# Patient Record
Sex: FEMALE | Race: BLACK OR AFRICAN AMERICAN | NOT HISPANIC OR LATINO | Employment: FULL TIME | URBAN - METROPOLITAN AREA
[De-identification: names, ages, dates, MRNs, and addresses within clinical notes are randomized per-mention and may not be internally consistent; named-entity substitution may affect disease eponyms.]

---

## 2017-06-02 ENCOUNTER — APPOINTMENT (OUTPATIENT)
Dept: RADIOLOGY | Facility: MEDICAL CENTER | Age: 47
DRG: 871 | End: 2017-06-02
Attending: EMERGENCY MEDICINE
Payer: COMMERCIAL

## 2017-06-02 ENCOUNTER — HOSPITAL ENCOUNTER (INPATIENT)
Facility: MEDICAL CENTER | Age: 47
LOS: 6 days | DRG: 871 | End: 2017-06-08
Attending: EMERGENCY MEDICINE | Admitting: INTERNAL MEDICINE
Payer: COMMERCIAL

## 2017-06-02 ENCOUNTER — RESOLUTE PROFESSIONAL BILLING HOSPITAL PROF FEE (OUTPATIENT)
Dept: HOSPITALIST | Facility: MEDICAL CENTER | Age: 47
End: 2017-06-02
Payer: COMMERCIAL

## 2017-06-02 DIAGNOSIS — J18.9 PNEUMONIA, COMMUNITY ACQUIRED: ICD-10-CM

## 2017-06-02 DIAGNOSIS — I50.31 ACUTE DIASTOLIC CHF (CONGESTIVE HEART FAILURE) (HCC): ICD-10-CM

## 2017-06-02 DIAGNOSIS — J96.01 ACUTE RESPIRATORY FAILURE WITH HYPOXIA (HCC): ICD-10-CM

## 2017-06-02 DIAGNOSIS — J81.0 ACUTE PULMONARY EDEMA (HCC): ICD-10-CM

## 2017-06-02 DIAGNOSIS — D86.9 SARCOIDOSIS: ICD-10-CM

## 2017-06-02 DIAGNOSIS — D50.0 IRON DEFICIENCY ANEMIA DUE TO CHRONIC BLOOD LOSS: ICD-10-CM

## 2017-06-02 DIAGNOSIS — J15.211: ICD-10-CM

## 2017-06-02 DIAGNOSIS — R09.02 HYPOXIA: ICD-10-CM

## 2017-06-02 PROBLEM — J96.00 ACUTE RESPIRATORY FAILURE (HCC): Status: ACTIVE | Noted: 2017-06-02

## 2017-06-02 LAB
ALBUMIN SERPL BCP-MCNC: 3.3 G/DL (ref 3.2–4.9)
ALBUMIN/GLOB SERPL: 0.9 G/DL
ALP SERPL-CCNC: 77 U/L (ref 30–99)
ALT SERPL-CCNC: 23 U/L (ref 2–50)
ANION GAP SERPL CALC-SCNC: 11 MMOL/L (ref 0–11.9)
ANISOCYTOSIS BLD QL SMEAR: ABNORMAL
APPEARANCE UR: CLEAR
AST SERPL-CCNC: 39 U/L (ref 12–45)
BASOPHILS # BLD AUTO: 0.3 % (ref 0–1.8)
BASOPHILS # BLD: 0.05 K/UL (ref 0–0.12)
BILIRUB SERPL-MCNC: 0.6 MG/DL (ref 0.1–1.5)
BILIRUB UR QL STRIP.AUTO: NEGATIVE
BNP SERPL-MCNC: 52 PG/ML (ref 0–100)
BUN SERPL-MCNC: <5 MG/DL (ref 8–22)
CALCIUM SERPL-MCNC: 8 MG/DL (ref 8.4–10.2)
CHLORIDE SERPL-SCNC: 100 MMOL/L (ref 96–112)
CO2 SERPL-SCNC: 23 MMOL/L (ref 20–33)
COLOR UR: YELLOW
COMMENT 1642: NORMAL
CREAT SERPL-MCNC: 0.89 MG/DL (ref 0.5–1.4)
DACRYOCYTES BLD QL SMEAR: NORMAL
EOSINOPHIL # BLD AUTO: 0.04 K/UL (ref 0–0.51)
EOSINOPHIL NFR BLD: 0.3 % (ref 0–6.9)
EPI CELLS #/AREA URNS HPF: NORMAL /HPF
ERYTHROCYTE [DISTWIDTH] IN BLOOD BY AUTOMATED COUNT: 51.8 FL (ref 35.9–50)
EST. AVERAGE GLUCOSE BLD GHB EST-MCNC: 128 MG/DL
FERRITIN SERPL-MCNC: 17 NG/ML (ref 10–291)
FLUAV H1 2009 PAND RNA SPEC QL NAA+PROBE: NOT DETECTED
FLUAV RNA SPEC QL NAA+PROBE: NEGATIVE
FLUAV+FLUBV AG SPEC QL IA: NORMAL
FLUBV RNA SPEC QL NAA+PROBE: NEGATIVE
FOLATE SERPL-MCNC: 5.2 NG/ML
GFR SERPL CREATININE-BSD FRML MDRD: >60 ML/MIN/1.73 M 2
GLOBULIN SER CALC-MCNC: 3.6 G/DL (ref 1.9–3.5)
GLUCOSE BLD-MCNC: 167 MG/DL (ref 65–99)
GLUCOSE BLD-MCNC: 219 MG/DL (ref 65–99)
GLUCOSE SERPL-MCNC: 141 MG/DL (ref 65–99)
GLUCOSE UR STRIP.AUTO-MCNC: NEGATIVE MG/DL
HBA1C MFR BLD: 6.1 % (ref 0–5.6)
HCT VFR BLD AUTO: 28.6 % (ref 37–47)
HEMOCCULT SP1 STL QL: NEGATIVE
HGB BLD-MCNC: 8.4 G/DL (ref 12–16)
HYPOCHROMIA BLD QL SMEAR: ABNORMAL
IMM GRANULOCYTES # BLD AUTO: 0.07 K/UL (ref 0–0.11)
IMM GRANULOCYTES NFR BLD AUTO: 0.4 % (ref 0–0.9)
IRON SATN MFR SERPL: 2 % (ref 15–55)
IRON SERPL-MCNC: 10 UG/DL (ref 40–170)
KETONES UR STRIP.AUTO-MCNC: NEGATIVE MG/DL
LACTATE BLD-SCNC: 1.79 MMOL/L (ref 0.5–2)
LACTATE BLD-SCNC: 2.47 MMOL/L (ref 0.5–2)
LACTATE BLD-SCNC: 3.05 MMOL/L (ref 0.5–2)
LACTATE BLD-SCNC: 3.28 MMOL/L (ref 0.5–2)
LACTATE BLD-SCNC: 3.68 MMOL/L (ref 0.5–2)
LEUKOCYTE ESTERASE UR QL STRIP.AUTO: NEGATIVE
LG PLATELETS BLD QL SMEAR: NORMAL
LYMPHOCYTES # BLD AUTO: 1.98 K/UL (ref 1–4.8)
LYMPHOCYTES NFR BLD: 12.7 % (ref 22–41)
MACROCYTES BLD QL SMEAR: ABNORMAL
MCH RBC QN AUTO: 20.5 PG (ref 27–33)
MCHC RBC AUTO-ENTMCNC: 29.4 G/DL (ref 33.6–35)
MCV RBC AUTO: 69.8 FL (ref 81.4–97.8)
MICRO URNS: ABNORMAL
MONOCYTES # BLD AUTO: 0.86 K/UL (ref 0–0.85)
MONOCYTES NFR BLD AUTO: 5.5 % (ref 0–13.4)
MUCOUS THREADS #/AREA URNS HPF: NORMAL /HPF
NEUTROPHILS # BLD AUTO: 12.58 K/UL (ref 2–7.15)
NEUTROPHILS NFR BLD: 80.8 % (ref 44–72)
NITRITE UR QL STRIP.AUTO: NEGATIVE
NRBC # BLD AUTO: 0.05 K/UL
NRBC BLD AUTO-RTO: 0.3 /100 WBC
PH UR STRIP.AUTO: 6 [PH]
PLATELET # BLD AUTO: 339 K/UL (ref 164–446)
PLATELET BLD QL SMEAR: NORMAL
PMV BLD AUTO: 10 FL (ref 9–12.9)
POIKILOCYTOSIS BLD QL SMEAR: NORMAL
POLYCHROMASIA BLD QL SMEAR: NORMAL
POTASSIUM SERPL-SCNC: 2.9 MMOL/L (ref 3.6–5.5)
PROT SERPL-MCNC: 6.9 G/DL (ref 6–8.2)
PROT UR QL STRIP: 30 MG/DL
RBC # BLD AUTO: 4.1 M/UL (ref 4.2–5.4)
RBC # URNS HPF: NORMAL /HPF
RBC BLD AUTO: PRESENT
RBC UR QL AUTO: NEGATIVE
SIGNIFICANT IND 70042: NORMAL
SITE SITE: NORMAL
SODIUM SERPL-SCNC: 134 MMOL/L (ref 135–145)
SOURCE SOURCE: NORMAL
SP GR UR STRIP.AUTO: 1.01
SPECIMEN DRAWN FROM PATIENT: ABNORMAL
SPECIMEN DRAWN FROM PATIENT: NORMAL
STOMATOCYTES BLD QL SMEAR: NORMAL
TIBC SERPL-MCNC: 508 UG/DL (ref 250–450)
TSH SERPL DL<=0.005 MIU/L-ACNC: 0.56 UIU/ML (ref 0.35–5.5)
UNIDENT CRYS URNS QL MICRO: NORMAL /HPF
VIT B12 SERPL-MCNC: 257 PG/ML (ref 211–911)
WBC # BLD AUTO: 15.6 K/UL (ref 4.8–10.8)
WBC #/AREA URNS HPF: NORMAL /HPF

## 2017-06-02 PROCEDURE — 82746 ASSAY OF FOLIC ACID SERUM: CPT

## 2017-06-02 PROCEDURE — 700102 HCHG RX REV CODE 250 W/ 637 OVERRIDE(OP): Performed by: INTERNAL MEDICINE

## 2017-06-02 PROCEDURE — 87502 INFLUENZA DNA AMP PROBE: CPT

## 2017-06-02 PROCEDURE — 36415 COLL VENOUS BLD VENIPUNCTURE: CPT

## 2017-06-02 PROCEDURE — 83036 HEMOGLOBIN GLYCOSYLATED A1C: CPT

## 2017-06-02 PROCEDURE — 99223 1ST HOSP IP/OBS HIGH 75: CPT | Performed by: INTERNAL MEDICINE

## 2017-06-02 PROCEDURE — A9270 NON-COVERED ITEM OR SERVICE: HCPCS | Performed by: INTERNAL MEDICINE

## 2017-06-02 PROCEDURE — 83605 ASSAY OF LACTIC ACID: CPT | Mod: 91

## 2017-06-02 PROCEDURE — 94760 N-INVAS EAR/PLS OXIMETRY 1: CPT

## 2017-06-02 PROCEDURE — 87040 BLOOD CULTURE FOR BACTERIA: CPT

## 2017-06-02 PROCEDURE — 700101 HCHG RX REV CODE 250: Performed by: INTERNAL MEDICINE

## 2017-06-02 PROCEDURE — 82728 ASSAY OF FERRITIN: CPT

## 2017-06-02 PROCEDURE — 87086 URINE CULTURE/COLONY COUNT: CPT

## 2017-06-02 PROCEDURE — 83550 IRON BINDING TEST: CPT

## 2017-06-02 PROCEDURE — 71010 DX-CHEST-PORTABLE (1 VIEW): CPT

## 2017-06-02 PROCEDURE — 83880 ASSAY OF NATRIURETIC PEPTIDE: CPT

## 2017-06-02 PROCEDURE — 700101 HCHG RX REV CODE 250: Performed by: EMERGENCY MEDICINE

## 2017-06-02 PROCEDURE — A9270 NON-COVERED ITEM OR SERVICE: HCPCS | Performed by: EMERGENCY MEDICINE

## 2017-06-02 PROCEDURE — 99285 EMERGENCY DEPT VISIT HI MDM: CPT

## 2017-06-02 PROCEDURE — 80053 COMPREHEN METABOLIC PANEL: CPT

## 2017-06-02 PROCEDURE — 96361 HYDRATE IV INFUSION ADD-ON: CPT

## 2017-06-02 PROCEDURE — 87070 CULTURE OTHR SPECIMN AEROBIC: CPT

## 2017-06-02 PROCEDURE — 87400 INFLUENZA A/B EACH AG IA: CPT

## 2017-06-02 PROCEDURE — 83540 ASSAY OF IRON: CPT

## 2017-06-02 PROCEDURE — 700111 HCHG RX REV CODE 636 W/ 250 OVERRIDE (IP): Performed by: EMERGENCY MEDICINE

## 2017-06-02 PROCEDURE — 82607 VITAMIN B-12: CPT

## 2017-06-02 PROCEDURE — 84443 ASSAY THYROID STIM HORMONE: CPT

## 2017-06-02 PROCEDURE — 82270 OCCULT BLOOD FECES: CPT

## 2017-06-02 PROCEDURE — 700102 HCHG RX REV CODE 250 W/ 637 OVERRIDE(OP): Performed by: EMERGENCY MEDICINE

## 2017-06-02 PROCEDURE — 96375 TX/PRO/DX INJ NEW DRUG ADDON: CPT

## 2017-06-02 PROCEDURE — 93005 ELECTROCARDIOGRAM TRACING: CPT | Performed by: EMERGENCY MEDICINE

## 2017-06-02 PROCEDURE — 304562 HCHG STAT O2 MASK/CANNULA

## 2017-06-02 PROCEDURE — 770020 HCHG ROOM/CARE - TELE (206)

## 2017-06-02 PROCEDURE — 85025 COMPLETE CBC W/AUTO DIFF WBC: CPT

## 2017-06-02 PROCEDURE — 96365 THER/PROPH/DIAG IV INF INIT: CPT

## 2017-06-02 PROCEDURE — 87503 INFLUENZA DNA AMP PROB ADDL: CPT

## 2017-06-02 PROCEDURE — 94640 AIRWAY INHALATION TREATMENT: CPT

## 2017-06-02 PROCEDURE — 81001 URINALYSIS AUTO W/SCOPE: CPT

## 2017-06-02 PROCEDURE — 700105 HCHG RX REV CODE 258: Performed by: NURSE PRACTITIONER

## 2017-06-02 PROCEDURE — 700105 HCHG RX REV CODE 258: Performed by: EMERGENCY MEDICINE

## 2017-06-02 PROCEDURE — 700111 HCHG RX REV CODE 636 W/ 250 OVERRIDE (IP): Performed by: INTERNAL MEDICINE

## 2017-06-02 PROCEDURE — 82962 GLUCOSE BLOOD TEST: CPT

## 2017-06-02 PROCEDURE — 96367 TX/PROPH/DG ADDL SEQ IV INF: CPT

## 2017-06-02 RX ORDER — L. ACIDOPHILUS/L.BULGARICUS 100MM CELL
1 GRANULES IN PACKET (EA) ORAL
Status: DISCONTINUED | OUTPATIENT
Start: 2017-06-02 | End: 2017-06-08 | Stop reason: HOSPADM

## 2017-06-02 RX ORDER — ONDANSETRON 4 MG/1
4 TABLET, ORALLY DISINTEGRATING ORAL EVERY 8 HOURS PRN
Qty: 10 TAB | Refills: 0 | Status: SHIPPED | OUTPATIENT
Start: 2017-06-02 | End: 2018-01-25

## 2017-06-02 RX ORDER — PROMETHAZINE HYDROCHLORIDE 25 MG/1
12.5-25 TABLET ORAL EVERY 4 HOURS PRN
Status: DISCONTINUED | OUTPATIENT
Start: 2017-06-02 | End: 2017-06-08 | Stop reason: HOSPADM

## 2017-06-02 RX ORDER — SODIUM CHLORIDE 9 MG/ML
30 INJECTION, SOLUTION INTRAVENOUS
Status: COMPLETED | OUTPATIENT
Start: 2017-06-02 | End: 2017-06-02

## 2017-06-02 RX ORDER — DEXTROSE MONOHYDRATE 25 G/50ML
25 INJECTION, SOLUTION INTRAVENOUS
Status: DISCONTINUED | OUTPATIENT
Start: 2017-06-02 | End: 2017-06-08 | Stop reason: HOSPADM

## 2017-06-02 RX ORDER — AZITHROMYCIN 250 MG/1
TABLET, FILM COATED ORAL
Qty: 6 TAB | Refills: 0 | Status: SHIPPED | OUTPATIENT
Start: 2017-06-02 | End: 2017-06-08

## 2017-06-02 RX ORDER — POTASSIUM CHLORIDE 20 MEQ/1
40 TABLET, EXTENDED RELEASE ORAL 2 TIMES DAILY
Status: DISCONTINUED | OUTPATIENT
Start: 2017-06-02 | End: 2017-06-03

## 2017-06-02 RX ORDER — POLYETHYLENE GLYCOL 3350 17 G/17G
1 POWDER, FOR SOLUTION ORAL
Status: DISCONTINUED | OUTPATIENT
Start: 2017-06-02 | End: 2017-06-08 | Stop reason: HOSPADM

## 2017-06-02 RX ORDER — ONDANSETRON 4 MG/1
4 TABLET, ORALLY DISINTEGRATING ORAL EVERY 4 HOURS PRN
Status: DISCONTINUED | OUTPATIENT
Start: 2017-06-02 | End: 2017-06-08 | Stop reason: HOSPADM

## 2017-06-02 RX ORDER — SODIUM CHLORIDE 9 MG/ML
1000 INJECTION, SOLUTION INTRAVENOUS ONCE
Status: COMPLETED | OUTPATIENT
Start: 2017-06-02 | End: 2017-06-02

## 2017-06-02 RX ORDER — BISACODYL 10 MG
10 SUPPOSITORY, RECTAL RECTAL
Status: DISCONTINUED | OUTPATIENT
Start: 2017-06-02 | End: 2017-06-08 | Stop reason: HOSPADM

## 2017-06-02 RX ORDER — POTASSIUM CHLORIDE 750 MG/1
40 TABLET, FILM COATED, EXTENDED RELEASE ORAL ONCE
Status: COMPLETED | OUTPATIENT
Start: 2017-06-02 | End: 2017-06-02

## 2017-06-02 RX ORDER — OXYCODONE HYDROCHLORIDE 5 MG/1
5 TABLET ORAL EVERY 4 HOURS PRN
Status: DISCONTINUED | OUTPATIENT
Start: 2017-06-02 | End: 2017-06-08 | Stop reason: HOSPADM

## 2017-06-02 RX ORDER — ONDANSETRON 2 MG/ML
4 INJECTION INTRAMUSCULAR; INTRAVENOUS EVERY 4 HOURS PRN
Status: DISCONTINUED | OUTPATIENT
Start: 2017-06-02 | End: 2017-06-08 | Stop reason: HOSPADM

## 2017-06-02 RX ORDER — PREDNISONE 50 MG/1
50 TABLET ORAL DAILY
Status: DISCONTINUED | OUTPATIENT
Start: 2017-06-02 | End: 2017-06-03

## 2017-06-02 RX ORDER — SODIUM CHLORIDE AND POTASSIUM CHLORIDE 300; 900 MG/100ML; MG/100ML
INJECTION, SOLUTION INTRAVENOUS CONTINUOUS
Status: DISCONTINUED | OUTPATIENT
Start: 2017-06-02 | End: 2017-06-03

## 2017-06-02 RX ORDER — PROMETHAZINE HYDROCHLORIDE 25 MG/1
12.5-25 SUPPOSITORY RECTAL EVERY 4 HOURS PRN
Status: DISCONTINUED | OUTPATIENT
Start: 2017-06-02 | End: 2017-06-08 | Stop reason: HOSPADM

## 2017-06-02 RX ORDER — ACETAMINOPHEN 325 MG/1
1000 TABLET ORAL ONCE
Status: COMPLETED | OUTPATIENT
Start: 2017-06-02 | End: 2017-06-02

## 2017-06-02 RX ORDER — AMOXICILLIN 250 MG
2 CAPSULE ORAL 2 TIMES DAILY
Status: DISCONTINUED | OUTPATIENT
Start: 2017-06-02 | End: 2017-06-08 | Stop reason: HOSPADM

## 2017-06-02 RX ORDER — ONDANSETRON 2 MG/ML
4 INJECTION INTRAMUSCULAR; INTRAVENOUS ONCE
Status: COMPLETED | OUTPATIENT
Start: 2017-06-02 | End: 2017-06-02

## 2017-06-02 RX ORDER — CEFTRIAXONE 2 G/1
2 INJECTION, POWDER, FOR SOLUTION INTRAMUSCULAR; INTRAVENOUS ONCE
Status: COMPLETED | OUTPATIENT
Start: 2017-06-02 | End: 2017-06-02

## 2017-06-02 RX ORDER — CEFUROXIME AXETIL 500 MG/1
500 TABLET ORAL 2 TIMES DAILY
Qty: 20 TAB | Refills: 0 | Status: SHIPPED | OUTPATIENT
Start: 2017-06-02 | End: 2017-06-08

## 2017-06-02 RX ORDER — HYDROCODONE BITARTRATE AND ACETAMINOPHEN 5; 325 MG/1; MG/1
1-2 TABLET ORAL EVERY 4 HOURS PRN
Qty: 20 TAB | Refills: 0 | Status: SHIPPED | OUTPATIENT
Start: 2017-06-02 | End: 2018-01-25

## 2017-06-02 RX ADMIN — HYDROMORPHONE HYDROCHLORIDE 1 MG: 1 INJECTION, SOLUTION INTRAMUSCULAR; INTRAVENOUS; SUBCUTANEOUS at 08:38

## 2017-06-02 RX ADMIN — ONDANSETRON 4 MG: 2 INJECTION INTRAMUSCULAR; INTRAVENOUS at 08:38

## 2017-06-02 RX ADMIN — Medication 400 MG: at 20:15

## 2017-06-02 RX ADMIN — ALBUTEROL SULFATE 2.5 MG: 2.5 SOLUTION RESPIRATORY (INHALATION) at 14:44

## 2017-06-02 RX ADMIN — INSULIN LISPRO 3 UNITS: 100 INJECTION, SOLUTION INTRAVENOUS; SUBCUTANEOUS at 20:24

## 2017-06-02 RX ADMIN — ALBUTEROL SULFATE 2.5 MG: 2.5 SOLUTION RESPIRATORY (INHALATION) at 08:27

## 2017-06-02 RX ADMIN — POTASSIUM CHLORIDE 40 MEQ: 1500 TABLET, EXTENDED RELEASE ORAL at 13:34

## 2017-06-02 RX ADMIN — SODIUM CHLORIDE 3261 ML: 9 INJECTION, SOLUTION INTRAVENOUS at 08:37

## 2017-06-02 RX ADMIN — AZITHROMYCIN MONOHYDRATE 500 MG: 500 INJECTION, POWDER, LYOPHILIZED, FOR SOLUTION INTRAVENOUS at 11:33

## 2017-06-02 RX ADMIN — INSULIN LISPRO 2 UNITS: 100 INJECTION, SOLUTION INTRAVENOUS; SUBCUTANEOUS at 16:59

## 2017-06-02 RX ADMIN — DOCUSATE SODIUM AND SENNOSIDES 2 TABLET: 8.6; 5 TABLET, FILM COATED ORAL at 13:43

## 2017-06-02 RX ADMIN — PREDNISONE 50 MG: 50 TABLET ORAL at 13:34

## 2017-06-02 RX ADMIN — ALBUTEROL SULFATE 2.5 MG: 2.5 SOLUTION RESPIRATORY (INHALATION) at 18:26

## 2017-06-02 RX ADMIN — LACTOBACILLUS ACIDOPHILUS / LACTOBACILLUS BULGARICUS 1 PACKET: 100 MILLION CFU STRENGTH GRANULES at 16:56

## 2017-06-02 RX ADMIN — OXYCODONE HYDROCHLORIDE 5 MG: 5 TABLET ORAL at 20:15

## 2017-06-02 RX ADMIN — POTASSIUM CHLORIDE 40 MEQ: 1500 TABLET, EXTENDED RELEASE ORAL at 20:17

## 2017-06-02 RX ADMIN — ACETAMINOPHEN 975 MG: 325 TABLET, FILM COATED ORAL at 08:55

## 2017-06-02 RX ADMIN — ONDANSETRON 4 MG: 2 INJECTION INTRAMUSCULAR; INTRAVENOUS at 13:35

## 2017-06-02 RX ADMIN — SODIUM CHLORIDE AND POTASSIUM CHLORIDE: 9; 2.98 INJECTION, SOLUTION INTRAVENOUS at 13:33

## 2017-06-02 RX ADMIN — ENOXAPARIN SODIUM 40 MG: 100 INJECTION SUBCUTANEOUS at 13:30

## 2017-06-02 RX ADMIN — POTASSIUM CHLORIDE 40 MEQ: 750 TABLET, FILM COATED, EXTENDED RELEASE ORAL at 10:31

## 2017-06-02 RX ADMIN — DOCUSATE SODIUM AND SENNOSIDES 2 TABLET: 8.6; 5 TABLET, FILM COATED ORAL at 20:14

## 2017-06-02 RX ADMIN — SODIUM CHLORIDE 1000 ML: 9 INJECTION, SOLUTION INTRAVENOUS at 23:04

## 2017-06-02 RX ADMIN — Medication 400 MG: at 13:34

## 2017-06-02 RX ADMIN — OXYCODONE HYDROCHLORIDE 5 MG: 5 TABLET ORAL at 16:07

## 2017-06-02 RX ADMIN — CEFTRIAXONE 2 G: 2 INJECTION, POWDER, FOR SOLUTION INTRAMUSCULAR; INTRAVENOUS at 10:50

## 2017-06-02 ASSESSMENT — LIFESTYLE VARIABLES
ALCOHOL_USE: NO
EVER_SMOKED: YES
PACK_YEARS: 2
EVER_SMOKED: YES

## 2017-06-02 ASSESSMENT — PAIN SCALES - GENERAL
PAINLEVEL_OUTOF10: 0
PAINLEVEL_OUTOF10: 6
PAINLEVEL_OUTOF10: 8
PAINLEVEL_OUTOF10: 6

## 2017-06-02 NOTE — PROGRESS NOTES
Pt resting, eyes closed, appears comfortable. Respirations even although still somewhat shallow/tachypneic.

## 2017-06-02 NOTE — IP AVS SNAPSHOT
" Home Care Instructions                                                                                                                  Name:Joann Deshpande  Medical Record Number:[id number]  CSN: [id number]    YOB: 1970   Age: 46 y.o.  Sex: female  HT:1.702 m (5' 7\") WT: 108.7 kg (239 lb 10.2 oz)          Admit Date: 6/2/2017     Discharge Date:   Today's Date: 6/8/2017  Attending Doctor:  Kim Zuñiga D.O.                  Allergies:  Review of patient's allergies indicates no known allergies.            Discharge Instructions       Pneumonia, Adult  Drink plenty of fluids. Take ibuprofen for pain and fever, hydrocodone for fever and cough and antibiotics as prescribed. Use Zofran if needed for nausea and vomiting. Return for ill appearance, worsening shortness of breath, uncontrolled vomiting, severe dizziness or failure to improve in 36-48 hours.     Pneumonia is an infection of the lungs.   CAUSES  Pneumonia may be caused by bacteria or a virus. Usually, the infection is caused by breathing in droplets from an infected person's cough or sneeze.   SYMPTOMS   Symptoms of pneumonia include:  · Cough.  · Fever.  · Chest pain.  · Rapid breathing.  · Shortness of breath.  · Shaking chills.  · Mucus production.  DIAGNOSIS   If you have the common symptoms of pneumonia, often your health care provider will confirm the diagnosis with a chest X-ray. The X-ray will show an abnormality in the lung if you have pneumonia. Other tests may be done on your blood, urine, or mucus (sputum) to find the specific cause of your pneumonia. A blood gas test or pulse oximetry test may be needed to check how well your lungs are working.  TREATMENT   Your treatment will depend on whether your pneumonia is caused by bacteria or a virus.   1. Bacterial pneumonia is treated with antibiotic medicine.  2. Pneumonia that is caused by the influenza virus may be treated with an antiviral medicine.  3. Pneumonia that is caused by a " virus other than influenza will not respond to antibiotic medicine. This type of pneumonia will have to run its course.   HOME CARE INSTRUCTIONS   1. Cough suppressants may be used if you are losing too much rest from coughing at night. However, you should try to avoid taking cough suppresants. This is because coughing helps to remove mucus from your lungs.  2. Sleep in a semi-upright position at night. Try sleeping in a reclining chair, or place a few pillows under your head.  3. Try using a cold steam vaporizer or humidifier in your home or bedroom. This may help loosen your mucus.  4. If you were prescribed an antibiotic medicine, finish all of it even if you start to feel better.  5. If you were prescribed an expectorant, take it as directed by your health care provider. This medicine loosens the mucus so you can cough it up.  6. Take medicines only as directed by your health care provider.  7. Do not smoke. If you are a smoker and continue to smoke, your cough may last several weeks after your pneumonia has cleared.  8. Get rest when you feel tired, or as needed.  PREVENTION  A pneumococcal shot (vaccine) is available to prevent a common bacterial cause of pneumonia. This is usually suggested for:  1. People over 65 years old.  2. People on chemotherapy.  3. People with chronic lung problems, such as bronchitis or emphysema.  4. People with immune system problems.  If you are over 65 years old or have a high risk condition, you may receive the pneumococcal vaccine if you have not received it before. In some countries, a routine influenza vaccine is also recommended. This vaccine can help prevent some cases of pneumonia. You may be offered the influenza vaccine as part of your care.  If you are a smoker, it is time to quit in order to prevent pneumonia in the future. You may receive instructions on how to stop smoking. Your health care provider can provide medicines and counseling to help you quit.  SEEK MEDICAL  CARE IF:  1. You have a fever.  2. You cannot control your cough with suppressants at night, and you keep losing sleep.  SEEK IMMEDIATE MEDICAL CARE IF:   · You have worsening shortness of breath.  · You have increased chest pain.  · Your sickness becomes worse, especially if you are an older adult or have a weakened immune system.  · You cough up blood.  · You have pain that is getting worse or is not controlled with medicines.  · Your symptoms are getting worse rather than better.     This information is not intended to replace advice given to you by your health care provider. Make sure you discuss any questions you have with your health care provider.     Document Released: 12/18/2006 Document Revised: 01/08/2016 Document Reviewed: 04/13/2016  [employer] Interactive Patient Education ©2016 [employer]    Discharge Instructions    Discharged to home by car with relative. Discharged via wheelchair, hospital escort: Yes.  Special equipment needed: Not Applicable    Be sure to schedule a follow-up appointment with your primary care doctor or any specialists as instructed.     Discharge Plan:   Influenza Vaccine Indication: Indicated: Not available from distributor/ (past flu season)    I understand that a diet low in cholesterol, fat, and sodium is recommended for good health. Unless I have been given specific instructions below for another diet, I accept this instruction as my diet prescription.   Other diet: n/a    Special Instructions: None    · Is patient discharged on Warfarin / Coumadin?   No     · Is patient Post Blood Transfusion?  No    Depression / Suicide Risk    As you are discharged from this [facility] facility, it is important to learn how to keep safe from harming yourself.    Recognize the warning signs:  · Abrupt changes in personality, positive or negative- including increase in energy   · Giving away possessions  · Change in eating patterns- significant weight changes-  positive or  negative  · Change in sleeping patterns- unable to sleep or sleeping all the time   · Unwillingness or inability to communicate  · Depression  · Unusual sadness, discouragement and loneliness  · Talk of wanting to die  · Neglect of personal appearance   · Rebelliousness- reckless behavior  · Withdrawal from people/activities they love  · Confusion- inability to concentrate     If you or a loved one observes any of these behaviors or has concerns about self-harm, here's what you can do:  · Talk about it- your feelings and reasons for harming yourself  · Remove any means that you might use to hurt yourself (examples: pills, rope, extension cords, firearm)  · Get professional help from the community (Mental Health, Substance Abuse, psychological counseling)  · Do not be alone:Call your Safe Contact- someone whom you trust who will be there for you.  · Call your local CRISIS HOTLINE 874-8812 or 047-527-8175  · Call your local Children's Mobile Crisis Response Team [place] (684) 314-8158 or www.Relayr  · Call the toll free National Suicide Prevention Hotlines   · National Suicide Prevention Lifeline 584-782-XHRB (4451)  · National Hope Line Network 800-SUICIDE (253-2280)        Follow-up Information     1. Follow up with Edis Rivas M.D.. Schedule an appointment as soon as possible for a visit in 3 days.    Specialty:  Cardiology    Why:  As needed    Contact information    [address]  [address]  [facility] [address] [phone]           Discharge Medication Instructions:    Below are the medications your physician expects you to take upon discharge:    Review all your home medications and newly ordered medications with your doctor and/or pharmacist. Follow medication instructions as directed by your doctor and/or pharmacist.    Please keep your medication list with you and share with your physician.               Medication List      START taking these medications        Instructions     Morning Afternoon Evening Bedtime    albuterol 108 (90 BASE) MCG/ACT Aers inhalation aerosol        Inhale 2 Puffs by mouth every 6 hours as needed for Shortness of Breath.   Dose:  2 Puff                        ferrous sulfate 325 (65 FE) MG tablet   Last time this was given:  325 mg on 6/8/2017  4:56 PM        Take 1 Tab by mouth every day.   Dose:  325 mg                        fluticasone-salmeterol 250-50 MCG/DOSE Aepb   Commonly known as:  ADVAIR        Inhale 1 Puff by mouth every 12 hours.   Dose:  1 Puff                        furosemide 40 MG Tabs   Last time this was given:  40 mg on 6/8/2017  8:49 AM   Commonly known as:  LASIX        Take 1 Tab by mouth every day.   Dose:  40 mg                        hydrocodone-acetaminophen 5-325 MG Tabs per tablet   Commonly known as:  NORCO        Take 1-2 Tabs by mouth every four hours as needed (mild pain).   Dose:  1-2 Tab                        levofloxacin 500 MG tablet   Commonly known as:  LEVAQUIN        Take 1 Tab by mouth every day for 7 days.   Dose:  500 mg                        ondansetron 4 MG Tbdp   Commonly known as:  ZOFRAN ODT        Take 1 Tab by mouth every 8 hours as needed for Nausea/Vomiting.   Dose:  4 mg                        SUMAtriptan 25 MG Tabs tablet   Last time this was given:  25 mg on 6/8/2017  4:56 PM   Commonly known as:  IMITREX        Take 1-4 Tabs by mouth Once PRN for Migraine for up to 1 dose.   Dose:   mg                             Where to Get Your Medications      These medications were sent to [id number]  [place] - [address]  [address] [place] [place] [address]     Phone:  [phone]    - albuterol 108 (90 BASE) MCG/ACT Aers inhalation aerosol  - ferrous sulfate 325 (65 FE) MG tablet  - fluticasone-salmeterol 250-50 MCG/DOSE Aepb  - furosemide 40 MG Tabs  - levofloxacin 500 MG tablet  - SUMAtriptan 25 MG Tabs tablet      You can get these medications from any pharmacy     Bring a  paper prescription for each of these medications    - hydrocodone-acetaminophen 5-325 MG Tabs per tablet  - ondansetron 4 MG Tbdp            Orders for after discharge     .    Complete by:  As directed        DME O2 New Set Up    Complete by:  As directed        DME Pulse Oximeter    Complete by:  As directed    [id number]       DME Pulse Oximeter    Complete by:  As directed    [id number]             Instructions           Diet / Nutrition:    Follow any diet instructions given to you by your doctor or the dietician, including how much salt (sodium) you are allowed each day.    If you are overweight, talk to your doctor about a weight reduction plan.    Activity:    Remain physically active following your doctor's instructions about exercise and activity.    Rest often.     Any time you become even a little tired or short of breath, SIT DOWN and rest.    Worsening Symptoms:    Report any of the following signs and symptoms to the doctor's office immediately:    *Pain of jaw, arm, or neck  *Chest pain not relieved by medication                               *Dizziness or loss of consciousness  *Difficulty breathing even when at rest   *More tired than usual                                       *Bleeding drainage or swelling of surgical site  *Swelling of feet, ankles, legs or stomach                 *Fever (>100ºF)  *Pink or blood tinged sputum  *Weight gain (3lbs/day or 5lbs /week)           *Shock from internal defibrillator (if applicable)  *Palpitations or irregular heartbeats                *Cool and/or numb extremities    Stroke Awareness    Common Risk Factors for Stroke include:    Age  Atrial Fibrillation  Carotid Artery Stenosis  Diabetes Mellitus  Excessive alcohol consumption  High blood pressure  Overweight   Physical inactivity  Smoking    Warning signs and symptoms of a stroke include:    *Sudden numbness or weakness of the face, arm or leg (especially on one side of the body).  *Sudden confusion,  trouble speaking or understanding.  *Sudden trouble seeing in one or both eyes.  *Sudden trouble walking, dizziness, loss of balance or coordination.Sudden severe headache with no known cause.    It is very important to get treatment quickly when a stroke occurs. If you experience any of the above warning signs, call 911 immediately.                   Disclaimer         Quit Smoking / Tobacco Use:    I understand the use of any tobacco products increases my chance of suffering from future heart disease or stroke and could cause other illnesses which may shorten my life. Quitting the use of tobacco products is the single most important thing I can do to improve my health. For further information on smoking / tobacco cessation call a Toll Free Quit Line at 1-779.782.6601 (*National Cancer [place]) or 1-222.758.3519 (American Lung Association) or you can access the web based program at www.lungAdonit.org.    [place] Tobacco Users Help Line:  (861) 656-5276       Toll Free: 1-743.212.9236  Quit Tobacco Program [facility] (695)502-2589    Crisis Hotline:    [place] Crisis Hotline:  8-822-EZVYFSK or 1-947.812.5826    [place] Crisis Hotline:    1-724.311.3521 or 918-011-7021    Discharge Survey:   Thank you for choosing [facility]. We hope we did everything we could to make your hospital stay a pleasant one. You may be receiving a phone survey and we would appreciate your time and participation in answering the questions. Your input is very valuable to us in our efforts to improve our service to our patients and their families.        My signature on this form indicates that:    1. I have reviewed and understand the above information.  2. My questions regarding this information have been answered to my satisfaction.  3. I have formulated a plan with my discharge nurse to obtain my prescribed medications for home.                  Disclaimer         __________________________________                      __________       ________                       Patient Signature                                                 Date                    Time

## 2017-06-02 NOTE — CARE PLAN
Problem: Oxygenation:  Goal: Maintain adequate oxygenation dependent on patient condition  Intervention: Manage oxygen therapy by monitoring pulse oximetry and/or ABG values  Will continue to wean. Now on 3lpm oxygenating well.      Problem: Bronchoconstriction:  Goal: Improve in air movement and diminished wheezing  Outcome: PROGRESSING AS EXPECTED

## 2017-06-02 NOTE — PROGRESS NOTES
Received report from ED. Pt arrived to unit via [facility], [facility] care. This pt is AOx4, ambulatory with SBA, c/o nausea, medicated per MAR, voiding, declines pain. Patient and RN discussed plan of care including IV fluids and abx: questions answered. Labs noted, assessment complete, patient tolerating cardiac diet. Call light in place, fall precautions in place, patient educated on importance of calling for assistance. No additional needs at this time. VSS

## 2017-06-02 NOTE — PROGRESS NOTES
Tele Note    Rhythm Sinus rhythm, sinus tach  Rate 100's   TN 0.20  QRS 0.08  QT 0.34  Ectopy rare PVC

## 2017-06-02 NOTE — FLOWSHEET NOTE
06/02/17 0827   Events/Summary/Plan   Events/Summary/Plan Tx given in ER   Interdisciplinary Plan of Care-Goals (Indications)   Obstructive Ventilatory Defect or Pulmonary Disease without Obvious Obstruction Improved Peak Flow Pre / Post Bronchodilator with 15% Improvement   Interdisciplinary Plan of Care-Outcomes    Bronchodilator Outcome Improved Vital Signs and Measures of Gas Exchange   Education   Education Yes - Pt. / Family has been Instructed in use of Respiratory Medications and Adverse Reactions   RT Assessment of Delivered Medications   Evaluation of Medication Delivery Daily Yes-- Pt /Family has been Instructed in use of Respiratory Medications and Adverse Reactions   SVN Group   #SVN Performed Yes   Given By: Mouthpiece   Date SVN Last Changed 06/02/17   Date SVN Next Change Due (Q 7 Days) 06/09/17   Respiratory WDL   Respiratory (WDL) X   Chest Exam   Work Of Breathing / Effort Mild   Respiration 20   Pulse (!) 110   Breath Sounds   Pre/Post Intervention Post Intervention Assessment   RUL Breath Sounds Clear   RML Breath Sounds Clear;Diminished   RLL Breath Sounds Diminished   TENZIN Breath Sounds Clear   LLL Breath Sounds Diminished   Secretions   Cough Non Productive   How Sputum Obtained Spontaneous   Oximetry   Continuous Oximetry Yes   Oxygen   Home O2 Use Prior To Admission? No   Pulse Oximetry 92 %   O2 (LPM) 0   O2 (FiO2) 21   O2 Daily Delivery Respiratory  Room Air with O2 Available

## 2017-06-02 NOTE — IP AVS SNAPSHOT
6/8/2017    Joann Deshpande  [address]  [place] [address]    Dear Joann:    [facility] wants to ensure your discharge home is safe and you or your loved ones have had all of your questions answered regarding your care after you leave the hospital.    Below is a list of resources and contact information should you have any questions regarding your hospital stay, follow-up instructions, or active medical symptoms.    Questions or Concerns Regarding… Contact   Medical Questions Related to Your Discharge  (7 days a week, 8am-5pm) Contact a Nurse Care Coordinator   [phone]   Medical Questions Not Related to Your Discharge  (24 hours a day / 7 days a week)  Contact the Nurse Health Line   [phone]    Medications or Discharge Instructions Refer to your discharge packet   or contact your [facility] Primary Care Provider   [phone]   Follow-up Appointment(s) Schedule your appointment via [employer]   or contact Scheduling [phone]   Billing Review your statement via [employer]  or contact Billing [phone]   Medical Records Review your records via [employer]   or contact Medical Records [phone]     You may receive a telephone call within two days of discharge. This call is to make certain you understand your discharge instructions and have the opportunity to have any questions answered. You can also easily access your medical information, test results and upcoming appointments via the [employer] free online health management tool. You can learn more and sign up at [employer]. For assistance setting up your [employer] account, please call [phone].    Once again, we want to ensure your discharge home is safe and that you have a clear understanding of any next steps in your care. If you have any questions or concerns, please do not hesitate to contact us, we are here for you. Thank you for choosing [facility] for your healthcare needs.    Sincerely,    Your [facility] Healthcare Team

## 2017-06-02 NOTE — ED NOTES
0826:  PIV started in [facility], blood and BC x 1 drawn and sent to lab  0837:  IVF infusing as ordered  0838:  Medicated for pain and nausea as ordered  0842:  Second PIV started in [facility] by ER Tech.  Blood and BC x 2 drawn and sent to lab  Flu culture collected and sent to lab  0855:  Medicated w/ Tylenol for fever as ordered  0938:  Pt resting quietly at this time.  IVFs infusing

## 2017-06-02 NOTE — ED PROVIDER NOTES
"ED Provider Note    CHIEF COMPLAINT  Chief Complaint   Patient presents with   • Fever     x 1 week   • Cough     x 1 week   • Body Aches   • Chest Wall Pain       HPI  Joann Deshpande is a 46 y.o. female who presents for 8 days of fever with body aches, joint pain, headache, rhinorrhea, sore throat, cough both dry and productive green sputum. She's had nausea but no vomiting and 3 episodes of diarrhea today. She does report shortness of breath and pleuritic chest pain. No ill contacts. History of sarcoid on prednisone 5 mg a day. No diabetes. No influenza vaccine.    REVIEW OF SYSTEMS  Pertinent positives include: Fever, body aches, cough, shortness of breath, chest pain.  Pertinent negatives include: Asthma, COPD, vomiting, constipation, abdominal pain, rash.  10+ systems reviewed and negative.      PAST MEDICAL HISTORY  Past Medical History   Diagnosis Date   • Sarcoidosis (CMS-HCC)    • Anemia        SOCIAL HISTORY  Social History   Substance Use Topics   • Smoking status: Former Smoker   • Smokeless tobacco: None   • Alcohol Use: Yes     History   Drug Use No       SURGICAL HISTORY  History reviewed. No pertinent past surgical history.    CURRENT MEDICATIONS  Home Medications     **Home medications have not yet been reviewed for this encounter**          ALLERGIES  No Known Allergies    PHYSICAL EXAM  VITAL SIGNS: /77 mmHg  Pulse 110  Temp(Src) 39.3 °C (102.8 °F)  Resp 20  Ht 1.702 m (5' 7\")  Wt 108.7 kg (239 lb 10.2 oz)  BMI 37.52 kg/m2  SpO2 92%  Reviewed and tachycardic, elevated blood pressure, febrile, tachypneic, no hypoxia on room air  Constitutional: Well developed, Well nourished, well appearing, mildly overweight, speaking full sentences.  HENT: Normocephalic, atraumatic, bilateral external ears normal, oropharynx moist, No exudates or erythema.   Eyes: PERRLA, conjunctiva pink, no scleral icterus.   Cardiovascular: Regular, tachycardic without murmur, rub, gallop.  Respiratory: Mild " rhonchi, no definite wheezes or rales. No cough.  Gastrointestinal: Soft, moderately overweight, nontender, nondistended.  Skin: No erythema, no rash.   Genitourinary:  No costovertebral angle tenderness.   Neurologic: Alert & oriented x 3, cranial nerves 2-12 intact by passive exam.  No focal deficit noted.  Psychiatric: Affect normal, Judgment normal, Mood normal.     DIFFERENTIAL DIAGNOSIS:  Pneumonia, influenza, sepsis, pyelonephritis.    EKG  EKG Interpretation    Interpreted by me    Rhythm: normal sinus   Rate: Tachycardic at 117  Axis: normal  Ectopy: none  Conduction: normal  ST Segments: Nonspecific anterior lateral ST depression  T Waves: no acute change  Q Waves: none  No comparison  Clinical Impression: Sinus tachycardia with nonspecific anterior lateral ST change.    RADIOLOGY/PROCEDURES  DX-CHEST-PORTABLE (1 VIEW)   Final Result      1.  Cardiomegaly.      2.  Area of consolidation seen within the left mid upper lung likely representing pneumonia.          LABORATORY:  Results for orders placed or performed during the hospital encounter of 06/02/17   LACTIC ACID   Result Value Ref Range    Lactic Acid 3.68 (H) 0.50 - 2.00 mmol/L    Specimen Venous    LACTIC ACID   Result Value Ref Range    Lactic Acid 1.79 0.50 - 2.00 mmol/L    Specimen Venous    CBC WITH DIFFERENTIAL   Result Value Ref Range    WBC 15.6 (H) 4.8 - 10.8 K/uL    RBC 4.10 (L) 4.20 - 5.40 M/uL    Hemoglobin 8.4 (L) 12.0 - 16.0 g/dL    Hematocrit 28.6 (L) 37.0 - 47.0 %    MCV 69.8 (L) 81.4 - 97.8 fL    MCH 20.5 (L) 27.0 - 33.0 pg    MCHC 29.4 (L) 33.6 - 35.0 g/dL    RDW 51.8 (H) 35.9 - 50.0 fL    Platelet Count 339 164 - 446 K/uL    MPV 10.0 9.0 - 12.9 fL    Neutrophils-Polys 80.80 (H) 44.00 - 72.00 %    Lymphocytes 12.70 (L) 22.00 - 41.00 %    Monocytes 5.50 0.00 - 13.40 %    Eosinophils 0.30 0.00 - 6.90 %    Basophils 0.30 0.00 - 1.80 %    Immature Granulocytes 0.40 0.00 - 0.90 %    Nucleated RBC 0.30 /100 WBC    Neutrophils (Absolute)  12.58 (H) 2.00 - 7.15 K/uL    Lymphs (Absolute) 1.98 1.00 - 4.80 K/uL    Monos (Absolute) 0.86 (H) 0.00 - 0.85 K/uL    Eos (Absolute) 0.04 0.00 - 0.51 K/uL    Baso (Absolute) 0.05 0.00 - 0.12 K/uL    Immature Granulocytes (abs) 0.07 0.00 - 0.11 K/uL    NRBC (Absolute) 0.05 K/uL    Hypochromia 2+     Anisocytosis 1+     Macrocytosis 1+    COMP METABOLIC PANEL   Result Value Ref Range    Sodium 134 (L) 135 - 145 mmol/L    Potassium 2.9 (L) 3.6 - 5.5 mmol/L    Chloride 100 96 - 112 mmol/L    Co2 23 20 - 33 mmol/L    Anion Gap 11.0 0.0 - 11.9    Glucose 141 (H) 65 - 99 mg/dL    Bun <5 (L) 8 - 22 mg/dL    Creatinine 0.89 0.50 - 1.40 mg/dL    Calcium 8.0 (L) 8.4 - 10.2 mg/dL    AST(SGOT) 39 12 - 45 U/L    ALT(SGPT) 23 2 - 50 U/L    Alkaline Phosphatase 77 30 - 99 U/L    Total Bilirubin 0.6 0.1 - 1.5 mg/dL    Albumin 3.3 3.2 - 4.9 g/dL    Total Protein 6.9 6.0 - 8.2 g/dL    Globulin 3.6 (H) 1.9 - 3.5 g/dL    A-G Ratio 0.9 g/dL   URINALYSIS   Result Value Ref Range    Micro Urine Req Microscopic     Color Yellow     Character Clear     Specific Gravity 1.015 <1.035    Ph 6.0 5.0-8.0    Glucose Negative Negative mg/dL    Ketones Negative Negative mg/dL    Protein 30 (A) Negative mg/dL    Bilirubin Negative Negative    Nitrite Negative Negative    Leukocyte Esterase Negative Negative    Occult Blood Negative Negative   INFLUENZA RAPID   Result Value Ref Range    Significant Indicator NEG     Source RESP     Site RESPIRATORY     Rapid Influenza A-B       Negative for Influenza A and Influenza B antigens.  Infection due to influenza A or B cannot be ruled out  since the antigen present in the specimen may be below the  detection limit of the test. Culture confirmation of  negative samples is recommended.     URINE MICROSCOPIC (W/UA)   Result Value Ref Range    WBC Rare /hpf    RBC 0-2 /hpf    Epithelial Cells Few Few /hpf    Mucous Threads Rare /hpf    Urine Crystals Few Amorphous /hpf       INTERVENTIONS:  Medications    azithromycin (ZITHROMAX) 500 mg in D5W 250 mL IVPB (500 mg Intravenous New Bag 6/2/17 1133)   NS (BOLUS) infusion 3,261 mL (3,261 mL Intravenous Given 6/2/17 0837)   HYDROmorphone (DILAUDID) injection 1 mg (1 mg Intravenous Given 6/2/17 0838)   ondansetron (ZOFRAN) syringe/vial injection 4 mg (4 mg Intravenous Given 6/2/17 0838)   albuterol (PROVENTIL) 2.5mg/0.5ml nebulizer solution 2.5 mg (2.5 mg Nebulization Given 6/2/17 0827)   acetaminophen (TYLENOL) tablet 975 mg (975 mg Oral Given 6/2/17 0855)   cefTRIAXone (ROCEPHIN) injection 2 g (2 g Intravenous Given 6/2/17 1050)   potassium chloride ER (KLOR-CON) tablet 40 mEq (40 mEq Oral Given 6/2/17 1031)     Response: Patient hypoxic to 81% on room air after bronchodilators.    COURSE & MEDICAL DECISION MAKING  Ill-appearing patient presents with left upper lobe pneumonia with hypoxia. There may be some component of COPD. She appears to have early sepsis. There is no severe sepsis or septic shock. There is no UTI. Case discussed with Dr. Herbert hospitalist to admit the patient for oxygen and IV fluids IV antibiotics.    PLAN:  As above.    CONDITION: Fair.    FINAL IMPRESSION  1. Pneumonia of left upper lobe due to methicillin susceptible Staphylococcus aureus (MSSA) (CMS-HCC)    2. Iron deficiency anemia due to chronic blood loss    3. Hypoxia    4.      Sepsis      Electronically signed by: Jamal Mac, 6/2/2017 8:51 AM

## 2017-06-02 NOTE — ED NOTES
Med Rec completed per patient  Allergies reviewed  No ORAL antibiotics in last 30 days    Patient stated she takes no medications

## 2017-06-02 NOTE — FLOWSHEET NOTE
06/02/17 1425   Interdisciplinary Plan of Care-Goals (Indications)   Obstructive Ventilatory Defect or Pulmonary Disease without Obvious Obstruction History / Diagnosis   Hyperinflation Protocol Indications Restrictive Lung Disorder / Consolidation   Interdisciplinary Plan of Care-Outcomes    Bronchodilator Outcome Patient at Stable Baseline   Hyperinflation Protocol Goals/Outcome Increased Vital Capacity or Return to Pre-operative Values   RT Assessment of Delivered Medications   Evaluation of Medication Delivery Daily Yes-- Pt /Family has been Instructed in use of Respiratory Medications and Adverse Reactions   SVN Group   #SVN Performed Yes   Given By: Mouthpiece   Date SVN Last Changed 06/02/17   Date SVN Next Change Due (Q 7 Days) 06/09/17   Incentive Spirometry Group   Incentive Spirometry Instruction Yes   Incentive Spirometer Volume 1000 mL   Incentive Spirometer Date Last Changed 06/02/17   Incentive Spirometer Next Change Date (Q 30 Days) 07/02/17   Respiratory WDL   Respiratory (WDL) X   Chest Exam   Respiration (!) 24   Pulse 88   Breath Sounds   Pre/Post Intervention Pre Intervention Assessment   RUL Breath Sounds Expiratory Wheezes   RML Breath Sounds Diminished;Expiratory Wheezes   RLL Breath Sounds Diminished   TENZIN Breath Sounds Expiratory Wheezes   LLL Breath Sounds Diminished;Expiratory Wheezes   Secretions   Cough Moist;Non Productive   Oximetry   #Pulse Oximetry (Single Determination) Yes   Oxygen   Pulse Oximetry 98 %   O2 (LPM) 4   O2 Daily Delivery Respiratory  Nasal Cannula

## 2017-06-02 NOTE — IP AVS SNAPSHOT
ClubKviar Access Code: [email or web address]  Expires: 7/8/2017  5:04 PM    ClubKviar  A secure, online tool to manage your health information     [employer]s ClubKviar® is a secure, online tool that connects you to your personalized health information from the privacy of your home -- day or night - making it very easy for you to manage your healthcare. Once the activation process is completed, you can even access your medical information using the ClubKviar ruma, which is available for free in the Apple Ruma store or Google Play store.     ClubKviar provides the following levels of access (as shown below):   My Chart Features   [facility] Primary Care Doctor [facility]  Specialists [facility]  Urgent  Care Non-[facility]  Primary Care  Doctor   Email your healthcare team securely and privately 24/7 X X X X   Manage appointments: schedule your next appointment; view details of past/upcoming appointments X      Request prescription refills. X      View recent personal medical records, including lab and immunizations X X X X   View health record, including health history, allergies, medications X X X X   Read reports about your outpatient visits, procedures, consult and ER notes X X X X   See your discharge summary, which is a recap of your hospital and/or ER visit that includes your diagnosis, lab results, and care plan. X X       How to register for ClubKviar:  1. Go to  [email or web address].  2. Click on the Sign Up Now box, which takes you to the New Member Sign Up page. You will need to provide the following information:  a. Enter your ClubKviar Access Code exactly as it appears at the top of this page. (You will not need to use this code after you’ve completed the sign-up process. If you do not sign up before the expiration date, you must request a new code.)   b. Enter your date of birth.   c. Enter your home email address.   d. Click Submit, and follow the next screen’s instructions.  3. Create a ClubKviar ID. This will be your ClubKviar  login ID and cannot be changed, so think of one that is secure and easy to remember.  4. Create a [employer] password. You can change your password at any time.  5. Enter your Password Reset Question and Answer. This can be used at a later time if you forget your password.   6. Enter your e-mail address. This allows you to receive e-mail notifications when new information is available in [employer].  7. Click Sign Up. You can now view your health information.    For assistance activating your [employer] account, call [phone]

## 2017-06-02 NOTE — H&P
DATE OF ADMISSION:  06/02/2017    REASON FOR ADMISSION:  Pneumonia with right hypoxia and acute respiratory   failure.    HISTORY OF PRESENT ILLNESS:  This is a 46-year-old -American obese   female that has been battling 1 week of cough and upper respiratory infection   type symptoms.  Over the last several days, she had marked increase in work of   breathing and severe myalgias and body aches.  Today, she felt as though she   was not going to make it due to the increased work of breathing and came to   the emergency room.  On presentation, oxygen saturation measured 80% on room   air.  She is breathing 30 times a minute.  She also got significant lactic   acidosis of 3.7, likely hypoxia related as well as marked leukocytosis   consistent with glenis sepsis.  She will be admitted, treated for sepsis and   acute respiratory failure and pneumonia.    She is also somewhat wheezy.  She is an ongoing smoker, although she has been   trying to quit.  She has somewhat prolonged expiratory phase and glenis audible   wheeze.  She notes the cough productive of green sputum.  It is painful when   she coughs.  There is no substernal chest pain.    She denies nausea, vomiting, diarrhea, constipation, dysuria, black or bloody   stools, focal neurologic deficit, or skin rash.  She has had fevers and chills   with myalgias.  The remainder review of systems is negative per CMS   guidelines.    PAST MEDICAL HISTORY:  1.  Obesity.  2.  Probable early COPD given heavy and ongoing smoking history.  3.  Query diabetes mellitus.    PAST SURGICAL HISTORY:  None.    SOCIAL HISTORY:  She stopped smoking several days ago.  She does occasionally   drink alcohol.  She is followed by Dr. Edis Rivas.    FAMILY HISTORY:  Reviewed, noncontributory.    CURRENT MEDICATIONS:  None.    ALLERGIES:  None.    REVIEW OF SYSTEMS:  As in the HPI.    PHYSICAL EXAMINATION:  VITAL SIGNS:  Blood pressure 154/77 initially, she is currently 95/60, pulse    110, temperature is 102.8, respiratory rate in the high 20s.  GENERAL:  She is very ill appearing, tired, tachypneic.  HEENT:  Pupils are equal, round and reactive.  Extraocular movements are   intact.  Mucous membranes are moist.  NECK:  Supple without jugular venous distention, lymphadenopathy or bruit.  CARDIOVASCULAR:  She is tachycardic, but regular.  LUNGS:  She has audible expiratory wheeze and prolonged expiratory phase.  ABDOMEN:  Obese, soft, nontender, nondistended.  Bowel sounds are present.    There is no palpable organomegaly.  BACK:  No CVA tenderness.  EXTREMITIES:  Warm.  There is no edema.  NEUROLOGIC:  Nonfocal.  SKIN:  There is no obvious rash.    LABORATORY DATA:  White count 16, hemoglobin 8, hematocrit 29, mean cell   volume is 70, platelets 339, neutrophils 81%, lymphocytes 12%.  Sodium 134,   potassium 2.9, chloride 100, bicarbonate 23, glucose 141, BUN less than 5,   creatinine 0.9, calcium 8.0, AST 39, ALT 23, alkaline phosphatase 77, total   bilirubin 0.8, albumin 3.3, total protein is 6.9.  Urinalysis is negative.    Influenza is negative.  Lactic acid initially 3.68, currently 1.8.    STUDIES:  Chest x-ray notes left upper lobe pneumonia.    IMPRESSION:  1.  Left upper lobe pneumonia.  2.  Acute respiratory failure.  3.  Probable early disease with significant wheezing.  4.  Profound microcytic anemia, query menorrhagia related.  5.  Obesity.  6.  Smoker.  7.  Hypokalemia.  8.  Lactic acidosis.  9.  Sepsis.    PLAN:  1.  For sepsis and pneumonia.  She will be admitted to a monitored bed.  Close   watch for transfer to the ICU.  We will provide aggressive antibiotics   coverage community-acquired pneumonia including Rocephin and azithromycin.  We   will draw sputum cultures.  We will provide aerosolized bronchodilators and   supplemental oxygen as well as aggressive fluid resuscitation, given that she   is currently hypotensive.  Follow lactic acid closely.  2.  Probable chronic  obstructive pulmonary disease.  I will place her on a   short course of prednisone.  This should help from a respiratory standpoint,   given significant tightness.  Consider aerosolized bronchodilators routinely.  3.  Hyperglycemia.  We will check hemoglobin A1c.  We will provide sliding   scale insulin.  4.  Hypokalemia.  This will be replaced orally and with IV fluids.  Provide   magnesium.  5.  Anemia.  We will check iron panel, ferritin, B12, folate, and stool for   occult blood.  I suspect this is menorrhagia related.  6.  Prophylaxis.  Lovenox, laxatives.       ____________________________________     MD NINO CHERY / KALA    DD:  06/02/2017 12:12:38  DT:  06/02/2017 16:07:36    D#:  [id number]  Job#:  [id number]    cc: JUANA AMES MD

## 2017-06-02 NOTE — ED NOTES
"Chief Complaint   Patient presents with   • Fever     x 1 week   • Cough     x 1 week   • Body Aches   • Chest Wall Pain     /77 mmHg  Pulse 104  Temp(Src) 39.3 °C (102.8 °F)  Resp 20  Ht 1.702 m (5' 7\")  Wt 108.7 kg (239 lb 10.2 oz)  BMI 37.52 kg/m2  SpO2 96%    "

## 2017-06-02 NOTE — IP AVS SNAPSHOT
" <p align=\"LEFT\"><IMG SRC=\"[email or web address]\" alt=\"Image\" WIDTH=\"50%\" HEIGHT=\"200\" BORDER=\"\"></p>                   Name:Joann Deshpande  Medical Record Number:[id number]  CSN: [id number]    YOB: 1970   Age: 46 y.o.  Sex: female  HT:1.702 m (5' 7\") WT: 108.7 kg (239 lb 10.2 oz)          Admit Date: 6/2/2017     Discharge Date:   Today's Date: 6/8/2017  Attending Doctor:  Kim Zuñiga D.O.                  Allergies:  Review of patient's allergies indicates no known allergies.          Follow-up Information     1. Follow up with Edis Rivas M.D.. Schedule an appointment as soon as possible for a visit in 3 days.    Specialty:  Cardiology    Why:  As needed    Contact information    [address] [id number] [phone]           Medication List      Take these Medications        Instructions    albuterol 108 (90 BASE) MCG/ACT Aers inhalation aerosol    Inhale 2 Puffs by mouth every 6 hours as needed for Shortness of Breath.   Dose:  2 Puff       ferrous sulfate 325 (65 FE) MG tablet    Take 1 Tab by mouth every day.   Dose:  325 mg       fluticasone-salmeterol 250-50 MCG/DOSE Aepb   Commonly known as:  ADVAIR    Inhale 1 Puff by mouth every 12 hours.   Dose:  1 Puff       furosemide 40 MG Tabs   Commonly known as:  LASIX    Take 1 Tab by mouth every day.   Dose:  40 mg       hydrocodone-acetaminophen 5-325 MG Tabs per tablet   Commonly known as:  NORCO    Take 1-2 Tabs by mouth every four hours as needed (mild pain).   Dose:  1-2 Tab       levofloxacin 500 MG tablet   Commonly known as:  LEVAQUIN    Take 1 Tab by mouth every day for 7 days.   Dose:  500 mg       ondansetron 4 MG Tbdp   Commonly known as:  ZOFRAN ODT    Take 1 Tab by mouth every 8 hours as needed for Nausea/Vomiting.   Dose:  4 mg       SUMAtriptan 25 MG Tabs tablet   Commonly known as:  IMITREX    Take 1-4 Tabs by mouth Once PRN for Migraine for up to 1 dose.   Dose:   mg         "

## 2017-06-03 ENCOUNTER — APPOINTMENT (OUTPATIENT)
Dept: RADIOLOGY | Facility: MEDICAL CENTER | Age: 47
DRG: 871 | End: 2017-06-03
Attending: INTERNAL MEDICINE
Payer: COMMERCIAL

## 2017-06-03 PROBLEM — A41.9 SEPSIS, UNSPECIFIED: Status: ACTIVE | Noted: 2017-06-03

## 2017-06-03 PROBLEM — E87.6 HYPOKALEMIA: Status: ACTIVE | Noted: 2017-06-03

## 2017-06-03 PROBLEM — D72.829 LEUCOCYTOSIS: Status: ACTIVE | Noted: 2017-06-03

## 2017-06-03 PROBLEM — E87.1 HYPONATREMIA: Status: ACTIVE | Noted: 2017-06-03

## 2017-06-03 PROBLEM — R73.9 HYPERGLYCEMIA: Status: ACTIVE | Noted: 2017-06-03

## 2017-06-03 PROBLEM — E66.9 OBESITY: Status: ACTIVE | Noted: 2017-06-03

## 2017-06-03 PROBLEM — F17.200 SMOKING: Status: ACTIVE | Noted: 2017-06-03

## 2017-06-03 PROBLEM — D64.9 ANEMIA: Status: ACTIVE | Noted: 2017-06-03

## 2017-06-03 LAB
ANION GAP SERPL CALC-SCNC: 8 MMOL/L (ref 0–11.9)
BUN SERPL-MCNC: 5 MG/DL (ref 8–22)
CALCIUM SERPL-MCNC: 7.3 MG/DL (ref 8.4–10.2)
CHLORIDE SERPL-SCNC: 110 MMOL/L (ref 96–112)
CO2 SERPL-SCNC: 20 MMOL/L (ref 20–33)
CREAT SERPL-MCNC: 0.79 MG/DL (ref 0.5–1.4)
EKG IMPRESSION: NORMAL
ERYTHROCYTE [DISTWIDTH] IN BLOOD BY AUTOMATED COUNT: 56.3 FL (ref 35.9–50)
GFR SERPL CREATININE-BSD FRML MDRD: >60 ML/MIN/1.73 M 2
GLUCOSE BLD-MCNC: 136 MG/DL (ref 65–99)
GLUCOSE BLD-MCNC: 146 MG/DL (ref 65–99)
GLUCOSE BLD-MCNC: 153 MG/DL (ref 65–99)
GLUCOSE BLD-MCNC: 170 MG/DL (ref 65–99)
GLUCOSE SERPL-MCNC: 178 MG/DL (ref 65–99)
HCT VFR BLD AUTO: 24.5 % (ref 37–47)
HGB BLD-MCNC: 7.1 G/DL (ref 12–16)
LACTATE BLD-SCNC: 1.78 MMOL/L (ref 0.5–2)
LACTATE BLD-SCNC: 2.11 MMOL/L (ref 0.5–2)
LACTATE BLD-SCNC: 3.08 MMOL/L (ref 0.5–2)
MCH RBC QN AUTO: 20.8 PG (ref 27–33)
MCHC RBC AUTO-ENTMCNC: 29 G/DL (ref 33.6–35)
MCV RBC AUTO: 71.6 FL (ref 81.4–97.8)
PLATELET # BLD AUTO: 267 K/UL (ref 164–446)
PMV BLD AUTO: 9.9 FL (ref 9–12.9)
POTASSIUM SERPL-SCNC: 5.1 MMOL/L (ref 3.6–5.5)
RBC # BLD AUTO: 3.42 M/UL (ref 4.2–5.4)
SODIUM SERPL-SCNC: 138 MMOL/L (ref 135–145)
SPECIMEN DRAWN FROM PATIENT: ABNORMAL
SPECIMEN DRAWN FROM PATIENT: ABNORMAL
SPECIMEN DRAWN FROM PATIENT: NORMAL
WBC # BLD AUTO: 19.7 K/UL (ref 4.8–10.8)

## 2017-06-03 PROCEDURE — 83605 ASSAY OF LACTIC ACID: CPT

## 2017-06-03 PROCEDURE — 770020 HCHG ROOM/CARE - TELE (206)

## 2017-06-03 PROCEDURE — 99407 BEHAV CHNG SMOKING > 10 MIN: CPT | Performed by: INTERNAL MEDICINE

## 2017-06-03 PROCEDURE — 36415 COLL VENOUS BLD VENIPUNCTURE: CPT

## 2017-06-03 PROCEDURE — 94640 AIRWAY INHALATION TREATMENT: CPT

## 2017-06-03 PROCEDURE — 82962 GLUCOSE BLOOD TEST: CPT

## 2017-06-03 PROCEDURE — 99233 SBSQ HOSP IP/OBS HIGH 50: CPT | Performed by: INTERNAL MEDICINE

## 2017-06-03 PROCEDURE — 700105 HCHG RX REV CODE 258: Performed by: INTERNAL MEDICINE

## 2017-06-03 PROCEDURE — 700102 HCHG RX REV CODE 250 W/ 637 OVERRIDE(OP): Performed by: INTERNAL MEDICINE

## 2017-06-03 PROCEDURE — A9270 NON-COVERED ITEM OR SERVICE: HCPCS | Performed by: INTERNAL MEDICINE

## 2017-06-03 PROCEDURE — 94760 N-INVAS EAR/PLS OXIMETRY 1: CPT

## 2017-06-03 PROCEDURE — 700111 HCHG RX REV CODE 636 W/ 250 OVERRIDE (IP): Performed by: INTERNAL MEDICINE

## 2017-06-03 PROCEDURE — 700101 HCHG RX REV CODE 250: Performed by: INTERNAL MEDICINE

## 2017-06-03 PROCEDURE — 85027 COMPLETE CBC AUTOMATED: CPT

## 2017-06-03 PROCEDURE — 80048 BASIC METABOLIC PNL TOTAL CA: CPT

## 2017-06-03 PROCEDURE — 71010 DX-CHEST-PORTABLE (1 VIEW): CPT

## 2017-06-03 RX ORDER — SODIUM CHLORIDE 9 MG/ML
INJECTION, SOLUTION INTRAVENOUS CONTINUOUS
Status: DISCONTINUED | OUTPATIENT
Start: 2017-06-03 | End: 2017-06-04

## 2017-06-03 RX ORDER — AZITHROMYCIN 250 MG/1
250 TABLET, FILM COATED ORAL DAILY
Status: COMPLETED | OUTPATIENT
Start: 2017-06-03 | End: 2017-06-06

## 2017-06-03 RX ORDER — FERROUS SULFATE 325(65) MG
325 TABLET ORAL
Status: DISCONTINUED | OUTPATIENT
Start: 2017-06-03 | End: 2017-06-08 | Stop reason: HOSPADM

## 2017-06-03 RX ADMIN — PREDNISONE 50 MG: 50 TABLET ORAL at 09:36

## 2017-06-03 RX ADMIN — LACTOBACILLUS ACIDOPHILUS / LACTOBACILLUS BULGARICUS 1 PACKET: 100 MILLION CFU STRENGTH GRANULES at 11:41

## 2017-06-03 RX ADMIN — OXYCODONE HYDROCHLORIDE 5 MG: 5 TABLET ORAL at 06:39

## 2017-06-03 RX ADMIN — LACTOBACILLUS ACIDOPHILUS / LACTOBACILLUS BULGARICUS 1 PACKET: 100 MILLION CFU STRENGTH GRANULES at 16:35

## 2017-06-03 RX ADMIN — INSULIN LISPRO 2 UNITS: 100 INJECTION, SOLUTION INTRAVENOUS; SUBCUTANEOUS at 06:38

## 2017-06-03 RX ADMIN — INSULIN LISPRO 2 UNITS: 100 INJECTION, SOLUTION INTRAVENOUS; SUBCUTANEOUS at 17:41

## 2017-06-03 RX ADMIN — FERROUS SULFATE TAB 325 MG (65 MG ELEMENTAL FE) 325 MG: 325 (65 FE) TAB at 16:35

## 2017-06-03 RX ADMIN — FERROUS SULFATE TAB 325 MG (65 MG ELEMENTAL FE) 325 MG: 325 (65 FE) TAB at 11:40

## 2017-06-03 RX ADMIN — ALBUTEROL SULFATE 2.5 MG: 2.5 SOLUTION RESPIRATORY (INHALATION) at 11:50

## 2017-06-03 RX ADMIN — SODIUM CHLORIDE: 9 INJECTION, SOLUTION INTRAVENOUS at 20:53

## 2017-06-03 RX ADMIN — OXYCODONE HYDROCHLORIDE 5 MG: 5 TABLET ORAL at 11:43

## 2017-06-03 RX ADMIN — Medication 400 MG: at 09:36

## 2017-06-03 RX ADMIN — OXYCODONE HYDROCHLORIDE 5 MG: 5 TABLET ORAL at 16:35

## 2017-06-03 RX ADMIN — Medication 400 MG: at 20:47

## 2017-06-03 RX ADMIN — OXYCODONE HYDROCHLORIDE 5 MG: 5 TABLET ORAL at 20:51

## 2017-06-03 RX ADMIN — ENOXAPARIN SODIUM 40 MG: 100 INJECTION SUBCUTANEOUS at 09:36

## 2017-06-03 RX ADMIN — LACTOBACILLUS ACIDOPHILUS / LACTOBACILLUS BULGARICUS 1 PACKET: 100 MILLION CFU STRENGTH GRANULES at 09:36

## 2017-06-03 RX ADMIN — CEFTRIAXONE 2 G: 2 INJECTION, POWDER, FOR SOLUTION INTRAMUSCULAR; INTRAVENOUS at 09:36

## 2017-06-03 RX ADMIN — SODIUM CHLORIDE: 9 INJECTION, SOLUTION INTRAVENOUS at 09:35

## 2017-06-03 RX ADMIN — AZITHROMYCIN 250 MG: 250 TABLET, FILM COATED ORAL at 09:36

## 2017-06-03 ASSESSMENT — ENCOUNTER SYMPTOMS
PND: 0
DIARRHEA: 0
BLURRED VISION: 0
HEARTBURN: 0
WEAKNESS: 0
FALLS: 0
CONSTIPATION: 0
DEPRESSION: 0
SPEECH CHANGE: 0
TREMORS: 0
SPUTUM PRODUCTION: 0
NAUSEA: 0
VOMITING: 0
COUGH: 1
SEIZURES: 0
EYE PAIN: 0
PALPITATIONS: 0
DIZZINESS: 0
BACK PAIN: 0
ABDOMINAL PAIN: 0
FEVER: 1
WEIGHT LOSS: 0
CHILLS: 0
NECK PAIN: 0
HEADACHES: 0
SHORTNESS OF BREATH: 1
WHEEZING: 0

## 2017-06-03 ASSESSMENT — PAIN SCALES - GENERAL
PAINLEVEL_OUTOF10: 6
PAINLEVEL_OUTOF10: 5
PAINLEVEL_OUTOF10: 2

## 2017-06-03 ASSESSMENT — LIFESTYLE VARIABLES: SUBSTANCE_ABUSE: 0

## 2017-06-03 NOTE — CARE PLAN
Problem: Oxygenation:  Goal: Maintain adequate oxygenation dependent on patient condition  Outcome: PROGRESSING SLOWER THAN EXPECTED  Titrate and maintain saturation by pulse oximetry >90%. Current fiO2 3 lpm nasal cannula.          Problem: Bronchoconstriction:  Goal: Improve in air movement and diminished wheezing  Outcome: PROGRESSING AS EXPECTED     Improvement in airflow    Improved vital signs and measures of gas exchange    Improved patient appearance with subjective improvement of symptom alleviation after treatment.        Intervention: Implement inhaled treatments  Albuterol QID   Intervention: Evaluate and manage medication effects  Slight  subjective or objective improvements       Problem: Hyperinflation:  Goal: Prevent or improve atelectasis  Absence of or improvement in signs of atelectasis    Improved Vital capacity(Greater than 60% of  I.S values)      Improvement in CXR    Improved inspiratory muscle performance.      Pt encouraged to perform I.S. Independently. Pt encouraged to perform I.S. 10 times per hr while awake  Intervention: Instruct incentive spirometry usage  1400 I.S. value

## 2017-06-03 NOTE — CARE PLAN
Problem: Safety  Goal: Will remain free from injury  Outcome: PROGRESSING AS EXPECTED  Pt is injury-free at this moment   Fall precautions in place. Bed locked. Bed at lowest position. Treaded socks on  Call light and personal belongings within reach.   Hourly rounding in progress     Problem: Pain Management  Goal: Pain level will decrease to patient’s comfort goal  Outcome: PROGRESSING AS EXPECTED  Pt currently receiving prn Oxycodone 5mg for pain to bilat rib cage   Continue to medicate pain meds around the clock to keep pain under control & offer pt ice pack.   Monitor VS before medicating pain meds.     Problem: Respiratory:  Goal: Respiratory status will improve  Outcome: PROGRESSING AS EXPECTED  Pt currently receiving respiratory therapy & on 4L of oxygen via nasal cannula   Continue to monitor and keep O2 Sat above 90%

## 2017-06-03 NOTE — FLOWSHEET NOTE
06/02/17 1828   Events/Summary/Plan   Non-Invasive Resp Device Site Inspection Completed Intact   Interdisciplinary Plan of Care-Goals (Indications)   Obstructive Ventilatory Defect or Pulmonary Disease without Obvious Obstruction History / Diagnosis;PFT / Reduced Airflow   Hyperinflation Protocol Indications Restrictive Lung Disorder / Consolidation   Interdisciplinary Plan of Care-Outcomes    Bronchodilator Outcome Patient at Stable Baseline   Hyperinflation Protocol Goals/Outcome Stable Vital Capacity x24 hrs and Patient Understands / uses I.S.   Education   Education Yes - Pt. / Family has been Instructed in use of Respiratory Equipment;Yes - Pt. / Family has been Instructed in use of Respiratory Medications and Adverse Reactions   RT Assessment of Delivered Medications   Evaluation of Medication Delivery Daily Yes-- Pt /Family has been Instructed in use of Respiratory Medications and Adverse Reactions   SVN Group   #SVN Performed Yes   Given By: Mouthpiece   Incentive Spirometry Group   Incentive Spirometry Instruction Yes   Breathing Exercises Yes   Incentive Spirometer Volume 1000 mL   Respiratory WDL   Respiratory (WDL) X   Chest Exam   Work Of Breathing / Effort Mild   Respiration (!) 22   Pulse 92   Breath Sounds   Pre/Post Intervention Pre Intervention Assessment   RUL Breath Sounds Fine Crackles   RML Breath Sounds Fine Crackles   RLL Breath Sounds Fine Crackles   TENZIN Breath Sounds Fine Crackles   LLL Breath Sounds Fine Crackles   Secretions   Cough Non Productive   Oximetry   #Pulse Oximetry (Single Determination) Yes   Oxygen   Home O2 Use Prior To Admission? No   Pulse Oximetry 97 %   O2 (LPM) 3   O2 Daily Delivery Respiratory  Silicone Nasal Cannula

## 2017-06-03 NOTE — PROGRESS NOTES
1L NS bolus completed, VSS. Paged on-call Rosy MART for lactic acid redraw order. Lactic acid will be redrawing at 0200.

## 2017-06-03 NOTE — CARE PLAN
Problem: Pain Management  Goal: Pain level will decrease to patient’s comfort goal  Outcome: PROGRESSING AS EXPECTED  Numeric scale to assess pain. Medicating with prn medication per MAR. Encouraging rest/reposition for comfort.     Problem: Respiratory:  Goal: Respiratory status will improve  Outcome: PROGRESSING AS EXPECTED  O2 in place as needed. Abx/steroids per MAR. Encouraging cough/turn/deep breathe/IS.

## 2017-06-03 NOTE — PROGRESS NOTES
Hospital Medicine Interval Note  Date of Service:  6/3/2017    Chief Complaint  46 y.o.-year-old female admitted 6/2/2017 with PNA and sepsis    Interval Problem Update  6/3 feeling still SOB and some R chest pain. Patient's pain is local, 4-6/10, intermittent and does not radiate to other location, sharp and with some tingling. Can be controlled by pain meds. BP boarderline.    Consultants/Specialty  none    Disposition  Home when stable     Review of Systems   Constitutional: Positive for fever. Negative for chills and weight loss.   HENT: Negative for congestion, ear discharge, ear pain, hearing loss and nosebleeds.    Eyes: Negative for blurred vision and pain.   Respiratory: Positive for cough and shortness of breath. Negative for sputum production and wheezing.    Cardiovascular: Negative for chest pain, palpitations, leg swelling and PND.   Gastrointestinal: Negative for heartburn, nausea, vomiting, abdominal pain, diarrhea and constipation.   Genitourinary: Negative for dysuria, frequency and hematuria.   Musculoskeletal: Negative for back pain, joint pain, falls and neck pain.   Skin: Negative for rash.   Neurological: Negative for dizziness, tremors, speech change, seizures, weakness and headaches.   Psychiatric/Behavioral: Negative for depression, suicidal ideas and substance abuse.      Physical Exam Laboratory/Imaging   Filed Vitals:    06/03/17 0359 06/03/17 0800 06/03/17 0955 06/03/17 1152   BP: 107/41 133/78     Pulse: 81 95 72 81   Temp: 37.1 °C (98.7 °F) 36.8 °C (98.3 °F)     Resp: 20 20 18 20   Height:       Weight:       SpO2: 94% 92% 95% 95%     Physical Exam   Constitutional: She is oriented to person, place, and time. She appears well-developed and well-nourished.   HENT:   Head: Normocephalic.   Nose: Nose normal.   Mouth/Throat: No oropharyngeal exudate.   Eyes: EOM are normal. Pupils are equal, round, and reactive to light.   Neck: Normal range of motion. Neck supple. No JVD present. No  thyromegaly present.   Cardiovascular: Normal rate, regular rhythm and normal heart sounds.  Exam reveals no gallop and no friction rub.    No murmur heard.  Pulmonary/Chest: Effort normal and breath sounds normal. No respiratory distress. She has no wheezes. She has no rales.   Abdominal: Soft. Bowel sounds are normal. She exhibits no distension and no mass. There is no tenderness. There is no rebound and no guarding.   Musculoskeletal: Normal range of motion. She exhibits no edema or tenderness.   Lymphadenopathy:     She has no cervical adenopathy.   Neurological: She is alert and oriented to person, place, and time. No cranial nerve deficit.   Skin: Skin is warm. No rash noted.   Psychiatric: Her behavior is normal.    Lab Results   Component Value Date/Time    WBC 19.7* 06/03/2017 01:55 AM    HEMOGLOBIN 7.1* 06/03/2017 01:55 AM    HEMATOCRIT 24.5* 06/03/2017 01:55 AM    PLATELET COUNT 267 06/03/2017 01:55 AM     Lab Results   Component Value Date/Time    SODIUM 138 06/03/2017 01:55 AM    POTASSIUM 5.1 06/03/2017 01:55 AM    CHLORIDE 110 06/03/2017 01:55 AM    CO2 20 06/03/2017 01:55 AM    GLUCOSE 178* 06/03/2017 01:55 AM    BUN 5* 06/03/2017 01:55 AM    CREATININE 0.79 06/03/2017 01:55 AM      Assessment/Plan    * Pneumonia, community acquired  Assessment & Plan  Sob and sputum  Leucocytosis  CX L lung PNA  Cont iv rocephin and azithromycin  RT O2 BD  DC steroid since no wheezing    Acute respiratory failure (CMS-[facility])  Assessment & Plan  o2 rt and BD  Treat PNA  See above    Obesity  Assessment & Plan  eduction    Leucocytosis  Assessment & Plan  From infection and steroid      Anemia  Assessment & Plan  Hgb 8->7  Possible dilution  Cont to monitor  FOBT (-)  Iron low  Added po iron  Possible from heavy mens     Hypokalemia  Assessment & Plan  K 2.9-5.1 resolved  Cont to monitor    Hyperglycemia  Assessment & Plan  2/2 stress and steriod  Cont to monitor    Hyponatremia  Assessment &  Plan  ivf    Smoking  Assessment & Plan  Education provided      Sepsis (CMS-HCC)  Assessment & Plan  2/2 PNA  Leucocytosis, elevated lactic acid  Cont ivf and iv abx  See above  Culture pending     Labs reviewed, Radiology images reviewed and Medications reviewed  Anderson catheter: No Anderson        DVT prophylaxis - mechanical: SCDs  Ulcer prophylaxis: Not indicated  Antibiotics: Treating active infection/contamination beyond 24 hours perioperative coverage  Assessed for rehab: Patient returned to prior level of function, rehabilitation not indicated at this time      For complexity-based billing, please refer to the history, exam, and decison making above. In addition, I spent 35 minutes caring for the patient today. More than 50% of the time was spent counseling and coordinating care.    I have discussed with RN and CM and SW and other consultants about patient's plan.     Spent 11 minutes on tobacco cessation counseling including nicotine patches, gum, and dangers of smoking.    The pt indicated that will quit.     [id number] (smoking and tobacco cessation counseling visit; intensive, greater than 10 minutes).

## 2017-06-03 NOTE — PROGRESS NOTES
Current lactic acid 3.28, VSS. Paged on-call hopsitalist for update. Order received; NS 1L bolus.

## 2017-06-03 NOTE — PROGRESS NOTES
Tele strip at 2002 shows Sinus rhythm with a HR of 97.      Measurements: .16/.08/.36    Tele Shift Summary:    Rhythm : Sinus rhythm/tachycardia  Rate : 70s to 110s    Ectopy : Per CCT Hugh pt had frequent PVCs/couplets    Telemetry monitoring strips scanned to GSU, primary RN to monitor

## 2017-06-03 NOTE — CARE PLAN
Problem: Venous Thromboembolism (VTW)/Deep Vein Thrombosis (DVT) Prevention:  Goal: Patient will participate in Venous Thrombosis (VTE)/Deep Vein Thrombosis (DVT)Prevention Measures    06/02/17 2037   OTHER   Risk Assessment Score 1   VTE RISK Moderate   Pharmacologic Prophylaxis Used LMWH: Enoxaparin(Lovenox)         Problem: Knowledge Deficit  Goal: Knowledge of disease process/condition, treatment plan, diagnostic tests, and medications will improve  The plan of care for today discussed with patient (Rest, pain management, breathing treatments as needed, antibiotic administration)

## 2017-06-03 NOTE — FLOWSHEET NOTE
06/03/17 1152   Events/Summary/Plan   Events/Summary/Plan Pt requested svn   Interdisciplinary Plan of Care-Goals (Indications)   Obstructive Ventilatory Defect or Pulmonary Disease without Obvious Obstruction History / Diagnosis   SVN Group   #SVN Performed Yes   Given By: Mouthpiece   Incentive Spirometry Group   Breathing Exercises Yes   Incentive Spirometer Volume 1250 mL   Chest Exam   Work Of Breathing / Effort Mild   Respiration 20  (post 20)   Pulse 81  (post 80)   Heart Rate (Monitored) 81   Breath Sounds   Pre/Post Intervention Pre Intervention Assessment  (increased aeration after svn)   RUL Breath Sounds Diminished   RML Breath Sounds Diminished   RLL Breath Sounds Diminished   TENZIN Breath Sounds Diminished   LLL Breath Sounds Diminished   Oximetry   #Pulse Oximetry (Single Determination) Yes   Oxygen   Pulse Oximetry 95 %   O2 (LPM) 4   O2 Daily Delivery Respiratory  Nasal Cannula

## 2017-06-03 NOTE — ASSESSMENT & PLAN NOTE
Sob and sputum  Leucocytosis  CX L lung PNA  Cont iv rocephin and azithromycin  RT O2 BD  DC steroid since no wheezing

## 2017-06-03 NOTE — ASSESSMENT & PLAN NOTE
Hgb 8->7->7.1->7.4  Possible dilution  Cont to monitor  FOBT (-)  Iron low  Added po iron  Possible from heavy mens

## 2017-06-03 NOTE — PROGRESS NOTES
Received report from day shift nurse. Assumed pt care at 1915. Pt is A&Ox4, resting comfortably in bed. Pt on 3L of oxygen via nasal cannula. No signs of SOB/respiratory distress; however pt experiencing shallow & tachypnea. Labs noted, VSS. Pt c/o headache and pain to bilat lower rib cage; given Oxycodone 5mg per MAR & ice pack to the back of the neck. Fall precautions in place. Bed locked. Bed at lowest position. Call light and personal belongings within reach. Continue to monitor

## 2017-06-03 NOTE — PROGRESS NOTES
Telemetry Summary    Rhythm Interpretation: Sinus Rhythm with PVC's at times   Heart Rate: 78  OH Interval: 0.16  QRS Interval: 0.08  QT Interval: 0.38

## 2017-06-03 NOTE — PROGRESS NOTES
Pt resting in bed, respirations even, somewhat dyspneic, no changes from previous assessment. Medicated for pain. No other needs at this time.

## 2017-06-03 NOTE — PROGRESS NOTES
Discussed lactic with Dr. Herbert. Vitals stable at this time, no changes in respiratory rate. Will keep continuous fluids, redraw lactic in four hours.

## 2017-06-04 ENCOUNTER — APPOINTMENT (OUTPATIENT)
Dept: RADIOLOGY | Facility: MEDICAL CENTER | Age: 47
DRG: 871 | End: 2017-06-04
Attending: NURSE PRACTITIONER
Payer: COMMERCIAL

## 2017-06-04 PROBLEM — J81.0 ACUTE PULMONARY EDEMA (HCC): Status: ACTIVE | Noted: 2017-06-04

## 2017-06-04 LAB
ALBUMIN SERPL BCP-MCNC: 3.3 G/DL (ref 3.2–4.9)
ALBUMIN/GLOB SERPL: 0.8 G/DL
ALP SERPL-CCNC: 78 U/L (ref 30–99)
ALT SERPL-CCNC: 40 U/L (ref 2–50)
ANION GAP SERPL CALC-SCNC: 12 MMOL/L (ref 0–11.9)
ANION GAP SERPL CALC-SCNC: 8 MMOL/L (ref 0–11.9)
AST SERPL-CCNC: 56 U/L (ref 12–45)
BACTERIA SPEC RESP CULT: NORMAL
BACTERIA UR CULT: NORMAL
BASOPHILS # BLD AUTO: 0.1 % (ref 0–1.8)
BASOPHILS # BLD: 0.02 K/UL (ref 0–0.12)
BILIRUB SERPL-MCNC: 0.2 MG/DL (ref 0.1–1.5)
BUN SERPL-MCNC: 6 MG/DL (ref 8–22)
BUN SERPL-MCNC: 6 MG/DL (ref 8–22)
CALCIUM SERPL-MCNC: 7.6 MG/DL (ref 8.4–10.2)
CALCIUM SERPL-MCNC: 7.9 MG/DL (ref 8.4–10.2)
CHLORIDE SERPL-SCNC: 101 MMOL/L (ref 96–112)
CHLORIDE SERPL-SCNC: 108 MMOL/L (ref 96–112)
CO2 SERPL-SCNC: 23 MMOL/L (ref 20–33)
CO2 SERPL-SCNC: 24 MMOL/L (ref 20–33)
CREAT SERPL-MCNC: 0.64 MG/DL (ref 0.5–1.4)
CREAT SERPL-MCNC: 0.76 MG/DL (ref 0.5–1.4)
EOSINOPHIL # BLD AUTO: 0.03 K/UL (ref 0–0.51)
EOSINOPHIL NFR BLD: 0.2 % (ref 0–6.9)
ERYTHROCYTE [DISTWIDTH] IN BLOOD BY AUTOMATED COUNT: 55.3 FL (ref 35.9–50)
GFR SERPL CREATININE-BSD FRML MDRD: >60 ML/MIN/1.73 M 2
GFR SERPL CREATININE-BSD FRML MDRD: >60 ML/MIN/1.73 M 2
GLOBULIN SER CALC-MCNC: 3.9 G/DL (ref 1.9–3.5)
GLUCOSE BLD-MCNC: 115 MG/DL (ref 65–99)
GLUCOSE BLD-MCNC: 119 MG/DL (ref 65–99)
GLUCOSE BLD-MCNC: 137 MG/DL (ref 65–99)
GLUCOSE BLD-MCNC: 138 MG/DL (ref 65–99)
GLUCOSE SERPL-MCNC: 112 MG/DL (ref 65–99)
GLUCOSE SERPL-MCNC: 121 MG/DL (ref 65–99)
HCT VFR BLD AUTO: 24.5 % (ref 37–47)
HGB BLD-MCNC: 7.1 G/DL (ref 12–16)
IMM GRANULOCYTES # BLD AUTO: 0.13 K/UL (ref 0–0.11)
IMM GRANULOCYTES NFR BLD AUTO: 0.8 % (ref 0–0.9)
LV EJECT FRACT  99904: 65
LV EJECT FRACT MOD 2C 99903: 59.3
LV EJECT FRACT MOD 4C 99902: 67.82
LV EJECT FRACT MOD BP 99901: 62.95
LYMPHOCYTES # BLD AUTO: 3.29 K/UL (ref 1–4.8)
LYMPHOCYTES NFR BLD: 19 % (ref 22–41)
MAGNESIUM SERPL-MCNC: 1.6 MG/DL (ref 1.5–2.5)
MCH RBC QN AUTO: 20.8 PG (ref 27–33)
MCHC RBC AUTO-ENTMCNC: 29 G/DL (ref 33.6–35)
MCV RBC AUTO: 71.6 FL (ref 81.4–97.8)
MONOCYTES # BLD AUTO: 0.9 K/UL (ref 0–0.85)
MONOCYTES NFR BLD AUTO: 5.2 % (ref 0–13.4)
NEUTROPHILS # BLD AUTO: 12.96 K/UL (ref 2–7.15)
NEUTROPHILS NFR BLD: 74.7 % (ref 44–72)
NRBC # BLD AUTO: 0.11 K/UL
NRBC BLD AUTO-RTO: 0.6 /100 WBC
PLATELET # BLD AUTO: 293 K/UL (ref 164–446)
PMV BLD AUTO: 10.8 FL (ref 9–12.9)
POTASSIUM SERPL-SCNC: 3.7 MMOL/L (ref 3.6–5.5)
POTASSIUM SERPL-SCNC: 3.9 MMOL/L (ref 3.6–5.5)
PROT SERPL-MCNC: 7.2 G/DL (ref 6–8.2)
RBC # BLD AUTO: 3.42 M/UL (ref 4.2–5.4)
SIGNIFICANT IND 70042: NORMAL
SIGNIFICANT IND 70042: NORMAL
SITE SITE: NORMAL
SITE SITE: NORMAL
SODIUM SERPL-SCNC: 137 MMOL/L (ref 135–145)
SODIUM SERPL-SCNC: 139 MMOL/L (ref 135–145)
SOURCE SOURCE: NORMAL
SOURCE SOURCE: NORMAL
WBC # BLD AUTO: 17.3 K/UL (ref 4.8–10.8)

## 2017-06-04 PROCEDURE — 94760 N-INVAS EAR/PLS OXIMETRY 1: CPT

## 2017-06-04 PROCEDURE — 770020 HCHG ROOM/CARE - TELE (206)

## 2017-06-04 PROCEDURE — 93306 TTE W/DOPPLER COMPLETE: CPT

## 2017-06-04 PROCEDURE — A9270 NON-COVERED ITEM OR SERVICE: HCPCS | Performed by: INTERNAL MEDICINE

## 2017-06-04 PROCEDURE — 700111 HCHG RX REV CODE 636 W/ 250 OVERRIDE (IP): Performed by: INTERNAL MEDICINE

## 2017-06-04 PROCEDURE — 85025 COMPLETE CBC W/AUTO DIFF WBC: CPT

## 2017-06-04 PROCEDURE — 36415 COLL VENOUS BLD VENIPUNCTURE: CPT

## 2017-06-04 PROCEDURE — 94640 AIRWAY INHALATION TREATMENT: CPT

## 2017-06-04 PROCEDURE — 700101 HCHG RX REV CODE 250: Performed by: INTERNAL MEDICINE

## 2017-06-04 PROCEDURE — 700102 HCHG RX REV CODE 250 W/ 637 OVERRIDE(OP): Performed by: INTERNAL MEDICINE

## 2017-06-04 PROCEDURE — 83735 ASSAY OF MAGNESIUM: CPT

## 2017-06-04 PROCEDURE — 99233 SBSQ HOSP IP/OBS HIGH 50: CPT | Performed by: INTERNAL MEDICINE

## 2017-06-04 PROCEDURE — 80053 COMPREHEN METABOLIC PANEL: CPT

## 2017-06-04 PROCEDURE — 71010 DX-CHEST-LIMITED (1 VIEW): CPT

## 2017-06-04 PROCEDURE — 82962 GLUCOSE BLOOD TEST: CPT

## 2017-06-04 PROCEDURE — 700105 HCHG RX REV CODE 258: Performed by: INTERNAL MEDICINE

## 2017-06-04 PROCEDURE — 93306 TTE W/DOPPLER COMPLETE: CPT | Mod: 26 | Performed by: INTERNAL MEDICINE

## 2017-06-04 PROCEDURE — 80048 BASIC METABOLIC PNL TOTAL CA: CPT

## 2017-06-04 RX ORDER — FUROSEMIDE 10 MG/ML
40 INJECTION INTRAMUSCULAR; INTRAVENOUS ONCE
Status: COMPLETED | OUTPATIENT
Start: 2017-06-04 | End: 2017-06-04

## 2017-06-04 RX ORDER — MAGNESIUM SULFATE HEPTAHYDRATE 40 MG/ML
2 INJECTION, SOLUTION INTRAVENOUS ONCE
Status: COMPLETED | OUTPATIENT
Start: 2017-06-04 | End: 2017-06-04

## 2017-06-04 RX ORDER — POTASSIUM CHLORIDE 20 MEQ/1
20 TABLET, EXTENDED RELEASE ORAL ONCE
Status: COMPLETED | OUTPATIENT
Start: 2017-06-04 | End: 2017-06-04

## 2017-06-04 RX ADMIN — Medication 400 MG: at 08:05

## 2017-06-04 RX ADMIN — OXYCODONE HYDROCHLORIDE 5 MG: 5 TABLET ORAL at 20:23

## 2017-06-04 RX ADMIN — ALBUTEROL SULFATE 2.5 MG: 2.5 SOLUTION RESPIRATORY (INHALATION) at 03:22

## 2017-06-04 RX ADMIN — Medication 400 MG: at 20:22

## 2017-06-04 RX ADMIN — FERROUS SULFATE TAB 325 MG (65 MG ELEMENTAL FE) 325 MG: 325 (65 FE) TAB at 08:05

## 2017-06-04 RX ADMIN — ONDANSETRON 4 MG: 2 INJECTION INTRAMUSCULAR; INTRAVENOUS at 04:32

## 2017-06-04 RX ADMIN — ENOXAPARIN SODIUM 40 MG: 100 INJECTION SUBCUTANEOUS at 08:06

## 2017-06-04 RX ADMIN — CEFTRIAXONE 2 G: 2 INJECTION, POWDER, FOR SOLUTION INTRAMUSCULAR; INTRAVENOUS at 08:14

## 2017-06-04 RX ADMIN — OXYCODONE HYDROCHLORIDE 5 MG: 5 TABLET ORAL at 08:05

## 2017-06-04 RX ADMIN — DOCUSATE SODIUM AND SENNOSIDES 2 TABLET: 8.6; 5 TABLET, FILM COATED ORAL at 20:26

## 2017-06-04 RX ADMIN — AZITHROMYCIN 250 MG: 250 TABLET, FILM COATED ORAL at 08:05

## 2017-06-04 RX ADMIN — FERROUS SULFATE TAB 325 MG (65 MG ELEMENTAL FE) 325 MG: 325 (65 FE) TAB at 11:37

## 2017-06-04 RX ADMIN — OXYCODONE HYDROCHLORIDE 5 MG: 5 TABLET ORAL at 12:09

## 2017-06-04 RX ADMIN — FUROSEMIDE 40 MG: 10 INJECTION, SOLUTION INTRAMUSCULAR; INTRAVENOUS at 07:47

## 2017-06-04 RX ADMIN — OXYCODONE HYDROCHLORIDE 5 MG: 5 TABLET ORAL at 03:47

## 2017-06-04 RX ADMIN — FERROUS SULFATE TAB 325 MG (65 MG ELEMENTAL FE) 325 MG: 325 (65 FE) TAB at 17:24

## 2017-06-04 RX ADMIN — LACTOBACILLUS ACIDOPHILUS / LACTOBACILLUS BULGARICUS 1 PACKET: 100 MILLION CFU STRENGTH GRANULES at 08:06

## 2017-06-04 RX ADMIN — POTASSIUM CHLORIDE 20 MEQ: 1500 TABLET, EXTENDED RELEASE ORAL at 13:52

## 2017-06-04 RX ADMIN — FUROSEMIDE 40 MG: 10 INJECTION, SOLUTION INTRAMUSCULAR; INTRAVENOUS at 11:37

## 2017-06-04 RX ADMIN — MAGNESIUM SULFATE IN WATER 2 G: 40 INJECTION, SOLUTION INTRAVENOUS at 13:52

## 2017-06-04 ASSESSMENT — LIFESTYLE VARIABLES: SUBSTANCE_ABUSE: 0

## 2017-06-04 ASSESSMENT — ENCOUNTER SYMPTOMS
DIZZINESS: 0
VOMITING: 0
FEVER: 0
WEAKNESS: 0
BACK PAIN: 0
PND: 0
SEIZURES: 0
ORTHOPNEA: 0
CHILLS: 0
SPEECH CHANGE: 0
EYE PAIN: 0
BLURRED VISION: 0
HEMOPTYSIS: 0
CONSTIPATION: 0
HEARTBURN: 0
WEIGHT LOSS: 0
SPUTUM PRODUCTION: 0
WHEEZING: 0
NAUSEA: 0
FOCAL WEAKNESS: 0
NECK PAIN: 0
TREMORS: 0
DEPRESSION: 0
COUGH: 1
ABDOMINAL PAIN: 0
FALLS: 0
PALPITATIONS: 0
HEADACHES: 0
SHORTNESS OF BREATH: 1
DIARRHEA: 0

## 2017-06-04 ASSESSMENT — PAIN SCALES - GENERAL
PAINLEVEL_OUTOF10: 5
PAINLEVEL_OUTOF10: 3
PAINLEVEL_OUTOF10: 4
PAINLEVEL_OUTOF10: 6
PAINLEVEL_OUTOF10: 7
PAINLEVEL_OUTOF10: 5
PAINLEVEL_OUTOF10: 4
PAINLEVEL_OUTOF10: 0

## 2017-06-04 NOTE — PROGRESS NOTES
Pt continues to get up to commode by self with no difficulty. Oxy mask remains on at 6 lpm. Pt requesting door to be closed at this time with minimal interruptions.

## 2017-06-04 NOTE — PROGRESS NOTES
Pt given x1 more dose lasix per MD order. Given pain medication per request and heat pack. No further needs at this time. Will call RN or CNA if needs arise.

## 2017-06-04 NOTE — FLOWSHEET NOTE
06/04/17 0320   Events/Summary/Plan   Events/Summary/Plan Post treatment pt subjectively feels better   Non-Invasive Resp Device Site Inspection Completed Intact   Interdisciplinary Plan of Care-Goals (Indications)   Obstructive Ventilatory Defect or Pulmonary Disease without Obvious Obstruction Strong Subjective / Objective Improvement;PFT / Reduced Airflow   Interdisciplinary Plan of Care-Outcomes    Bronchodilator Outcome Improvement in Airflow (peak flow, PFT)   Education   Education Yes - Pt. / Family has been Instructed in use of Respiratory Equipment;Yes - Pt. / Family has been Instructed in use of Respiratory Medications and Adverse Reactions   RT Assessment of Delivered Medications   Evaluation of Medication Delivery Daily Yes-- Pt /Family has been Instructed in use of Respiratory Medications and Adverse Reactions   SVN Group   #SVN Performed Yes   Given By: Mouthpiece   Respiratory WDL   Respiratory (WDL) X   Chest Exam   Work Of Breathing / Effort Mild   Respiration 16   Pulse 84   Breath Sounds   Pre/Post Intervention Pre Intervention Assessment   RUL Breath Sounds Diminished   RML Breath Sounds Diminished;Fine Crackles   RLL Breath Sounds Diminished;Fine Crackles   TENZIN Breath Sounds Diminished   LLL Breath Sounds Diminished;Fine Crackles   Secretions   Cough Non Productive   Oximetry   #Pulse Oximetry (Single Determination) Yes   Oxygen   Pulse Oximetry 97 %   O2 (LPM) 4   O2 Daily Delivery Respiratory  Silicone Nasal Cannula

## 2017-06-04 NOTE — CARE PLAN
Problem: Communication  Goal: The ability to communicate needs accurately and effectively will improve  Outcome: PROGRESSING AS EXPECTED  Communication will remain effective with patient. Pt updated on POC as changes made/initiated. Encouraged to inform RN if any needs arise.     Problem: Respiratory:  Goal: Respiratory status will improve  Outcome: PROGRESSING SLOWER THAN EXPECTED  Pt's oxygen needs titrated per O2 levels. Given lasix this AM for fluid overload and another dose will be given later this AM. Continuous pulse ox in place.

## 2017-06-04 NOTE — PROGRESS NOTES
6/3/17, 1920- New IV placed to left hand as other two previously noted IV's nonfunctioning at this time.

## 2017-06-04 NOTE — PROGRESS NOTES
Tele Strip Analysis:     Pt is in normal sinus rhythm with HR 82, 0.18/0.08/0.36. Rare PVC ectopy.

## 2017-06-04 NOTE — CARE PLAN
Problem: Infection  Goal: Will remain free from infection  Outcome: PROGRESSING AS EXPECTED  Intervention: Implement standard precautions and perform hand washing before and after patient contact  Hand washing every encounter. IV ports scrubbed with alcohol when hanging medicine. Patient watch for s/s of infection. Patient taucht to report s/s of infection, verbalizes understanding.      Problem: Pain Management  Goal: Pain level will decrease to patient’s comfort goal  Outcome: PROGRESSING AS EXPECTED  Intervention: Follow pain managment plan developed in collaboration with patient and Interdisciplinary Team  Pain assessment q4h or q2h after medication intervention.  Encourage patient to report pain, verbalize understanding. Medicate PRN

## 2017-06-04 NOTE — ASSESSMENT & PLAN NOTE
Significantly improved after iv lasix  Less SOB today  Fluid overload sign  CXR showed possible pulm edema  Repeat iv lasix 40mg x 1 today, can start some po lasix tmr  Monitor closely in tele with O2

## 2017-06-04 NOTE — RESPIRATORY CARE
Respiratory Note: Interval Hx      03:22 called to pt room for pt complaining of SOB and chest tightness. On assessment pt was found to be in 4 lpm with O2 sats 94%with decresed breath sound with fine crackles. SVN given per pt request, pt subjectively feels better post tx pt placed back on o2.    0350 called to pt room RN notified this RT that pt is again feeling SOB with desaturation into the mid 80's, on arrival pt appears to have increased WOB. Pt was sat upright to a 90 degree angle, oxymask placed on pt O2 increased to 6 lpm. Pt sats improved. Pt then tried to lay back down and she felt SOB again and her sats fell into the mid 80's. Pt also reports decreased urine output. No visible swelling on extremities was noted. Pt is noted to have iv fluids still being given at 125 ml/hr and fine crackles in all lung fields fabrizio bases and middle. Rt recommendation for cxr and RN paged MD and orders received.    05:10 Pt now placed on 10 lpm oxymask due to worsening hypoxia after slight activity will continue to monitor

## 2017-06-04 NOTE — PROGRESS NOTES
"0715-Report from NOC RN. POC reviewed. Pt in bed on 8L oxymask and noted to have difficulty with breathing. Upon assessment pt lung sounds crackles. Ernestine, hospitalist RN notified and to notify Dr. Khan.     7739-Dr. Khan down to see patient. Stat order for lasix received and given. Bedside commode placed at bedside. Pt educated on effect of lasix. Instructed to call when getting up. Pt verbalizes understanding.     0549-Pt reports \"feeling better\" and less shortness of breathing. Voiding adequately. Remains 6-8 liters on the oxymask. Monitor tech notified RN of increased ectopy (runs of vtach) via tele monitor. Dr. Khan notified.   "

## 2017-06-04 NOTE — PROGRESS NOTES
Hospital Medicine Interval Note  Date of Service:  6/4/2017    Chief Complaint  46 y.o.-year-old female admitted 6/2/2017 with PNA and sepsis    Interval Problem Update  6/3 feeling still SOB and some R chest pain. Patient's pain is local, 4-6/10, intermittent and does not radiate to other location, sharp and with some tingling. Can be controlled by pain meds. BP boarderline.    6/4 worsening of SOB this morning and signs of acute pulm edema. No CP. No fever, some wheezing and cough. Was treated with iv lasix x 1 stat with some improvement.    Consultants/Specialty  none    Disposition  Home when stable     Review of Systems   Constitutional: Negative for fever, chills and weight loss.   HENT: Negative for congestion, ear discharge, ear pain, hearing loss and nosebleeds.    Eyes: Negative for blurred vision and pain.   Respiratory: Positive for cough and shortness of breath. Negative for hemoptysis, sputum production and wheezing.    Cardiovascular: Negative for chest pain, palpitations, orthopnea, leg swelling and PND.   Gastrointestinal: Negative for heartburn, nausea, vomiting, abdominal pain, diarrhea and constipation.   Genitourinary: Negative for dysuria, urgency, frequency and hematuria.   Musculoskeletal: Negative for back pain, joint pain, falls and neck pain.   Skin: Negative for rash.   Neurological: Negative for dizziness, tremors, speech change, focal weakness, seizures, weakness and headaches.   Psychiatric/Behavioral: Negative for depression, suicidal ideas and substance abuse.      Physical Exam Laboratory/Imaging   Filed Vitals:    06/03/17 2354 06/04/17 0320 06/04/17 0402 06/04/17 0710   BP: 142/73  125/98    Pulse: 81 84 90 90   Temp: 36.9 °C (98.4 °F)  37.1 °C (98.7 °F)    Resp: 18 16 16 22   Height:       Weight:       SpO2: 96% 97% 96% 98%     Physical Exam   Constitutional: She is oriented to person, place, and time. She appears well-developed and well-nourished.   HENT:   Head: Normocephalic  and atraumatic.   Nose: Nose normal.   Mouth/Throat: No oropharyngeal exudate.   Eyes: Conjunctivae and EOM are normal. Pupils are equal, round, and reactive to light.   Neck: Normal range of motion. Neck supple. No JVD present. No thyromegaly present.   Cardiovascular: Normal rate, regular rhythm and normal heart sounds.  Exam reveals no gallop and no friction rub.    No murmur heard.  Pulmonary/Chest: She is in respiratory distress. She has wheezes. She has rales. She exhibits no tenderness.   Abdominal: Soft. Bowel sounds are normal. She exhibits no distension and no mass. There is no tenderness. There is no rebound and no guarding.   Musculoskeletal: Normal range of motion. She exhibits no edema or tenderness.   Lymphadenopathy:     She has no cervical adenopathy.   Neurological: She is alert and oriented to person, place, and time. No cranial nerve deficit.   Skin: Skin is warm. No rash noted.   Psychiatric: Her behavior is normal.    Lab Results   Component Value Date/Time    WBC 17.3* 06/04/2017 05:31 AM    HEMOGLOBIN 7.1* 06/04/2017 05:31 AM    HEMATOCRIT 24.5* 06/04/2017 05:31 AM    PLATELET COUNT 293 06/04/2017 05:31 AM     Lab Results   Component Value Date/Time    SODIUM 139 06/04/2017 05:31 AM    POTASSIUM 3.9 06/04/2017 05:31 AM    CHLORIDE 108 06/04/2017 05:31 AM    CO2 23 06/04/2017 05:31 AM    GLUCOSE 121* 06/04/2017 05:31 AM    BUN 6* 06/04/2017 05:31 AM    CREATININE 0.64 06/04/2017 05:31 AM      Assessment/Plan    * Acute pulmonary edema (CMS-[facility])  Assessment & Plan  Mor SOB today  Fluid overload sign  CXR showed possible pulm edema  Iv lasix 40mg x 1 in early morning and then repeat x 1  Monitor closely in tele with O2      Acute respiratory failure (CMS-HCC)  Assessment & Plan  o2 rt and BD  Treat PNA and pulm edema  See above    Pneumonia, community acquired  Assessment & Plan  Sob and sputum  Leucocytosis  CX L lung PNA  Cont iv rocephin and azithromycin  RT O2 BD  DC steroid since no  wheezing    Obesity  Assessment & Plan  eduction    Leucocytosis  Assessment & Plan  From infection and steroid      Anemia  Assessment & Plan  Hgb 8->7->7.1  Possible dilution  Cont to monitor  FOBT (-)  Iron low  Added po iron  Possible from heavy mens     Hypokalemia  Assessment & Plan  K 2.9-5.1 resolved  Cont to monitor    Hyperglycemia  Assessment & Plan  2/2 stress and steriod  Cont to monitor    Hyponatremia  Assessment & Plan  resolved    Smoking  Assessment & Plan  Education provided      Sepsis (CMS-[facility])  Assessment & Plan  2/2 PNA  Leucocytosis, elevated lactic acid  Cont ivf and iv abx  See above  Culture pending       Labs reviewed, Radiology images reviewed and Medications reviewed  Anderson catheter: No Anderson        DVT prophylaxis - mechanical: SCDs  Ulcer prophylaxis: Not indicated  Antibiotics: Treating active infection/contamination beyond 24 hours perioperative coverage  Assessed for rehab: Patient returned to prior level of function, rehabilitation not indicated at this time      For complexity-based billing, please refer to the history, exam, and decison making above. In addition, I spent 35 minutes caring for the patient today. More than 50% of the time was spent counseling and coordinating care.    I have discussed with RN and CRISTEL and SW and other consultants about patient's plan.

## 2017-06-04 NOTE — PROGRESS NOTES
Notified Rebeka Bolden, Hospitalist, that patient diagnosed with CAP and acute respiratory failure. Patient received breathing treatment and still  had shortness of breath, O2 level at 88% with 3.5 L NC.  O2 was bumped up to 4L NC and patient still at 88% O2 level.  Geena, RT, came and placed patient on a mask and increased O2 to 6L and had patient sitting up. Patient's O2 level increased to 95%. Patient's O2 sat would decrease if she laid back down.  Notified Rebeka Bolden, also that patient is still receiving NS at 125ml/hr and patient has not been voiding that much.  Patient has crackles in her lung sound.  New orders received from Rebeka Bolden to have patient sitting up and with the mask on.  Rebeka Bolden, APRN, said she would put in an order for patient to have chest x-ray.

## 2017-06-05 PROBLEM — E87.20 LACTIC ACIDOSIS: Status: ACTIVE | Noted: 2017-06-05

## 2017-06-05 PROBLEM — I50.31 ACUTE DIASTOLIC CHF (CONGESTIVE HEART FAILURE) (HCC): Status: ACTIVE | Noted: 2017-06-05

## 2017-06-05 LAB
ANION GAP SERPL CALC-SCNC: 9 MMOL/L (ref 0–11.9)
BASOPHILS # BLD AUTO: 0.3 % (ref 0–1.8)
BASOPHILS # BLD: 0.04 K/UL (ref 0–0.12)
BUN SERPL-MCNC: <5 MG/DL (ref 8–22)
CALCIUM SERPL-MCNC: 7.8 MG/DL (ref 8.4–10.2)
CHLORIDE SERPL-SCNC: 98 MMOL/L (ref 96–112)
CO2 SERPL-SCNC: 28 MMOL/L (ref 20–33)
CREAT SERPL-MCNC: 0.73 MG/DL (ref 0.5–1.4)
EOSINOPHIL # BLD AUTO: 0.41 K/UL (ref 0–0.51)
EOSINOPHIL NFR BLD: 3.5 % (ref 0–6.9)
ERYTHROCYTE [DISTWIDTH] IN BLOOD BY AUTOMATED COUNT: 54.3 FL (ref 35.9–50)
GFR SERPL CREATININE-BSD FRML MDRD: >60 ML/MIN/1.73 M 2
GLUCOSE BLD-MCNC: 113 MG/DL (ref 65–99)
GLUCOSE BLD-MCNC: 116 MG/DL (ref 65–99)
GLUCOSE BLD-MCNC: 127 MG/DL (ref 65–99)
GLUCOSE BLD-MCNC: 139 MG/DL (ref 65–99)
GLUCOSE SERPL-MCNC: 115 MG/DL (ref 65–99)
HCT VFR BLD AUTO: 25.2 % (ref 37–47)
HGB BLD-MCNC: 7.4 G/DL (ref 12–16)
IMM GRANULOCYTES # BLD AUTO: 0.08 K/UL (ref 0–0.11)
IMM GRANULOCYTES NFR BLD AUTO: 0.7 % (ref 0–0.9)
LACTATE BLD-SCNC: 1.38 MMOL/L (ref 0.5–2)
LYMPHOCYTES # BLD AUTO: 3.15 K/UL (ref 1–4.8)
LYMPHOCYTES NFR BLD: 27.2 % (ref 22–41)
MCH RBC QN AUTO: 21 PG (ref 27–33)
MCHC RBC AUTO-ENTMCNC: 29.4 G/DL (ref 33.6–35)
MCV RBC AUTO: 71.4 FL (ref 81.4–97.8)
MONOCYTES # BLD AUTO: 0.88 K/UL (ref 0–0.85)
MONOCYTES NFR BLD AUTO: 7.6 % (ref 0–13.4)
NEUTROPHILS # BLD AUTO: 7.01 K/UL (ref 2–7.15)
NEUTROPHILS NFR BLD: 60.7 % (ref 44–72)
NRBC # BLD AUTO: 0.08 K/UL
NRBC BLD AUTO-RTO: 0.7 /100 WBC
PLATELET # BLD AUTO: 287 K/UL (ref 164–446)
PMV BLD AUTO: 10.2 FL (ref 9–12.9)
POTASSIUM SERPL-SCNC: 3.8 MMOL/L (ref 3.6–5.5)
RBC # BLD AUTO: 3.53 M/UL (ref 4.2–5.4)
SODIUM SERPL-SCNC: 135 MMOL/L (ref 135–145)
SPECIMEN DRAWN FROM PATIENT: NORMAL
WBC # BLD AUTO: 11.6 K/UL (ref 4.8–10.8)

## 2017-06-05 PROCEDURE — 700102 HCHG RX REV CODE 250 W/ 637 OVERRIDE(OP): Performed by: INTERNAL MEDICINE

## 2017-06-05 PROCEDURE — A9270 NON-COVERED ITEM OR SERVICE: HCPCS | Performed by: NURSE PRACTITIONER

## 2017-06-05 PROCEDURE — 36415 COLL VENOUS BLD VENIPUNCTURE: CPT

## 2017-06-05 PROCEDURE — 82962 GLUCOSE BLOOD TEST: CPT | Mod: 91

## 2017-06-05 PROCEDURE — 770020 HCHG ROOM/CARE - TELE (206)

## 2017-06-05 PROCEDURE — 700105 HCHG RX REV CODE 258: Performed by: INTERNAL MEDICINE

## 2017-06-05 PROCEDURE — 85025 COMPLETE CBC W/AUTO DIFF WBC: CPT

## 2017-06-05 PROCEDURE — 700111 HCHG RX REV CODE 636 W/ 250 OVERRIDE (IP): Performed by: INTERNAL MEDICINE

## 2017-06-05 PROCEDURE — 83605 ASSAY OF LACTIC ACID: CPT

## 2017-06-05 PROCEDURE — A9270 NON-COVERED ITEM OR SERVICE: HCPCS | Performed by: INTERNAL MEDICINE

## 2017-06-05 PROCEDURE — 700102 HCHG RX REV CODE 250 W/ 637 OVERRIDE(OP): Performed by: NURSE PRACTITIONER

## 2017-06-05 PROCEDURE — 94760 N-INVAS EAR/PLS OXIMETRY 1: CPT

## 2017-06-05 PROCEDURE — 99232 SBSQ HOSP IP/OBS MODERATE 35: CPT | Performed by: INTERNAL MEDICINE

## 2017-06-05 PROCEDURE — 80048 BASIC METABOLIC PNL TOTAL CA: CPT

## 2017-06-05 RX ORDER — ACETAMINOPHEN 325 MG/1
650 TABLET ORAL EVERY 4 HOURS PRN
Status: DISCONTINUED | OUTPATIENT
Start: 2017-06-05 | End: 2017-06-08 | Stop reason: HOSPADM

## 2017-06-05 RX ORDER — METOPROLOL SUCCINATE 25 MG/1
25 TABLET, EXTENDED RELEASE ORAL
Status: DISCONTINUED | OUTPATIENT
Start: 2017-06-05 | End: 2017-06-08 | Stop reason: HOSPADM

## 2017-06-05 RX ORDER — FUROSEMIDE 10 MG/ML
40 INJECTION INTRAMUSCULAR; INTRAVENOUS ONCE
Status: COMPLETED | OUTPATIENT
Start: 2017-06-05 | End: 2017-06-05

## 2017-06-05 RX ADMIN — METOPROLOL SUCCINATE 25 MG: 25 TABLET, EXTENDED RELEASE ORAL at 11:00

## 2017-06-05 RX ADMIN — LACTOBACILLUS ACIDOPHILUS / LACTOBACILLUS BULGARICUS 1 PACKET: 100 MILLION CFU STRENGTH GRANULES at 08:20

## 2017-06-05 RX ADMIN — ENOXAPARIN SODIUM 40 MG: 100 INJECTION SUBCUTANEOUS at 08:20

## 2017-06-05 RX ADMIN — FERROUS SULFATE TAB 325 MG (65 MG ELEMENTAL FE) 325 MG: 325 (65 FE) TAB at 12:12

## 2017-06-05 RX ADMIN — DOCUSATE SODIUM AND SENNOSIDES 2 TABLET: 8.6; 5 TABLET, FILM COATED ORAL at 20:55

## 2017-06-05 RX ADMIN — FERROUS SULFATE TAB 325 MG (65 MG ELEMENTAL FE) 325 MG: 325 (65 FE) TAB at 08:20

## 2017-06-05 RX ADMIN — OXYCODONE HYDROCHLORIDE 5 MG: 5 TABLET ORAL at 19:01

## 2017-06-05 RX ADMIN — FERROUS SULFATE TAB 325 MG (65 MG ELEMENTAL FE) 325 MG: 325 (65 FE) TAB at 17:20

## 2017-06-05 RX ADMIN — Medication 400 MG: at 20:55

## 2017-06-05 RX ADMIN — ACETAMINOPHEN 650 MG: 325 TABLET, FILM COATED ORAL at 17:19

## 2017-06-05 RX ADMIN — LACTOBACILLUS ACIDOPHILUS / LACTOBACILLUS BULGARICUS 1 PACKET: 100 MILLION CFU STRENGTH GRANULES at 12:12

## 2017-06-05 RX ADMIN — ACETAMINOPHEN 650 MG: 325 TABLET, FILM COATED ORAL at 11:00

## 2017-06-05 RX ADMIN — CEFTRIAXONE 2 G: 2 INJECTION, POWDER, FOR SOLUTION INTRAMUSCULAR; INTRAVENOUS at 08:25

## 2017-06-05 RX ADMIN — FUROSEMIDE 40 MG: 10 INJECTION, SOLUTION INTRAMUSCULAR; INTRAVENOUS at 10:46

## 2017-06-05 RX ADMIN — ACETAMINOPHEN 650 MG: 325 TABLET, FILM COATED ORAL at 05:24

## 2017-06-05 RX ADMIN — LACTOBACILLUS ACIDOPHILUS / LACTOBACILLUS BULGARICUS 1 PACKET: 100 MILLION CFU STRENGTH GRANULES at 17:19

## 2017-06-05 RX ADMIN — DOCUSATE SODIUM AND SENNOSIDES 2 TABLET: 8.6; 5 TABLET, FILM COATED ORAL at 08:20

## 2017-06-05 RX ADMIN — Medication 400 MG: at 08:20

## 2017-06-05 RX ADMIN — OXYCODONE HYDROCHLORIDE 5 MG: 5 TABLET ORAL at 02:34

## 2017-06-05 RX ADMIN — AZITHROMYCIN 250 MG: 250 TABLET, FILM COATED ORAL at 08:20

## 2017-06-05 ASSESSMENT — ENCOUNTER SYMPTOMS
CONSTIPATION: 0
FOCAL WEAKNESS: 0
PND: 0
BLURRED VISION: 0
NECK PAIN: 0
COUGH: 1
DEPRESSION: 0
ABDOMINAL PAIN: 0
SPEECH CHANGE: 0
WEAKNESS: 0
HEADACHES: 0
HEARTBURN: 0
FALLS: 0
HEMOPTYSIS: 0
EYE PAIN: 0
DIZZINESS: 0
FEVER: 0
NAUSEA: 0
ORTHOPNEA: 0
SHORTNESS OF BREATH: 1
WHEEZING: 0
SPUTUM PRODUCTION: 0
TREMORS: 0
SEIZURES: 0
DIARRHEA: 0
WEIGHT LOSS: 0

## 2017-06-05 ASSESSMENT — PAIN SCALES - GENERAL
PAINLEVEL_OUTOF10: 2
PAINLEVEL_OUTOF10: 5

## 2017-06-05 ASSESSMENT — LIFESTYLE VARIABLES: SUBSTANCE_ABUSE: 0

## 2017-06-05 NOTE — DISCHARGE PLANNING
Patient discussed in morning rounds.  Patient reportedly lives at home with her spouse and plans to return home when medically able. No current SS needs noted.

## 2017-06-05 NOTE — CARE PLAN
Problem: Infection  Goal: Will remain free from infection  Outcome: PROGRESSING AS EXPECTED  Still on po and IV abx. Sputum remains thick and tan.    Problem: Respiratory:  Goal: Respiratory status will improve  Outcome: PROGRESSING SLOWER THAN EXPECTED  Still requires 6 liters of oxygen per nasal cannula to maintain oxygen level above 90 percent.

## 2017-06-05 NOTE — PROGRESS NOTES
Telemetry Summary    Rhythm Interpretation: Sinus Rhythm rare PVC's   Heart Rate: 90  ID Interval: 0.18  QRS Interval: 0.08  QT Interval: 0.34

## 2017-06-05 NOTE — FLOWSHEET NOTE
06/04/17 1909   Events/Summary/Plan   Events/Summary/Plan Pt asleep at this time with no apperant respiratory distress at this itme. pt noted to be tachypnic at this time . RN notified to call RT if pt in distress    Non-Invasive Resp Device Site Inspection Completed Intact   Therapy Not Performed   Type of Therapy Not Performed SVN   Reason Therapy Not Performed PRN, No Indication   Chest Exam   Respiration 20   Pulse 90   Oximetry   #Pulse Oximetry (Single Determination) Yes   Oxygen   Pulse Oximetry 98 %   O2 (LPM) 6   O2 Daily Delivery Respiratory  OxyMask

## 2017-06-05 NOTE — PROGRESS NOTES
Tele Note  Rhythm: sinus/tachycardia rhythm  HR: 90  Ectopy: frequent PVCs, including runs of vtach, couplets, and triplets  NC: 0.16  QRS: 0.08  QT: 0.32

## 2017-06-05 NOTE — PROGRESS NOTES
Hospital Medicine Interval Note  Date of Service:  6/5/2017    Chief Complaint  46 y.o.-year-old female admitted 6/2/2017 with PNA and sepsis    Interval Problem Update  6/3 feeling still SOB and some R chest pain. Patient's pain is local, 4-6/10, intermittent and does not radiate to other location, sharp and with some tingling. Can be controlled by pain meds. BP boarderline.    6/4 worsening of SOB this morning and signs of acute pulm edema. No CP. No fever, some wheezing and cough. Was treated with iv lasix x 1 stat with some improvement.    6/5 less shortness of breath this morning, still feels very tired, bilateral lower extremity mild edema, also has vaginal treatment, clinically still looks like overloaded.    Consultants/Specialty  none    Disposition  Home when stable     Review of Systems   Constitutional: Negative for fever and weight loss.   HENT: Negative for congestion, ear discharge, ear pain and hearing loss.    Eyes: Negative for blurred vision and pain.   Respiratory: Positive for cough and shortness of breath. Negative for hemoptysis, sputum production and wheezing.    Cardiovascular: Negative for chest pain, orthopnea, leg swelling and PND.   Gastrointestinal: Negative for heartburn, nausea, abdominal pain, diarrhea and constipation.   Genitourinary: Negative for dysuria, urgency, frequency and hematuria.   Musculoskeletal: Negative for joint pain, falls and neck pain.   Skin: Negative for rash.   Neurological: Negative for dizziness, tremors, speech change, focal weakness, seizures, weakness and headaches.   Psychiatric/Behavioral: Negative for depression, suicidal ideas and substance abuse.      Physical Exam Laboratory/Imaging   Filed Vitals:    06/05/17 0200 06/05/17 0500 06/05/17 0646 06/05/17 0700   BP:  127/66  134/71   Pulse:  88  86   Temp: 37.6 °C (99.7 °F) 37.8 °C (100.1 °F) 37.6 °C (99.7 °F) 37.2 °C (99 °F)   Resp:  18  18   Height:       Weight:       SpO2:  100%  95%     Physical  Exam   Constitutional: She is oriented to person, place, and time. No distress.   HENT:   Head: Normocephalic.   Nose: Nose normal.   Mouth/Throat: No oropharyngeal exudate.   Eyes: EOM are normal. Pupils are equal, round, and reactive to light.   Neck: Normal range of motion. Neck supple. No JVD present. No thyromegaly present.   Cardiovascular: Normal rate, regular rhythm and normal heart sounds.  Exam reveals no gallop and no friction rub.    No murmur heard.  Pulmonary/Chest: Effort normal and breath sounds normal. No respiratory distress. She has no rales. She exhibits no tenderness.   Abdominal: Soft. Bowel sounds are normal. She exhibits no distension and no mass. There is no rebound and no guarding.   Musculoskeletal: Normal range of motion. She exhibits edema (trace). She exhibits no tenderness.   Lymphadenopathy:     She has no cervical adenopathy.   Neurological: She is alert and oriented to person, place, and time. No cranial nerve deficit.   Skin: Skin is warm. No rash noted. She is not diaphoretic.   Psychiatric: Her behavior is normal.    Lab Results   Component Value Date/Time    WBC 11.6* 06/05/2017 05:53 AM    HEMOGLOBIN 7.4* 06/05/2017 05:53 AM    HEMATOCRIT 25.2* 06/05/2017 05:53 AM    PLATELET COUNT 287 06/05/2017 05:53 AM     Lab Results   Component Value Date/Time    SODIUM 135 06/05/2017 05:53 AM    POTASSIUM 3.8 06/05/2017 05:53 AM    CHLORIDE 98 06/05/2017 05:53 AM    CO2 28 06/05/2017 05:53 AM    GLUCOSE 115* 06/05/2017 05:53 AM    BUN <5* 06/05/2017 05:53 AM    CREATININE 0.73 06/05/2017 05:53 AM      Assessment/Plan    * Acute pulmonary edema (CMS-[facility])  Assessment & Plan  Significantly improved after iv lasix  Less SOB today  Fluid overload sign  CXR showed possible pulm edema  Repeat iv lasix 40mg x 1 today, can start some po lasix tmr  Monitor closely in tele with O2      Acute respiratory failure (CMS-HCC)  Assessment & Plan  o2 rt and BD  Treat PNA and pulm edema  See  above    Pneumonia, community acquired  Assessment & Plan  Sob and sputum  Leucocytosis  CX L lung PNA  Cont iv rocephin and azithromycin  RT O2 BD  DC steroid since no wheezing    Obesity  Assessment & Plan  eduction    Leucocytosis  Assessment & Plan  From infection and steroid      Anemia  Assessment & Plan  Hgb 8->7->7.1->7.4  Possible dilution  Cont to monitor  FOBT (-)  Iron low  Added po iron  Possible from heavy mens     Hypokalemia  Assessment & Plan  K 2.9-5.1->3.8 resolved  Cont to monitor    Hyperglycemia  Assessment & Plan  2/2 stress and steriod  Cont to monitor    Hyponatremia  Assessment & Plan  resolved    Smoking  Assessment & Plan  Education provided      Sepsis (CMS-HCC)  Assessment & Plan  2/2 PNA  Leucocytosis, elevated lactic acid improved  Cont iv abx  See above  Culture pending    Acute diastolic CHF (congestive heart failure) (CMS-HCC)  Assessment & Plan  Patient's echo showing ejection fraction within normal limits  Stage II diastolic dysfunction from echo patient does have history of coronary doses, questioning about cardiac involvement  Start patient on beta blocker, diuretics and continue to monitor  If patient condition not significantly improved on the above treatment, patient will need inpatient cardiology consult otherwise patient can see PCP and cardiologist as outpatient.      Lactic acidosis  Assessment & Plan  currently resolved  Repeat lactic acid 1.38       Labs reviewed, Radiology images reviewed and Medications reviewed  Anderson catheter: No Anderson        DVT prophylaxis - mechanical: SCDs  Ulcer prophylaxis: Not indicated  Antibiotics: Treating active infection/contamination beyond 24 hours perioperative coverage  Assessed for rehab: Patient returned to prior level of function, rehabilitation not indicated at this time      I have discussed with RN and CRISTEL and SW and other consultants about patient's plan.

## 2017-06-05 NOTE — PROGRESS NOTES
Tele Note  SR/ST   HR 80s-90s, up to 130s  (.20/.08/.36)  frequent PVCs    Primary RN responsible for further monitoring and communication with the MT

## 2017-06-05 NOTE — PROGRESS NOTES
Notified KANE Trent, that patient has been running low grade fever, with temp of 99.7 to 100.3 throughout the night.  KANE Trent says she'll put in an order for patient to have tylenol.

## 2017-06-05 NOTE — PROGRESS NOTES
Patient care assumed from night shift. Sleeping on first rounds with no distress noted.  Oxygen on per mask.

## 2017-06-05 NOTE — ASSESSMENT & PLAN NOTE
Patient's echo showing ejection fraction within normal limits  Stage II diastolic dysfunction from echo patient does have history of coronary doses, questioning about cardiac involvement  Start patient on beta blocker, diuretics and continue to monitor  If patient condition not significantly improved on the above treatment, patient will need inpatient cardiology consult otherwise patient can see PCP and cardiologist as outpatient.

## 2017-06-06 LAB
ANION GAP SERPL CALC-SCNC: 9 MMOL/L (ref 0–11.9)
BASOPHILS # BLD AUTO: 0.6 % (ref 0–1.8)
BASOPHILS # BLD: 0.06 K/UL (ref 0–0.12)
BNP SERPL-MCNC: 74 PG/ML (ref 0–100)
BUN SERPL-MCNC: <5 MG/DL (ref 8–22)
CALCIUM SERPL-MCNC: 8.5 MG/DL (ref 8.4–10.2)
CHLORIDE SERPL-SCNC: 100 MMOL/L (ref 96–112)
CO2 SERPL-SCNC: 27 MMOL/L (ref 20–33)
CREAT SERPL-MCNC: 0.68 MG/DL (ref 0.5–1.4)
EOSINOPHIL # BLD AUTO: 0.52 K/UL (ref 0–0.51)
EOSINOPHIL NFR BLD: 5.1 % (ref 0–6.9)
ERYTHROCYTE [DISTWIDTH] IN BLOOD BY AUTOMATED COUNT: 53.1 FL (ref 35.9–50)
GFR SERPL CREATININE-BSD FRML MDRD: >60 ML/MIN/1.73 M 2
GLUCOSE BLD-MCNC: 112 MG/DL (ref 65–99)
GLUCOSE BLD-MCNC: 116 MG/DL (ref 65–99)
GLUCOSE BLD-MCNC: 123 MG/DL (ref 65–99)
GLUCOSE BLD-MCNC: 128 MG/DL (ref 65–99)
GLUCOSE SERPL-MCNC: 108 MG/DL (ref 65–99)
HCT VFR BLD AUTO: 26 % (ref 37–47)
HGB BLD-MCNC: 7.7 G/DL (ref 12–16)
IMM GRANULOCYTES # BLD AUTO: 0.1 K/UL (ref 0–0.11)
IMM GRANULOCYTES NFR BLD AUTO: 1 % (ref 0–0.9)
LYMPHOCYTES # BLD AUTO: 2.61 K/UL (ref 1–4.8)
LYMPHOCYTES NFR BLD: 25.8 % (ref 22–41)
MCH RBC QN AUTO: 20.6 PG (ref 27–33)
MCHC RBC AUTO-ENTMCNC: 29.6 G/DL (ref 33.6–35)
MCV RBC AUTO: 69.7 FL (ref 81.4–97.8)
MONOCYTES # BLD AUTO: 1.02 K/UL (ref 0–0.85)
MONOCYTES NFR BLD AUTO: 10.1 % (ref 0–13.4)
NEUTROPHILS # BLD AUTO: 5.82 K/UL (ref 2–7.15)
NEUTROPHILS NFR BLD: 57.4 % (ref 44–72)
NRBC # BLD AUTO: 0.1 K/UL
NRBC BLD AUTO-RTO: 1 /100 WBC
PLATELET # BLD AUTO: 308 K/UL (ref 164–446)
PMV BLD AUTO: 10.8 FL (ref 9–12.9)
POTASSIUM SERPL-SCNC: 3.4 MMOL/L (ref 3.6–5.5)
RBC # BLD AUTO: 3.73 M/UL (ref 4.2–5.4)
SODIUM SERPL-SCNC: 136 MMOL/L (ref 135–145)
WBC # BLD AUTO: 10.1 K/UL (ref 4.8–10.8)

## 2017-06-06 PROCEDURE — 80048 BASIC METABOLIC PNL TOTAL CA: CPT

## 2017-06-06 PROCEDURE — 700105 HCHG RX REV CODE 258: Performed by: INTERNAL MEDICINE

## 2017-06-06 PROCEDURE — 36415 COLL VENOUS BLD VENIPUNCTURE: CPT

## 2017-06-06 PROCEDURE — 700111 HCHG RX REV CODE 636 W/ 250 OVERRIDE (IP): Performed by: INTERNAL MEDICINE

## 2017-06-06 PROCEDURE — 770020 HCHG ROOM/CARE - TELE (206)

## 2017-06-06 PROCEDURE — 82962 GLUCOSE BLOOD TEST: CPT

## 2017-06-06 PROCEDURE — 82164 ANGIOTENSIN I ENZYME TEST: CPT

## 2017-06-06 PROCEDURE — 700102 HCHG RX REV CODE 250 W/ 637 OVERRIDE(OP): Performed by: INTERNAL MEDICINE

## 2017-06-06 PROCEDURE — A9270 NON-COVERED ITEM OR SERVICE: HCPCS | Performed by: INTERNAL MEDICINE

## 2017-06-06 PROCEDURE — A9270 NON-COVERED ITEM OR SERVICE: HCPCS | Performed by: NURSE PRACTITIONER

## 2017-06-06 PROCEDURE — 700102 HCHG RX REV CODE 250 W/ 637 OVERRIDE(OP): Performed by: NURSE PRACTITIONER

## 2017-06-06 PROCEDURE — 99232 SBSQ HOSP IP/OBS MODERATE 35: CPT | Performed by: INTERNAL MEDICINE

## 2017-06-06 PROCEDURE — 85025 COMPLETE CBC W/AUTO DIFF WBC: CPT

## 2017-06-06 PROCEDURE — 83880 ASSAY OF NATRIURETIC PEPTIDE: CPT

## 2017-06-06 RX ORDER — FUROSEMIDE 10 MG/ML
20 INJECTION INTRAMUSCULAR; INTRAVENOUS
Status: DISCONTINUED | OUTPATIENT
Start: 2017-06-06 | End: 2017-06-07

## 2017-06-06 RX ADMIN — CEFTRIAXONE 2 G: 2 INJECTION, POWDER, FOR SOLUTION INTRAMUSCULAR; INTRAVENOUS at 08:56

## 2017-06-06 RX ADMIN — LACTOBACILLUS ACIDOPHILUS / LACTOBACILLUS BULGARICUS 1 PACKET: 100 MILLION CFU STRENGTH GRANULES at 11:41

## 2017-06-06 RX ADMIN — FUROSEMIDE 20 MG: 10 INJECTION, SOLUTION INTRAVENOUS at 18:13

## 2017-06-06 RX ADMIN — LACTOBACILLUS ACIDOPHILUS / LACTOBACILLUS BULGARICUS 1 PACKET: 100 MILLION CFU STRENGTH GRANULES at 16:47

## 2017-06-06 RX ADMIN — METOPROLOL SUCCINATE 25 MG: 25 TABLET, EXTENDED RELEASE ORAL at 08:59

## 2017-06-06 RX ADMIN — Medication 400 MG: at 08:59

## 2017-06-06 RX ADMIN — Medication 400 MG: at 21:42

## 2017-06-06 RX ADMIN — ENOXAPARIN SODIUM 40 MG: 100 INJECTION SUBCUTANEOUS at 08:56

## 2017-06-06 RX ADMIN — AZITHROMYCIN 250 MG: 250 TABLET, FILM COATED ORAL at 08:56

## 2017-06-06 RX ADMIN — FERROUS SULFATE TAB 325 MG (65 MG ELEMENTAL FE) 325 MG: 325 (65 FE) TAB at 11:41

## 2017-06-06 RX ADMIN — DOCUSATE SODIUM AND SENNOSIDES 2 TABLET: 8.6; 5 TABLET, FILM COATED ORAL at 21:42

## 2017-06-06 RX ADMIN — LACTOBACILLUS ACIDOPHILUS / LACTOBACILLUS BULGARICUS 1 PACKET: 100 MILLION CFU STRENGTH GRANULES at 08:59

## 2017-06-06 RX ADMIN — ACETAMINOPHEN 650 MG: 325 TABLET, FILM COATED ORAL at 08:55

## 2017-06-06 RX ADMIN — DOCUSATE SODIUM AND SENNOSIDES 2 TABLET: 8.6; 5 TABLET, FILM COATED ORAL at 08:59

## 2017-06-06 RX ADMIN — FERROUS SULFATE TAB 325 MG (65 MG ELEMENTAL FE) 325 MG: 325 (65 FE) TAB at 16:47

## 2017-06-06 RX ADMIN — FERROUS SULFATE TAB 325 MG (65 MG ELEMENTAL FE) 325 MG: 325 (65 FE) TAB at 08:58

## 2017-06-06 RX ADMIN — ACETAMINOPHEN 650 MG: 325 TABLET, FILM COATED ORAL at 16:49

## 2017-06-06 ASSESSMENT — PAIN SCALES - GENERAL
PAINLEVEL_OUTOF10: 2
PAINLEVEL_OUTOF10: 0

## 2017-06-06 ASSESSMENT — ENCOUNTER SYMPTOMS
DIZZINESS: 0
COUGH: 0
ABDOMINAL PAIN: 0
PALPITATIONS: 0
NAUSEA: 0
VOMITING: 0
WEIGHT LOSS: 0
BLURRED VISION: 0
FEVER: 0
MYALGIAS: 1
HEADACHES: 0
SPUTUM PRODUCTION: 0
SHORTNESS OF BREATH: 1
CHILLS: 0

## 2017-06-06 NOTE — PROGRESS NOTES
Pt sleeping, wakes easily for accucheck.  No c/o pain or distress at this time.  Sats 92% on 6L NC. Resp reg, unlabored and pt appears comfortable.

## 2017-06-06 NOTE — PROGRESS NOTES
Telemetry Summary    Rhythm SR/ST  HR 80-100s  Ectopy r PVC, [place]  MO 0.16  QRS 0.08  QT 0.36    This strip has been recorded only. Primary RN responsible for pt care and notifying doctor if needed.

## 2017-06-06 NOTE — PROGRESS NOTES
Pt c/o soreness in rib area.  Assessed, lungs dim overall and cont sob and on 6LNC, sats 94% currently but desats with exertion.  BLE +1 edema.  Adm oxycodone as charted.  No other needs at this time per pt.

## 2017-06-06 NOTE — CARE PLAN
Problem: Respiratory:  Goal: Respiratory status will improve  Intervention: Educate and encourage incentive spirometry usage  Using IS q 1 hr if prompted slept 6 hours of this shift c/o fatigreyson and H/A. Attempted to ambulate in au on room air good endurance but desats to 83% returned to recyliner with O2 at 6l/nc

## 2017-06-06 NOTE — PROGRESS NOTES
Telemetry summary    Rhythm: SR   HR: 80's  Ectopy: rare PVC  AR: 0.16  QRS: 0.08  QT: 0.40      This strip has been recorded only. Primary RN responsible for patient care and notifying doctor if needed.

## 2017-06-06 NOTE — DISCHARGE PLANNING
Care Transition Team Assessment    Information Source  Orientation : Oriented x 4  Information Given By: Patient  Informant's Name: Daniel Deshpande  Who is responsible for making decisions for patient? : Patient    Readmission Evaluation  Is this a readmission?: No    Elopement Risk  Legal Hold: No  Ambulatory or Self Mobile in Wheelchair: Yes  Disoriented: No  Psychiatric Symptoms: None  History of Wandering: No  Elopement this Admit: No  Vocalizing Wanting to Leave: No  Displays Behaviors, Body Language Wanting to Leave: No-Not at Risk for Elopement  Elopement Risk: Not at Risk for Elopement    Interdisciplinary Discharge Planning  Does Admitting Nurse Feel This Could be a Complex Discharge?: No  Primary Care Physician: Dr. Cho  Lives with - Patient's Self Care Capacity: Spouse, Child Less than 18 Years of Age, Adult Children  Patient or legal guardian wants to designate a caregiver (see row info): No  Support Systems: Children, Family Member(s), Spouse / Significant Other  Housing / Facility: 1 Story Apartment / Condo  Do You Take your Prescribed Medications Regularly:  (no home meds)  Able to Return to Previous ADL's: Yes  Mobility Issues: No  Prior Services: None  Patient Expects to be Discharged to:: home  Durable Medical Equipment: Not Applicable    Discharge Preparedness  What is your plan after discharge?: Home with help  What are your discharge supports?: Spouse  Prior Functional Level: Independent with Activities of Daily Living    Functional Assesment  Prior Functional Level: Independent with Activities of Daily Living    Finances  Financial Barriers to Discharge: No  Prescription Coverage: Yes    Vision / Hearing Impairment  Vision Impairment : No  Hearing Impairment : No    Values / Beliefs / Concerns  Values / Beliefs Concerns : No  Special Hospitalization Concerns: none    Advance Directive  Advance Directive?: None    Domestic Abuse  Have you ever been the victim of abuse or violence?:  No  Physical Abuse or Sexual Abuse: No  Verbal Abuse or Emotional Abuse: No  Possible Abuse Reported to:: Not Applicable    Psychological Assessment  History of Substance Abuse: None  History of Psychiatric Problems: No    Discharge Risks or Barriers  Discharge risks or barriers?: No    Anticipated Discharge Information  Anticipated discharge disposition: Home

## 2017-06-06 NOTE — PROGRESS NOTES
No change from morning assessment except has remained on oxygen by cannula at 6 liters today and maintained a saturation above 90 percent.  Sleeps unless aroused for care of meals and becomes easily fatigued when doing any activity.

## 2017-06-07 ENCOUNTER — APPOINTMENT (OUTPATIENT)
Dept: RADIOLOGY | Facility: MEDICAL CENTER | Age: 47
DRG: 871 | End: 2017-06-07
Attending: INTERNAL MEDICINE
Payer: COMMERCIAL

## 2017-06-07 LAB
ALBUMIN SERPL BCP-MCNC: 2.8 G/DL (ref 3.2–4.9)
BACTERIA BLD CULT: NORMAL
BACTERIA BLD CULT: NORMAL
BNP SERPL-MCNC: 24 PG/ML (ref 0–100)
BUN SERPL-MCNC: <5 MG/DL (ref 8–22)
CALCIUM SERPL-MCNC: 8.6 MG/DL (ref 8.4–10.2)
CHLORIDE SERPL-SCNC: 101 MMOL/L (ref 96–112)
CHOLEST SERPL-MCNC: 132 MG/DL (ref 100–199)
CO2 SERPL-SCNC: 27 MMOL/L (ref 20–33)
CREAT SERPL-MCNC: 0.63 MG/DL (ref 0.5–1.4)
ERYTHROCYTE [DISTWIDTH] IN BLOOD BY AUTOMATED COUNT: 54.9 FL (ref 35.9–50)
GFR SERPL CREATININE-BSD FRML MDRD: >60 ML/MIN/1.73 M 2
GLUCOSE BLD-MCNC: 112 MG/DL (ref 65–99)
GLUCOSE SERPL-MCNC: 110 MG/DL (ref 65–99)
HCT VFR BLD AUTO: 27 % (ref 37–47)
HDLC SERPL-MCNC: 19 MG/DL
HGB BLD-MCNC: 7.8 G/DL (ref 12–16)
LDLC SERPL CALC-MCNC: 74 MG/DL
MCH RBC QN AUTO: 20.6 PG (ref 27–33)
MCHC RBC AUTO-ENTMCNC: 28.9 G/DL (ref 33.6–35)
MCV RBC AUTO: 71.4 FL (ref 81.4–97.8)
PHOSPHATE SERPL-MCNC: 3 MG/DL (ref 2.5–4.5)
PLATELET # BLD AUTO: 328 K/UL (ref 164–446)
PMV BLD AUTO: 10.6 FL (ref 9–12.9)
POTASSIUM SERPL-SCNC: 3.5 MMOL/L (ref 3.6–5.5)
RBC # BLD AUTO: 3.78 M/UL (ref 4.2–5.4)
SIGNIFICANT IND 70042: NORMAL
SIGNIFICANT IND 70042: NORMAL
SITE SITE: NORMAL
SITE SITE: NORMAL
SODIUM SERPL-SCNC: 137 MMOL/L (ref 135–145)
SOURCE SOURCE: NORMAL
SOURCE SOURCE: NORMAL
T4 FREE SERPL-MCNC: 0.88 NG/DL (ref 0.58–1.64)
TRIGL SERPL-MCNC: 193 MG/DL (ref 0–149)
WBC # BLD AUTO: 10.1 K/UL (ref 4.8–10.8)

## 2017-06-07 PROCEDURE — 82962 GLUCOSE BLOOD TEST: CPT

## 2017-06-07 PROCEDURE — 99232 SBSQ HOSP IP/OBS MODERATE 35: CPT | Performed by: INTERNAL MEDICINE

## 2017-06-07 PROCEDURE — 770020 HCHG ROOM/CARE - TELE (206)

## 2017-06-07 PROCEDURE — 71020 DX-CHEST-2 VIEWS: CPT

## 2017-06-07 PROCEDURE — 83880 ASSAY OF NATRIURETIC PEPTIDE: CPT

## 2017-06-07 PROCEDURE — 85027 COMPLETE CBC AUTOMATED: CPT

## 2017-06-07 PROCEDURE — 700102 HCHG RX REV CODE 250 W/ 637 OVERRIDE(OP): Performed by: INTERNAL MEDICINE

## 2017-06-07 PROCEDURE — A9270 NON-COVERED ITEM OR SERVICE: HCPCS | Performed by: INTERNAL MEDICINE

## 2017-06-07 PROCEDURE — 700111 HCHG RX REV CODE 636 W/ 250 OVERRIDE (IP): Performed by: INTERNAL MEDICINE

## 2017-06-07 PROCEDURE — 84439 ASSAY OF FREE THYROXINE: CPT

## 2017-06-07 PROCEDURE — 700102 HCHG RX REV CODE 250 W/ 637 OVERRIDE(OP): Performed by: NURSE PRACTITIONER

## 2017-06-07 PROCEDURE — 36415 COLL VENOUS BLD VENIPUNCTURE: CPT

## 2017-06-07 PROCEDURE — 700105 HCHG RX REV CODE 258: Performed by: INTERNAL MEDICINE

## 2017-06-07 PROCEDURE — A9270 NON-COVERED ITEM OR SERVICE: HCPCS | Performed by: NURSE PRACTITIONER

## 2017-06-07 PROCEDURE — 80061 LIPID PANEL: CPT

## 2017-06-07 PROCEDURE — 80069 RENAL FUNCTION PANEL: CPT

## 2017-06-07 RX ORDER — SIMVASTATIN 20 MG
20 TABLET ORAL EVERY EVENING
Status: DISCONTINUED | OUTPATIENT
Start: 2017-06-07 | End: 2017-06-08 | Stop reason: HOSPADM

## 2017-06-07 RX ORDER — FUROSEMIDE 40 MG/1
40 TABLET ORAL
Status: DISCONTINUED | OUTPATIENT
Start: 2017-06-07 | End: 2017-06-08 | Stop reason: HOSPADM

## 2017-06-07 RX ADMIN — FUROSEMIDE 20 MG: 10 INJECTION, SOLUTION INTRAVENOUS at 08:35

## 2017-06-07 RX ADMIN — ACETAMINOPHEN 650 MG: 325 TABLET, FILM COATED ORAL at 08:33

## 2017-06-07 RX ADMIN — METOPROLOL SUCCINATE 25 MG: 25 TABLET, EXTENDED RELEASE ORAL at 09:00

## 2017-06-07 RX ADMIN — Medication 400 MG: at 09:00

## 2017-06-07 RX ADMIN — FERROUS SULFATE TAB 325 MG (65 MG ELEMENTAL FE) 325 MG: 325 (65 FE) TAB at 17:34

## 2017-06-07 RX ADMIN — Medication 400 MG: at 22:15

## 2017-06-07 RX ADMIN — SIMVASTATIN 20 MG: 20 TABLET, FILM COATED ORAL at 22:19

## 2017-06-07 RX ADMIN — ACETAMINOPHEN 650 MG: 325 TABLET, FILM COATED ORAL at 17:34

## 2017-06-07 RX ADMIN — ENOXAPARIN SODIUM 40 MG: 100 INJECTION SUBCUTANEOUS at 08:38

## 2017-06-07 RX ADMIN — CEFTRIAXONE 2 G: 2 INJECTION, POWDER, FOR SOLUTION INTRAMUSCULAR; INTRAVENOUS at 08:35

## 2017-06-07 RX ADMIN — FERROUS SULFATE TAB 325 MG (65 MG ELEMENTAL FE) 325 MG: 325 (65 FE) TAB at 07:30

## 2017-06-07 ASSESSMENT — ENCOUNTER SYMPTOMS
CHILLS: 0
PALPITATIONS: 0
DIZZINESS: 0
HEADACHES: 0
BLURRED VISION: 0
ABDOMINAL PAIN: 0
HEMOPTYSIS: 0
NAUSEA: 0
SPUTUM PRODUCTION: 0
VOMITING: 0
COUGH: 0
FEVER: 0

## 2017-06-07 ASSESSMENT — PAIN SCALES - GENERAL: PAINLEVEL_OUTOF10: 5

## 2017-06-07 NOTE — PROGRESS NOTES
Telemetry Summary    Rhythm SR  HR 70-80  Ectopy o PVC  TX 0.18  QRS 0.08  QT 0.40    This strip has been recorded only. Primary RN responsible for pt care and notifying doctor if needed.

## 2017-06-07 NOTE — PROGRESS NOTES
Received report from day shift nurse. Assumed pt care at 1915. Pt is A&Ox4, resting comfortably in bed, family at bedside. Pt on 3.5L of oxygen via nasal cannula. No signs of SOB/respiratory distress. Labs noted, VSS. Pt c/o no pain at this moment. Will returns for night time meds and assessment. Encouraged pt the importance to call for assistance.  Fall precautions in place. Bed locked. Bed at lowest position. Call light and personal belongings within reach. Continue to monitor

## 2017-06-07 NOTE — PROGRESS NOTES
Hospital Medicine Interval Note  Date of Service:  6/7/2017    Chief Complaint  46 y.o.-year-old female admitted 6/2/2017 with PNA and sepsis.     Interval Problem Update:  6/3 feeling still SOB and some R chest pain. Patient's pain is local, 4-6/10, intermittent and does not radiate to other location, sharp and with some tingling. Can be controlled by pain meds. BP boarderline.    6/4 worsening of SOB this morning and signs of acute pulm edema. No CP. No fever, some wheezing and cough. Was treated with iv lasix x 1 stat with some improvement.    6/5 less shortness of breath this morning, still feels very tired, bilateral lower extremity mild edema, also has vaginal treatment, clinically still looks like overloaded.    6/6 States feeling somewhat better. Still has the malaise. Oxygen drops as soon as she gets out of bed.     6/7 Feeling much better with IV lasix, swelling has subsided. Oxygen requirement down to 4L.        Consultants/Specialty:  Pulmonary-Dr. Rene       Review of Systems   Constitutional: Negative for fever and chills.   Eyes: Negative for blurred vision.   Respiratory: Negative for cough, hemoptysis and sputum production.    Cardiovascular: Negative for chest pain and palpitations.   Gastrointestinal: Negative for nausea, vomiting and abdominal pain.   Skin: Negative for rash.   Neurological: Negative for dizziness and headaches.      Physical Exam Laboratory/Imaging   Filed Vitals:    06/06/17 1600 06/06/17 2329 06/07/17 0243 06/07/17 0800   BP: 120/68 117/50  101/53   Pulse: 90 80  81   Temp:  37.8 °C (100 °F)  37.2 °C (99 °F)   Resp: 18 18  18   Height:       Weight:       SpO2: 90% 99% 93% 98%     Physical Exam   Constitutional: She is oriented to person, place, and time. She appears well-developed and well-nourished.   HENT:   Head: Normocephalic and atraumatic.   Eyes: EOM are normal.   Neck: Neck supple.   Cardiovascular: Normal rate, regular rhythm and normal heart sounds.  Exam reveals  no gallop and no friction rub.    No murmur heard.  Pulmonary/Chest: Effort normal. No respiratory distress. She exhibits no tenderness.   Rales auscultated in the TENZIN   Abdominal: Soft. Bowel sounds are normal. She exhibits no distension. There is no tenderness. There is no rebound.   Musculoskeletal: She exhibits no edema or tenderness.   Neurological: She is alert and oriented to person, place, and time.    Lab Results   Component Value Date/Time    WBC 10.1 06/07/2017 04:28 AM    HEMOGLOBIN 7.8* 06/07/2017 04:28 AM    HEMATOCRIT 27.0* 06/07/2017 04:28 AM    PLATELET COUNT 328 06/07/2017 04:28 AM     Lab Results   Component Value Date/Time    SODIUM 137 06/07/2017 04:28 AM    POTASSIUM 3.5* 06/07/2017 04:28 AM    CHLORIDE 101 06/07/2017 04:28 AM    CO2 27 06/07/2017 04:28 AM    GLUCOSE 110* 06/07/2017 04:28 AM    BUN <5* 06/07/2017 04:28 AM    CREATININE 0.63 06/07/2017 04:28 AM      Assessment/Plan    Acute hypoxic respiratory failure , improving with lower O2 requirement  Pulmonary edema, resolving with lasix  Community acquired pneumonia, on antibiotics  Grade II diastolic dysfunction  Sepsis secondary to pneumonia  Hypokalemia  Microcytic anemia  Obesity  Pulmonary sarcoidosis  Tobacco abuse  Hypertriglyceremia    Plan:  -Continue with oxygen and pulmonary support  -Continue with antibiotic treatment of rocephin  -scheduled PO lasix 40mg daily  -Pulmonology consulted  -Pending Ace level  -Continue with insulin and accu checks  -Continue with metoprolol  -Patient likely needs an outpatient stress test once pulmonary status stable  -Counseled against smoking  -I will start her on low dose statin    Disposition: Continue with diuresis, IV antibiotics. Evaluation for home O2.  Labs reviewed, Medications reviewed and Radiology images reviewed  [place] catheter: No Anderson and Critically Ill - Requiring Accurate Measurement of Urinary Output      DVT Prophylaxis: Enoxaparin (Lovenox)          Diet: Cardiac    Code  status:  Perform All life Sustaining Interventions      Acute respiratory failure is NOT due to the sepsis

## 2017-06-07 NOTE — PROGRESS NOTES
Hospital Medicine Interval Note  Date of Service:  6/6/2017    Chief Complaint:  46 y.o.-year-old female admitted 6/2/2017 with PNA and sepsis.     Interval Problem Update:  6/3 feeling still SOB and some R chest pain. Patient's pain is local, 4-6/10, intermittent and does not radiate to other location, sharp and with some tingling. Can be controlled by pain meds. BP boarderline.    6/4 worsening of SOB this morning and signs of acute pulm edema. No CP. No fever, some wheezing and cough. Was treated with iv lasix x 1 stat with some improvement.    6/5 less shortness of breath this morning, still feels very tired, bilateral lower extremity mild edema, also has vaginal treatment, clinically still looks like overloaded.    6/6 States feeling somewhat better. Still has the malaise. Oxygen drops as soon as she gets out of bed.       Consultants/Specialty:  None.         Review of Systems   Constitutional: Negative for fever, chills and weight loss.   Eyes: Negative for blurred vision.   Respiratory: Positive for shortness of breath. Negative for cough and sputum production.    Cardiovascular: Positive for leg swelling. Negative for chest pain and palpitations.   Gastrointestinal: Negative for nausea, vomiting and abdominal pain.   Musculoskeletal: Positive for myalgias.   Skin: Negative for rash.   Neurological: Negative for dizziness and headaches.      Physical Exam Laboratory/Imaging   Filed Vitals:    06/06/17 0800 06/06/17 1400 06/06/17 1500 06/06/17 1600   BP: 121/59 116/63  120/68   Pulse: 104 86  90   Temp: 37.1 °C (98.7 °F) 37 °C (98.6 °F)     Resp: 18 18  18   Height:       Weight:       SpO2: 100% 91% 83% 90%     Physical Exam   Constitutional: She is oriented to person, place, and time. She appears well-developed and well-nourished.   HENT:   Head: Normocephalic and atraumatic.   Eyes: Conjunctivae and EOM are normal.   Neck: Neck supple.   Cardiovascular: Normal rate, regular rhythm, normal heart sounds and  intact distal pulses.  Exam reveals no gallop and no friction rub.    No murmur heard.  Pulmonary/Chest: Effort normal. No respiratory distress.   Diffuse bibasilar crackles.    Abdominal: Soft. Bowel sounds are normal. She exhibits no distension. There is no tenderness. There is no rebound.   Musculoskeletal: She exhibits edema. She exhibits no tenderness.   Neurological: She is alert and oriented to person, place, and time. No cranial nerve deficit.   Skin: Skin is warm and dry.    Lab Results   Component Value Date/Time    WBC 10.1 06/06/2017 04:41 AM    HEMOGLOBIN 7.7* 06/06/2017 04:41 AM    HEMATOCRIT 26.0* 06/06/2017 04:41 AM    PLATELET COUNT 308 06/06/2017 04:41 AM     Lab Results   Component Value Date/Time    SODIUM 136 06/06/2017 04:41 AM    POTASSIUM 3.4* 06/06/2017 04:41 AM    CHLORIDE 100 06/06/2017 04:41 AM    CO2 27 06/06/2017 04:41 AM    GLUCOSE 108* 06/06/2017 04:41 AM    BUN <5* 06/06/2017 04:41 AM    CREATININE 0.68 06/06/2017 04:41 AM          Acute hypoxic respiratory failure   Pulmonary edema  Community acquired pneumonia  Grade II diastolic dysfunction  Sepsis secondary to pneumonia  Hypokalemia  Microcytic anemia  Obesity  Pulmonary sarcoidosis  Tobacco abuse    Plan:  -Continue with oxygen and pulmonary support  -Continue with antibiotic treatment of rocephin  -started patient on 1 dose of IV lasix  -Monitor BNP, CXR closely  -I will check her Ace level  -Continue with insulin and accu checks  -Continue with metoprolol  -Patient likely needs an outpatient stress test once pulmonary status stable  -I will check lipid panel on her  -Counseled against smoking    Disposition: Continue with diuresis, IV antibiotics. F/u on CXR, Ace-level, lipid panel and BNP.                     Medications reviewed, Labs reviewed and Radiology images reviewed  Anderson catheter: No Anderson      DVT Prophylaxis: Enoxaparin (Lovenox)          Diet: Cardiac    Code status:  Perform All life Sustaining Interventions

## 2017-06-07 NOTE — CARE PLAN
Problem: Venous Thromboembolism (VTW)/Deep Vein Thrombosis (DVT) Prevention:  Goal: Patient will participate in Venous Thrombosis (VTE)/Deep Vein Thrombosis (DVT)Prevention Measures    06/07/17 0800   OTHER   Risk Assessment Score 2   VTE RISK Moderate   Pharmacologic Prophylaxis Used LMWH: Enoxaparin(Lovenox)         Problem: Respiratory:  Goal: Respiratory status will improve  Outcome: PROGRESSING AS EXPECTED  Assessing need for supplemental O2, and weaning as appropriate.

## 2017-06-08 VITALS
HEART RATE: 77 BPM | OXYGEN SATURATION: 90 % | WEIGHT: 239.64 LBS | HEIGHT: 67 IN | DIASTOLIC BLOOD PRESSURE: 68 MMHG | TEMPERATURE: 99 F | RESPIRATION RATE: 19 BRPM | SYSTOLIC BLOOD PRESSURE: 108 MMHG | BODY MASS INDEX: 37.61 KG/M2

## 2017-06-08 PROBLEM — A41.9 SEPSIS, UNSPECIFIED: Status: RESOLVED | Noted: 2017-06-03 | Resolved: 2017-06-08

## 2017-06-08 PROBLEM — J96.00 ACUTE RESPIRATORY FAILURE (HCC): Status: RESOLVED | Noted: 2017-06-02 | Resolved: 2017-06-08

## 2017-06-08 PROBLEM — I50.31 ACUTE DIASTOLIC CHF (CONGESTIVE HEART FAILURE) (HCC): Status: RESOLVED | Noted: 2017-06-05 | Resolved: 2017-06-08

## 2017-06-08 PROBLEM — D86.9 SARCOIDOSIS: Status: ACTIVE | Noted: 2017-06-08

## 2017-06-08 PROBLEM — R73.9 HYPERGLYCEMIA: Status: RESOLVED | Noted: 2017-06-03 | Resolved: 2017-06-08

## 2017-06-08 PROBLEM — D72.829 LEUCOCYTOSIS: Status: RESOLVED | Noted: 2017-06-03 | Resolved: 2017-06-08

## 2017-06-08 PROBLEM — E87.20 LACTIC ACIDOSIS: Status: RESOLVED | Noted: 2017-06-05 | Resolved: 2017-06-08

## 2017-06-08 PROBLEM — J81.0 ACUTE PULMONARY EDEMA (HCC): Status: RESOLVED | Noted: 2017-06-04 | Resolved: 2017-06-08

## 2017-06-08 LAB
ACE SERPL-CCNC: 28 U/L (ref 9–67)
ALBUMIN SERPL BCP-MCNC: 3.2 G/DL (ref 3.2–4.9)
BUN SERPL-MCNC: 8 MG/DL (ref 8–22)
CALCIUM SERPL-MCNC: 8.6 MG/DL (ref 8.4–10.2)
CHLORIDE SERPL-SCNC: 102 MMOL/L (ref 96–112)
CO2 SERPL-SCNC: 25 MMOL/L (ref 20–33)
CREAT SERPL-MCNC: 0.67 MG/DL (ref 0.5–1.4)
ERYTHROCYTE [DISTWIDTH] IN BLOOD BY AUTOMATED COUNT: 57.5 FL (ref 35.9–50)
GFR SERPL CREATININE-BSD FRML MDRD: >60 ML/MIN/1.73 M 2
GLUCOSE SERPL-MCNC: 112 MG/DL (ref 65–99)
HCT VFR BLD AUTO: 27.7 % (ref 37–47)
HGB BLD-MCNC: 7.9 G/DL (ref 12–16)
MCH RBC QN AUTO: 20.8 PG (ref 27–33)
MCHC RBC AUTO-ENTMCNC: 28.5 G/DL (ref 33.6–35)
MCV RBC AUTO: 72.9 FL (ref 81.4–97.8)
PHOSPHATE SERPL-MCNC: 2.7 MG/DL (ref 2.5–4.5)
PLATELET # BLD AUTO: 398 K/UL (ref 164–446)
PMV BLD AUTO: 10.8 FL (ref 9–12.9)
POTASSIUM SERPL-SCNC: 3.4 MMOL/L (ref 3.6–5.5)
RBC # BLD AUTO: 3.8 M/UL (ref 4.2–5.4)
SODIUM SERPL-SCNC: 138 MMOL/L (ref 135–145)
WBC # BLD AUTO: 9.6 K/UL (ref 4.8–10.8)

## 2017-06-08 PROCEDURE — A9270 NON-COVERED ITEM OR SERVICE: HCPCS | Performed by: INTERNAL MEDICINE

## 2017-06-08 PROCEDURE — 36415 COLL VENOUS BLD VENIPUNCTURE: CPT

## 2017-06-08 PROCEDURE — 700105 HCHG RX REV CODE 258: Performed by: INTERNAL MEDICINE

## 2017-06-08 PROCEDURE — 700111 HCHG RX REV CODE 636 W/ 250 OVERRIDE (IP): Performed by: INTERNAL MEDICINE

## 2017-06-08 PROCEDURE — 85027 COMPLETE CBC AUTOMATED: CPT

## 2017-06-08 PROCEDURE — 700102 HCHG RX REV CODE 250 W/ 637 OVERRIDE(OP): Performed by: INTERNAL MEDICINE

## 2017-06-08 PROCEDURE — 99238 HOSP IP/OBS DSCHRG MGMT 30/<: CPT | Performed by: INTERNAL MEDICINE

## 2017-06-08 PROCEDURE — 99406 BEHAV CHNG SMOKING 3-10 MIN: CPT

## 2017-06-08 PROCEDURE — 80069 RENAL FUNCTION PANEL: CPT

## 2017-06-08 RX ORDER — SUMATRIPTAN 25 MG/1
25 TABLET, FILM COATED ORAL ONCE
Status: COMPLETED | OUTPATIENT
Start: 2017-06-08 | End: 2017-06-08

## 2017-06-08 RX ORDER — SUMATRIPTAN 25 MG/1
25-100 TABLET, FILM COATED ORAL
Qty: 10 TAB | Refills: 0 | Status: SHIPPED | OUTPATIENT
Start: 2017-06-08 | End: 2018-01-25

## 2017-06-08 RX ORDER — FERROUS SULFATE 325(65) MG
325 TABLET ORAL DAILY
Qty: 30 TAB | Refills: 1 | Status: SHIPPED | OUTPATIENT
Start: 2017-06-08 | End: 2018-01-25

## 2017-06-08 RX ORDER — FUROSEMIDE 40 MG/1
40 TABLET ORAL DAILY
Qty: 30 TAB | Refills: 0 | Status: SHIPPED | OUTPATIENT
Start: 2017-06-08 | End: 2018-01-25

## 2017-06-08 RX ORDER — LEVOFLOXACIN 500 MG/1
500 TABLET, FILM COATED ORAL DAILY
Qty: 7 TAB | Refills: 0 | Status: SHIPPED | OUTPATIENT
Start: 2017-06-08 | End: 2017-06-15

## 2017-06-08 RX ORDER — ALBUTEROL SULFATE 90 UG/1
2 AEROSOL, METERED RESPIRATORY (INHALATION) EVERY 6 HOURS PRN
Qty: 8.5 G | Refills: 1 | Status: SHIPPED | OUTPATIENT
Start: 2017-06-08 | End: 2018-01-25

## 2017-06-08 RX ORDER — POTASSIUM CHLORIDE 20 MEQ/1
20 TABLET, EXTENDED RELEASE ORAL ONCE
Status: COMPLETED | OUTPATIENT
Start: 2017-06-08 | End: 2017-06-08

## 2017-06-08 RX ADMIN — OXYCODONE HYDROCHLORIDE 5 MG: 5 TABLET ORAL at 05:06

## 2017-06-08 RX ADMIN — CEFTRIAXONE 2 G: 2 INJECTION, POWDER, FOR SOLUTION INTRAMUSCULAR; INTRAVENOUS at 08:53

## 2017-06-08 RX ADMIN — ENOXAPARIN SODIUM 40 MG: 100 INJECTION SUBCUTANEOUS at 08:49

## 2017-06-08 RX ADMIN — METOPROLOL SUCCINATE 25 MG: 25 TABLET, EXTENDED RELEASE ORAL at 08:49

## 2017-06-08 RX ADMIN — Medication 400 MG: at 08:49

## 2017-06-08 RX ADMIN — POTASSIUM CHLORIDE 20 MEQ: 1500 TABLET, EXTENDED RELEASE ORAL at 16:56

## 2017-06-08 RX ADMIN — SUMATRIPTAN SUCCINATE 25 MG: 25 TABLET, FILM COATED ORAL at 16:56

## 2017-06-08 RX ADMIN — FERROUS SULFATE TAB 325 MG (65 MG ELEMENTAL FE) 325 MG: 325 (65 FE) TAB at 08:49

## 2017-06-08 RX ADMIN — FUROSEMIDE 40 MG: 40 TABLET ORAL at 08:49

## 2017-06-08 RX ADMIN — SUMATRIPTAN SUCCINATE 25 MG: 25 TABLET, FILM COATED ORAL at 08:49

## 2017-06-08 RX ADMIN — FERROUS SULFATE TAB 325 MG (65 MG ELEMENTAL FE) 325 MG: 325 (65 FE) TAB at 16:56

## 2017-06-08 ASSESSMENT — PAIN SCALES - GENERAL
PAINLEVEL_OUTOF10: 2
PAINLEVEL_OUTOF10: 6
PAINLEVEL_OUTOF10: 7

## 2017-06-08 NOTE — PROGRESS NOTES
Pulmonary progress note        Chief Complaint: Shortness of breath, cough and sputum production    History of Present Illness: 46 y.o. female admitted for pneumonia with evidence of sepsis, volume repletion. Respiratory insufficiency with bilateral infiltrates. History of sarcoidosis, treated in [place] with prednisone. No active sarcoid recently. Relocated to [place]. Presently has cough with purulent sputum. Not toxic or febrile.    ROS:  Respiratory: positive cough, positive shortness of breath and positive sputum production, Cardiac: negative, GI: negative.  All other systems negative.    Interval Events:  24 hour interval history reviewed  Discharge planning, oral antibiotics, Ace level negative, clinical status improved. Likely discharged on oxygen, will ask my office to arrange outpatient follow-up with x-ray on arrival  PFSH:  No change.    Respiratory:     Pulse Oximetry: 90 %  Chest Tube Drains:          Exam: rhonchi bibasilar  ImagingAvailable data reviewed         Invalid input(s): [id number]    HemoDynamics:  Pulse: 77  Blood Pressure: 108/68 mmHg       Exam: regular rate and rhythm  Imaging: None - Reviewed  Recent Labs      06/06/17   0441  06/07/17   0428   BNPBTYPENAT  74  24       Neuro:  GCS         Exam: no focal deficits noted mental status intact  Imaging: None - Reviewed    Fluids:  Intake/Output       06/06/17 0700 - 06/07/17 0659 06/07/17 0700 - 06/08/17 0659 06/08/17 0700 - 06/09/17 0659      [id number] 6386-9030 Total 5218-5424 8539-3014 Total 3595-3472 2854-6781 Total       Intake    P.O.  1050  -- 1050  790  -- 790  --  -- --    P.O. 1050 -- 1050 790 -- 790 -- -- --    I.V.  100  -- 100  100  -- 100  --  -- --    antibiotics 100 -- 100 100 -- 100 -- -- --    Total Intake 1150 -- 1150 890 -- 890 -- -- --       Output    Urine  1400  -- 1400  1600  -- 1600  --  -- --    Void (ml) 1400 -- 1400 1600 -- 1600 -- -- --    Total Output 1400 -- 1400 1600 -- 1600 -- -- --       Net I/O     -250  -- -250 -710 -- -710 -- -- --           Recent Labs      17   0524   SODIUM  136  137  138   POTASSIUM  3.4*  3.5*  3.4*   CHLORIDE  100  101  102   CO2  27  27  25   BUN  <5*  <5*  8   CREATININE  0.68  0.63  0.67   PHOSPHORUS   --   3.0  2.7   CALCIUM  8.5  8.6  8.6       GI/Nutrition:  Exam: abdomen is soft and non-tender  Imaging: None - Reviewed  taking PO  Liver Function  Recent Labs      17   0524   GLUCOSE  108*  110*  112*       Heme:  Recent Labs      17   0524   RBC  3.73*  3.78*  3.80*   HEMOGLOBIN  7.7*  7.8*  7.9*   HEMATOCRIT  26.0*  27.0*  27.7*   PLATELETCT  308  328  398       Infectious Disease:  Temp  Av.2 °C (99 °F)  Min: 37.2 °C (99 °F)  Max: 37.2 °C (99 °F)  Micro: antibiotics reviewed  Recent Labs      17   0524   WBC  10.1  10.1  9.6   NEUTSPOLYS  57.40   --    --    LYMPHOCYTES  25.80   --    --    MONOCYTES  10.10   --    --    EOSINOPHILS  5.10   --    --    BASOPHILS  0.60   --    --      Current Facility-Administered Medications   Medication Dose Frequency Provider Last Rate Last Dose   • furosemide (LASIX) tablet 40 mg  40 mg Q DAY Kim Zuñiga, D.O.   40 mg at 17 0849   • simvastatin (ZOCOR) tablet 20 mg  20 mg Q EVENING Kim Zuñiga, D.O.   20 mg at 17 2219   • acetaminophen (TYLENOL) tablet 650 mg  650 mg Q4HRS PRN Rebeka Bolden A.P.R.NEdward   650 mg at 17 1734   • metoprolol SR (TOPROL XL) tablet 25 mg  25 mg Q DAY Zahra Khan M.D.   25 mg at 17 0849   • ferrous sulfate tablet 325 mg  325 mg TID WITH MEALS Zahra Khan M.D.   325 mg at 17 0849   • cefTRIAXone (ROCEPHIN) 2 g in  mL IVPB  2 g Q24HRS Laurent Herbert M.D.   Stopped at 17 0923   • lactobacillus granules (LACTINEX/FLORANEX) packet 1 Packet  1 Packet TID WITH MEALS Laurent Herbert M.D.   1 Packet at 17 1647   •  senna-docusate (PERICOLACE or SENOKOT S) 8.6-50 MG per tablet 2 Tab  2 Tab BID Laurent Herbert M.D.   2 Tab at 06/06/17 2142    And   • polyethylene glycol/lytes (MIRALAX) PACKET 1 Packet  1 Packet QDAY PRN Laurent Herbert M.D.        And   • magnesium hydroxide (MILK OF MAGNESIA) suspension 30 mL  30 mL QDAY PRN Laurent Herbert M.D.        And   • bisacodyl (DULCOLAX) suppository 10 mg  10 mg QDAY PRN Laurent Herbert M.D.       • Respiratory Care per Protocol   Continuous RT Laurent Herbert M.D.       • enoxaparin (LOVENOX) inj 40 mg  40 mg DAILY Laurent Herbert M.D.   40 mg at 06/08/17 0849   • ondansetron (ZOFRAN) syringe/vial injection 4 mg  4 mg Q4HRS PRN Laurent Herbert M.D.   4 mg at 06/04/17 0432   • ondansetron (ZOFRAN ODT) dispertab 4 mg  4 mg Q4HRS PRN Laurent Herbert M.D.       • promethazine (PHENERGAN) tablet 12.5-25 mg  12.5-25 mg Q4HRS PRN Laurent Herbert M.D.       • promethazine (PHENERGAN) suppository 12.5-25 mg  12.5-25 mg Q4HRS PRN Laurent Herbert M.D.       • prochlorperazine (COMPAZINE) injection 5-10 mg  5-10 mg Q4HRS PRN Laurent Herbert M.D.       • magnesium oxide (MAG-OX) tablet 400 mg  400 mg BID Laurent Herbert M.D.   400 mg at 06/08/17 0849   • insulin lispro (HUMALOG) injection 2-9 Units  2-9 Units 4X/DAY ACHS Laurent Herbert M.D.   Stopped at 06/03/17 2100   • glucose 4 g chewable tablet 16 g  16 g Q15 MIN PRN Laurent Herbert M.D.        And   • dextrose 50% (D50W) injection 25 mL  25 mL Q15 MIN PRN Laurent Herbert M.D.       • albuterol (PROVENTIL) 2.5mg/0.5ml nebulizer solution 2.5 mg  2.5 mg Q4H PRN (RT) Laurent Herbert M.D.   2.5 mg at 06/04/17 0322   • oxycodone immediate-release (ROXICODONE) tablet 5 mg  5 mg Q4HRS PRN Laurent Herbert M.D.   5 mg at 06/08/17 0506     Last reviewed on 6/6/2017  5:58 PM by Kim Zuñiga D.O.    Quality  Measures:  Labs reviewed, Medications reviewed and Radiology images reviewed  Anderson catheter: No Anderson      DVT Prophylaxis: Enoxaparin  (Lovenox)    Ulcer prophylaxis: Not indicated  Antibiotics: Treating active infection/contamination beyond 24 hours perioperative coverage        Problems:  Acute hypoxic respiratory failure improved   Pulmonary edema, resolving  Community acquired pneumonia  Grade II diastolic dysfunction  Sepsis secondary to pneumonia  Hypokalemia  Microcytic anemia  Obesity  Pulmonary sarcoidosis, doubt contributing to acute syndrome, Ace level was low, doubt acute sarcoidosis  Tobacco abuse    Plan:  IV antibiotics , convert to oral., no fever or toxicity presently. Oxygen needs are lower. Do not suspect acute sarcoidosis based on clinical presentation.    Discussed patient condition and risk of morbidity and/or mortality with Patient and hospitalist.  Also reviewed with bedside RN, regarding possible discharge on oxygen, oral antibiotics, explained to patient also she needs pulmonary follow-up in 4-6 weeks; will alert my outpatient office staff    Patient asked specifically if the pneumonia should have resolved after 6 days, I explained that the process may take longer. She is certainly better from the standpoint of early sepsis syndrome, volume replacement completed, secondary pulmonary edema resolving clinically. I explained to her that pneumonia could linger on chest x-ray for 6 weeks. Do not feel prednisone indicated at this time, seems to be more acute community acquired pneumonia. She will need Pneumovax, Prevnar and flu vaccine at appropriate intervals. I explained that we could follow up in my office in 4-6 weeks to recheck the x-ray, insure resolution, at that time obtain old records from [place] regarding her prior sarcoidosis and treatment. If the Ace level is high, then we might consider utilizing prednisone in a titrated tapered fashion but I doubt this is acute sarcoid.    I sent my office clinic manager a note asking that she arrange for follow-up for this patient in 4-6 weeks with physician or nurse  practitioner, to recheck the x-ray and verify resolution of her pneumonia. Also be aware of prior sarcoidosis, and if infiltrates persist may need chest CT scan. If resolved then routine follow-up.

## 2017-06-08 NOTE — PROGRESS NOTES
Received report from day shift nurse. Assumed pt care at 1915. Pt is A&Ox4, resting comfortably in bed. Pt on 3L of oxygen via nasal cannula. No signs of SOB/respiratory distress. Assessment completed, Labs noted, VSS. Pt c/o no pain at this moment. Continuous pulse oxygen in use. Fall precautions in place. Treaded socks on. Bed locked. Bed at lowest position. Encouraged pt to call for assistance. Call light and personal belongings within reach. Continue to monitor

## 2017-06-08 NOTE — DISCHARGE PLANNING
Patient discussed in morning rounds.  Patient reportedly lives at home with her spouse and her discharge plan is to return home when medically able. During rounds possible home O2 was discussed.  SW will await home O2 order if needed.  SS will continue to monitor and assist as needed.

## 2017-06-08 NOTE — RESPIRATORY CARE
"COPD EDUCATION by COPD CLINICAL EDUCATOR  6/8/2017 at 11:00 AM by Kasandra Pierre     Patient interviewed by COPD education team.     Smoking Cessation Intervention 3 -10 minutes completed.     Provided smoking cessation packet with \"Tips to Quit\" and flyer for \"Free Smoking Cessation Classes\". Provided \"Quit Card\" with the phone number to [facility] for free nicotine replacement therapy.   Provided coupon for [place].  "

## 2017-06-08 NOTE — DISCHARGE PLANNING
CAMERON met with this patient at bedside to discuss DME- Home O2 choice.  CAMERON met with this patient bedside and [facility] was chosen.  CAMERON will complete choice form and fax to [facility]AUGH[facility]ter for referral follow up.

## 2017-06-08 NOTE — DISCHARGE PLANNING
CAMERON Vazquez called Muriel at [facility] [place] [facility] [phone] to see what the status is of this pt's referral. Muriel found that Lisa has the referral and is working with insurance company right now.  Muriel will ask Lisa to call CAMERON back with [facility]

## 2017-06-08 NOTE — DISCHARGE PLANNING
Spoke with Lisa at [facility]. Lisa stated she is working on the order right now and will call me once she is done. [facility], notified via phone.

## 2017-06-08 NOTE — DISCHARGE PLANNING
CAMERON Vazquez received a call from Lisa Deshpande stating insurance is approving O2 and they should have it delivered by 1700.  Insurance would not cover the pulse oximeter.  CAMERON informed HUDSON Rodriguez.

## 2017-06-08 NOTE — FACE TO FACE
Face to Face Note  -  Durable Medical Equipment    Kim Zuñiga D.O. - NPI: [id number]  I certify that this patient is under my care and that they had a durable medical equipment(DME)face to face encounter by myself that meets the physician DME face-to-face encounter requirements with this patient on:    Date of encounter:   Patient:                    MRN:                       YOB: 2016  Joann Deshpande  [id number]  1970     The encounter with the patient was in whole, or in part, for the following medical condition, which is the primary reason for durable medical equipment:  Other - pneumonia, diastolic heart failure, sarcoidosis    I certify that, based on my findings, the following durable medical equipment is medically necessary:  Oxygen.    HOME O2 Saturation Measurements:(Values must be present for Home Oxygen orders)  Room air sat at rest: 82  Room air sat with amb: 76  With liters of O2: 3, O2 sat at rest with O2: 95  With Liters of O2: 3, O2 sat with amb with O2 : 92  Is the patient mobile?: Yes    My Clinical findings support the need for the above equipment due to:  Hypoxia    Supporting Symptoms:palpitation, tachypnea

## 2017-06-09 NOTE — DISCHARGE INSTRUCTIONS
Pneumonia, Adult  Drink plenty of fluids. Take ibuprofen for pain and fever, hydrocodone for fever and cough and antibiotics as prescribed. Use Zofran if needed for nausea and vomiting. Return for ill appearance, worsening shortness of breath, uncontrolled vomiting, severe dizziness or failure to improve in 36-48 hours.     Pneumonia is an infection of the lungs.   CAUSES  Pneumonia may be caused by bacteria or a virus. Usually, the infection is caused by breathing in droplets from an infected person's cough or sneeze.   SYMPTOMS   Symptoms of pneumonia include:  · Cough.  · Fever.  · Chest pain.  · Rapid breathing.  · Shortness of breath.  · Shaking chills.  · Mucus production.  DIAGNOSIS   If you have the common symptoms of pneumonia, often your health care provider will confirm the diagnosis with a chest X-ray. The X-ray will show an abnormality in the lung if you have pneumonia. Other tests may be done on your blood, urine, or mucus (sputum) to find the specific cause of your pneumonia. A blood gas test or pulse oximetry test may be needed to check how well your lungs are working.  TREATMENT   Your treatment will depend on whether your pneumonia is caused by bacteria or a virus.   1. Bacterial pneumonia is treated with antibiotic medicine.  2. Pneumonia that is caused by the influenza virus may be treated with an antiviral medicine.  3. Pneumonia that is caused by a virus other than influenza will not respond to antibiotic medicine. This type of pneumonia will have to run its course.   HOME CARE INSTRUCTIONS   1. Cough suppressants may be used if you are losing too much rest from coughing at night. However, you should try to avoid taking cough suppresants. This is because coughing helps to remove mucus from your lungs.  2. Sleep in a semi-upright position at night. Try sleeping in a reclining chair, or place a few pillows under your head.  3. Try using a cold steam vaporizer or humidifier in your home or  bedroom. This may help loosen your mucus.  4. If you were prescribed an antibiotic medicine, finish all of it even if you start to feel better.  5. If you were prescribed an expectorant, take it as directed by your health care provider. This medicine loosens the mucus so you can cough it up.  6. Take medicines only as directed by your health care provider.  7. Do not smoke. If you are a smoker and continue to smoke, your cough may last several weeks after your pneumonia has cleared.  8. Get rest when you feel tired, or as needed.  PREVENTION  A pneumococcal shot (vaccine) is available to prevent a common bacterial cause of pneumonia. This is usually suggested for:  1. People over 65 years old.  2. People on chemotherapy.  3. People with chronic lung problems, such as bronchitis or emphysema.  4. People with immune system problems.  If you are over 65 years old or have a high risk condition, you may receive the pneumococcal vaccine if you have not received it before. In some countries, a routine influenza vaccine is also recommended. This vaccine can help prevent some cases of pneumonia. You may be offered the influenza vaccine as part of your care.  If you are a smoker, it is time to quit in order to prevent pneumonia in the future. You may receive instructions on how to stop smoking. Your health care provider can provide medicines and counseling to help you quit.  SEEK MEDICAL CARE IF:  1. You have a fever.  2. You cannot control your cough with suppressants at night, and you keep losing sleep.  SEEK IMMEDIATE MEDICAL CARE IF:   · You have worsening shortness of breath.  · You have increased chest pain.  · Your sickness becomes worse, especially if you are an older adult or have a weakened immune system.  · You cough up blood.  · You have pain that is getting worse or is not controlled with medicines.  · Your symptoms are getting worse rather than better.     This information is not intended to replace advice given  to you by your health care provider. Make sure you discuss any questions you have with your health care provider.     Document Released: 12/18/2006 Document Revised: 01/08/2016 Document Reviewed: 04/13/2016  [employer] Patient Education ©2016 [employer]    Discharge Instructions    Discharged to home by car with relative. Discharged via wheelchair, hospital escort: Yes.  Special equipment needed: Not Applicable    Be sure to schedule a follow-up appointment with your primary care doctor or any specialists as instructed.     Discharge Plan:   Influenza Vaccine Indication: Indicated: Not available from distributor/ (past flu season)    I understand that a diet low in cholesterol, fat, and sodium is recommended for good health. Unless I have been given specific instructions below for another diet, I accept this instruction as my diet prescription.   Other diet: n/a    Special Instructions: None    · Is patient discharged on Warfarin / Coumadin?   No     · Is patient Post Blood Transfusion?  No    Depression / Suicide Risk    As you are discharged from this [facility] facility, it is important to learn how to keep safe from harming yourself.    Recognize the warning signs:  · Abrupt changes in personality, positive or negative- including increase in energy   · Giving away possessions  · Change in eating patterns- significant weight changes-  positive or negative  · Change in sleeping patterns- unable to sleep or sleeping all the time   · Unwillingness or inability to communicate  · Depression  · Unusual sadness, discouragement and loneliness  · Talk of wanting to die  · Neglect of personal appearance   · Rebelliousness- reckless behavior  · Withdrawal from people/activities they love  · Confusion- inability to concentrate     If you or a loved one observes any of these behaviors or has concerns about self-harm, here's what you can do:  · Talk about it- your feelings and reasons for harming  yourself  · Remove any means that you might use to hurt yourself (examples: pills, rope, extension cords, firearm)  · Get professional help from the community (Mental Health, Substance Abuse, psychological counseling)  · Do not be alone:Call your Safe Contact- someone whom you trust who will be there for you.  · Call your local CRISIS HOTLINE 276-0583 or 999-711-4352  · Call your local Children's Mobile Crisis Response Team [place] (332) 388-0012 or www.[employer]  · Call the toll free National Suicide Prevention Hotlines   · National Suicide Prevention Lifeline 840-910-YUDY (5513)  · National Hope Line Network 800-SUICIDE (286-7674)

## 2017-06-10 NOTE — DISCHARGE SUMMARY
Addendum to previous DC summary dictation:    Diet: low salt diet, increased fiber and low fat diet was encouraged.    Code status: full code    Activity: as tolerated    Also She was given additional 2 weeks off from work. A note was given to her.

## 2017-06-10 NOTE — DISCHARGE SUMMARY
DATE OF ADMISSION:  06/02/2017    DATE OF DISCHARGE:  06/08/2017    DISCHARGE DIAGNOSES:  1.  Acute hypoxic respiratory failure, now on oxygen.  2.  Pulmonary edema, resolving.  3.  Community-acquired pneumonia, on antibiotics.  4.  Grade II diastolic dysfunction.  5.  Sepsis secondary to pneumonia, resolved.  6.  Hypokalemia, resolved.  7.  Microcytic anemia.  8.  Obesity.  9.  Pulmonary sarcoidosis.  10.  Tobacco use.  11.  Hypertriglyceridemia.    SUMMARY OF HOSPITALIZATION:  The patient is a pleasant 46-year-old   -American female who was admitted to the hospital with sepsis secondary   to pneumonia.  Patient was started on antibiotics as well as IV fluids.    However, patient was experiencing worsening of shortness of breath on   06/04/2017 with evidence of pulmonary edema.  Patient was subsequently started   on Lasix with some improvement.  She was also experiencing bilateral lower   extremity pitting edema.  Patient, although on antibiotics as well as Lasix   experienced hypoxia requiring oxygen; however, oxygen demand decreased from 5   liters to 3 liters.  I had an extensive discussion with this patient regarding   her health status.  It appears that patient was previously living in [place]   and was seeing a pulmonologist at that time and was diagnosed with pulmonary   sarcoidosis; however, since she has been in [place], she has not followed up with   anyone.  Therefore, pulmonology was consulted on the patient, Dr. Alejandra Rene saw the patient in the hospital and gave his information, so that she   can follow up with him as an outpatient.  Dr. Alejandra Rene does not believe   that this is secondary to her sarcoidosis as ACE levels were low.  Eventually,   patient's oxygen need lowered and she was able to ambulate about her room and   was stating that she was feeling better.  On the day of discharge, the   patient was stable with stable vitals and was able to ambulate about the room   with her nasal  cannula without any difficulty.  She was not having any   complaint of chest pain or shortness of breath.    CONSULTATIONS:  Dr. Alejandra Rene with pulmonary team.    PROCEDURES OPERATIONS PERFORMED:  None.    IMAGING:  Patient had a portable chest x-ray on 06/02/2017 which showed   cardiomegaly with area of consolidation within the left mid upper lung, likely   representing pneumonia.  Another chest x-ray that was repeated on 06/03/2017   showed no significant internal changes.  The chest x-ray on 06/04/2017 showed   interval increase in interstitial and airspace disease bilaterally, which   could be due to edema and/or pneumonia.    An echocardiogram on 06/04/2017 showed no prior study is available for   comparison.  Mild concentric left ventricle hypertrophy.  Normal left   ventricle systolic function.  Left ventricular ejection fraction is visually   estimated to be 65%.  Grade II diastolic dysfunction.  Normal inferior vena   cava size and inspiratory collapse.  Estimated right ventricular systolic   pressure is 35 mmHg.    Chest x-ray on 06/07/2017 showed again seen bilateral infiltrates.    DISCHARGE INSTRUCTIONS:    FOLLOWUP:  1.  The patient was advised to follow up with her primary care physician, Dr. Cho within 7 days after discharge from the hospital.  2.  Patient was advised to follow up with Dr. Alejandra Rene, pulmonology as   soon as she is able to make an appointment.  Patient was advised to return   immediately to the emergency department if her symptoms of chest pain,   shortness of breath, any fevers or chills or any worsening symptoms.  3.  Patient was also assessed for oxygen and will be sent home on oxygen 3   liters nasal cannula.  The oxygen will be delivered to her prior to discharge.    MEDICATIONS:  1.  The patient was discharged home on Lasix 40 mg 1 tablet by mouth daily.  2.  Albuterol 108 mcg inhalation aerosol.  3.  Advair 250/50 mcg 1 puff by mouth b.i.d.  4.  Levofloxacin 500 mg  tablet, 1 tablet by mouth every day for 7 days.  5.  Ferrous sulfate, take 1 tablet by mouth every day.  6.  Imitrex 1-4 tablets by mouth for migraine.  7.  Lasix was newly added to patient's medication given that she still has   some pulmonary edema.  This will help with her symptoms.  She could eventually   come off of Lasix.  Also, patient was given Advair and albuterol.  She is not   on any home inhalers and I believe she would benefit from them.  Patient will   be discharged home on antibiotics to help with complete resolution of   pneumonia and she requires a followup chest x-ray at her primary care doctor.    Patient had a workup for anemia as her hemoglobin were between 7-8 and it   appears that she is iron deficient.  Therefore, she will be started on ferrous   sulfate.  Patient was educated on this medication and she is aware that this   could cause tarry stool as well as constipation.    8.  Patient was also experiencing migraine headaches since this admission and   Imitrex worked well for her.  9.  Patient was given extensive education on smoking cessation and she   received ____ regarding this.  The patient was also advised to wear seatbelt   when driving and also had an extensive discussion regarding patient's   diastolic dysfunction.  At this time this appears to be stable and as this is   a finding on her new echocardiogram, she was advised to notify her physician   of this and again a copy of this report will be sent to the patient's   physician and possible workup for this grade II diastolic dysfunction.  10.  At the time of discharge, the patient is alert and oriented x4.  She has   a reasonable understanding of her diagnosis, and plan of care.  The patient   understands that she requires to have a followup with her primary care   physician as well as the pulmonologist.  She understands that she requires to   return to the hospital if any of her symptoms worsens.  She understands that   she  requires to stop smoking.  She understands regarding her newly diagnosed   diastolic dysfunction, which is stable at this time.  She understands that she   requires to finish all her medication including antibiotics that was   prescribed to her.  All her questions were answered and patient demonstrated   understanding by repeating the above instructions back to me.       ____________________________________     DO LES Monahan / KALA    DD:  06/10/2017 07:40:11  DT:  06/10/2017 08:10:56    D#:  [id number]  Job#:  [id number]    cc: _____ Marquis

## 2017-06-13 NOTE — ADDENDUM NOTE
Encounter addended by: Rocio Travis on: 6/13/2017  7:20 AM<BR>     Documentation filed: Clinical Notes

## 2017-06-13 NOTE — DOCUMENTATION QUERY
DOCUMENTATION QUERY     PROVIDERS: Please select “Cosign w/ note” to reply to query.     To better represent the severity of illness of your patient, please review the following information and exercise your independent professional judgment in responding to this query.         Per coding guidelines, the clinical indicator for severe sepsis is an associated organ failure/organ dysfunction/shock documented as being DUE to the sepsis.  Sepsis and acute respiratory failure without a documented link are documented in the Discharge Summary.  Based upon the clinical findings, risk factors, and treatment, can the relationship between these conditions be further specified? Please specify which applies:     ·         Acute respiratory failure is due to the sepsis (severe sepsis)  ·         Acute respiratory failure is NOT due to the sepsis  ·         Other explanation of clinical findings (please document)  ·         Unable to determine       The medical record reflects the following:    Clinical Findings  H/P:  Blood pressure 154/77 initially, she is currently 95/60, pulse    110, temperature is 102.8, respiratory rate in the high 20s.  LABORATORY DATA:  White count 16, hemoglobin 8, hematocrit 29, mean cell volume is 70, platelets 339, neutrophils 81%, lymphocytes 12%.  Sodium 134, potassium 2.9, chloride 100, bicarbonate 23, glucose 141, BUN less than 5, creatinine 0.9, calcium 8.0, AST 39, ALT 23, alkaline phosphatase 77, total bilirubin 0.8, albumin 3.3, total protein is 6.9.  Urinalysis is negative. Influenza is negative.  Lactic acid initially 3.68, currently 1.8.   Treatment   Oxygen, IV antibiotic, fluids   Risk Factors  Sepsis, Pneumonia, Acute respiratory failure with hyoxia, pulmonary sarcoidosis    Location within medical record   Discharge Summary, H&P       Thank you,   Rocio Travis, CCS, CCS-P  [email or web address]

## 2017-06-14 NOTE — ADDENDUM NOTE
Encounter addended by: Rocio Travis on: 6/14/2017  6:40 AM<BR>     Documentation filed: Clinical Notes

## 2017-06-28 NOTE — ADDENDUM NOTE
Encounter addended by: Kim Zuñiga D.O. on: 6/28/2017  7:37 AM<BR>     Documentation filed: Clinical Notes, Fast Note

## 2017-10-17 NOTE — ADDENDUM NOTE
Encounter addended by: Alexia Reyes on: 10/17/2017 11:06 AM<BR>    Actions taken: Flowsheet accepted

## 2018-01-25 ENCOUNTER — RESOLUTE PROFESSIONAL BILLING HOSPITAL PROF FEE (OUTPATIENT)
Dept: HOSPITALIST | Facility: MEDICAL CENTER | Age: 48
End: 2018-01-25
Payer: COMMERCIAL

## 2018-01-25 ENCOUNTER — HOSPITAL ENCOUNTER (INPATIENT)
Facility: MEDICAL CENTER | Age: 48
LOS: 3 days | DRG: 871 | End: 2018-01-28
Attending: EMERGENCY MEDICINE | Admitting: FAMILY MEDICINE
Payer: COMMERCIAL

## 2018-01-25 ENCOUNTER — APPOINTMENT (OUTPATIENT)
Dept: RADIOLOGY | Facility: MEDICAL CENTER | Age: 48
DRG: 871 | End: 2018-01-25
Attending: EMERGENCY MEDICINE
Payer: COMMERCIAL

## 2018-01-25 DIAGNOSIS — R79.89 ABNORMAL LFTS: ICD-10-CM

## 2018-01-25 DIAGNOSIS — R05.9 COUGH: ICD-10-CM

## 2018-01-25 DIAGNOSIS — R10.11 RIGHT UPPER QUADRANT ABDOMINAL PAIN: ICD-10-CM

## 2018-01-25 DIAGNOSIS — D86.9 SARCOIDOSIS: ICD-10-CM

## 2018-01-25 DIAGNOSIS — J06.9 UPPER RESPIRATORY TRACT INFECTION, UNSPECIFIED TYPE: ICD-10-CM

## 2018-01-25 DIAGNOSIS — E87.20 LACTIC ACIDOSIS: ICD-10-CM

## 2018-01-25 DIAGNOSIS — E87.29 INCREASED ANION GAP METABOLIC ACIDOSIS: ICD-10-CM

## 2018-01-25 DIAGNOSIS — E86.0 DEHYDRATION: ICD-10-CM

## 2018-01-25 DIAGNOSIS — R00.0 TACHYCARDIA: ICD-10-CM

## 2018-01-25 PROBLEM — J20.9 ACUTE BRONCHITIS: Status: ACTIVE | Noted: 2018-01-25

## 2018-01-25 LAB
ALBUMIN SERPL BCP-MCNC: 4 G/DL (ref 3.2–4.9)
ALBUMIN/GLOB SERPL: 1 G/DL
ALP SERPL-CCNC: 131 U/L (ref 30–99)
ALT SERPL-CCNC: 117 U/L (ref 2–50)
ANION GAP SERPL CALC-SCNC: 14 MMOL/L (ref 0–11.9)
APAP SERPL-MCNC: <10 UG/ML (ref 10–30)
APPEARANCE UR: ABNORMAL
APTT PPP: 24.7 SEC (ref 24.7–36)
AST SERPL-CCNC: 388 U/L (ref 12–45)
BACTERIA #/AREA URNS HPF: ABNORMAL /HPF
BASOPHILS # BLD AUTO: 1.5 % (ref 0–1.8)
BASOPHILS # BLD: 0.15 K/UL (ref 0–0.12)
BILIRUB SERPL-MCNC: 2.4 MG/DL (ref 0.1–1.5)
BILIRUB UR QL STRIP.AUTO: ABNORMAL
BNP SERPL-MCNC: 27 PG/ML (ref 0–100)
BUN SERPL-MCNC: 8 MG/DL (ref 8–22)
CALCIUM SERPL-MCNC: 9 MG/DL (ref 8.4–10.2)
CHLORIDE SERPL-SCNC: 99 MMOL/L (ref 96–112)
CHOLEST SERPL-MCNC: 263 MG/DL (ref 100–199)
CO2 SERPL-SCNC: 22 MMOL/L (ref 20–33)
COLOR UR: ABNORMAL
COMMENT 1642: NORMAL
CREAT SERPL-MCNC: 0.91 MG/DL (ref 0.5–1.4)
CRP SERPL HS-MCNC: 10 MG/L (ref 0–7.5)
DEPRECATED D DIMER PPP IA-ACNC: 226 NG/ML(D-DU)
EOSINOPHIL # BLD AUTO: 0.33 K/UL (ref 0–0.51)
EOSINOPHIL NFR BLD: 3.3 % (ref 0–6.9)
EPI CELLS #/AREA URNS HPF: ABNORMAL /HPF
ERYTHROCYTE [DISTWIDTH] IN BLOOD BY AUTOMATED COUNT: 65.7 FL (ref 35.9–50)
FLUAV RNA SPEC QL NAA+PROBE: NEGATIVE
FLUAV+FLUBV AG SPEC QL IA: NORMAL
FLUBV RNA SPEC QL NAA+PROBE: NEGATIVE
GLOBULIN SER CALC-MCNC: 3.9 G/DL (ref 1.9–3.5)
GLUCOSE SERPL-MCNC: 121 MG/DL (ref 65–99)
GLUCOSE UR STRIP.AUTO-MCNC: ABNORMAL MG/DL
HAV IGM SERPL QL IA: NEGATIVE
HBV CORE IGM SER QL: NEGATIVE
HBV SURFACE AG SER QL: NEGATIVE
HCG SERPL QL: NEGATIVE
HCT VFR BLD AUTO: 34.7 % (ref 37–47)
HCV AB SER QL: NEGATIVE
HDLC SERPL-MCNC: 30 MG/DL
HGB BLD-MCNC: 11.2 G/DL (ref 12–16)
HYPOCHROMIA BLD QL SMEAR: ABNORMAL
IMM GRANULOCYTES # BLD AUTO: 0.05 K/UL (ref 0–0.11)
IMM GRANULOCYTES NFR BLD AUTO: 0.5 % (ref 0–0.9)
INR PPP: 0.96 (ref 0.87–1.13)
KETONES UR STRIP.AUTO-MCNC: ABNORMAL MG/DL
LACTATE BLD-SCNC: 3.01 MMOL/L (ref 0.5–2)
LACTATE BLD-SCNC: 4.91 MMOL/L (ref 0.5–2)
LDLC SERPL CALC-MCNC: 198 MG/DL
LEUKOCYTE ESTERASE UR QL STRIP.AUTO: NEGATIVE
LIPASE SERPL-CCNC: 30 U/L (ref 7–58)
LYMPHOCYTES # BLD AUTO: 2.87 K/UL (ref 1–4.8)
LYMPHOCYTES NFR BLD: 28.7 % (ref 22–41)
MCH RBC QN AUTO: 26.2 PG (ref 27–33)
MCHC RBC AUTO-ENTMCNC: 32.3 G/DL (ref 33.6–35)
MCV RBC AUTO: 81.1 FL (ref 81.4–97.8)
MICRO URNS: ABNORMAL
MONOCYTES # BLD AUTO: 0.63 K/UL (ref 0–0.85)
MONOCYTES NFR BLD AUTO: 6.3 % (ref 0–13.4)
MUCOUS THREADS #/AREA URNS HPF: ABNORMAL /HPF
NEUTROPHILS # BLD AUTO: 5.98 K/UL (ref 2–7.15)
NEUTROPHILS NFR BLD: 59.7 % (ref 44–72)
NITRITE UR QL STRIP.AUTO: ABNORMAL
NRBC # BLD AUTO: 0.02 K/UL
NRBC BLD-RTO: 0.2 /100 WBC
PH UR STRIP.AUTO: 6.5 [PH]
PLATELET # BLD AUTO: 310 K/UL (ref 164–446)
PLATELET BLD QL SMEAR: NORMAL
PMV BLD AUTO: 10.8 FL (ref 9–12.9)
POIKILOCYTOSIS BLD QL SMEAR: NORMAL
POLYCHROMASIA BLD QL SMEAR: NORMAL
POTASSIUM SERPL-SCNC: 3.5 MMOL/L (ref 3.6–5.5)
PROT SERPL-MCNC: 7.9 G/DL (ref 6–8.2)
PROT UR QL STRIP: ABNORMAL MG/DL
PROTHROMBIN TIME: 12.4 SEC (ref 12–14.6)
RBC # BLD AUTO: 4.28 M/UL (ref 4.2–5.4)
RBC # URNS HPF: >150 /HPF
RBC BLD AUTO: PRESENT
RBC UR QL AUTO: ABNORMAL
SIGNIFICANT IND 70042: NORMAL
SITE SITE: NORMAL
SODIUM SERPL-SCNC: 135 MMOL/L (ref 135–145)
SOURCE SOURCE: NORMAL
SP GR UR STRIP.AUTO: 1.02
SPECIMEN DRAWN FROM PATIENT: ABNORMAL
SPECIMEN DRAWN FROM PATIENT: ABNORMAL
TARGETS BLD QL SMEAR: NORMAL
TRIGL SERPL-MCNC: 177 MG/DL (ref 0–149)
TROPONIN I SERPL-MCNC: <0.02 NG/ML (ref 0–0.04)
TSH SERPL-ACNC: 1.12 UIU/ML (ref 0.35–5.5)
WBC # BLD AUTO: 10 K/UL (ref 4.8–10.8)
WBC #/AREA URNS HPF: ABNORMAL /HPF

## 2018-01-25 PROCEDURE — 96361 HYDRATE IV INFUSION ADD-ON: CPT

## 2018-01-25 PROCEDURE — 83690 ASSAY OF LIPASE: CPT

## 2018-01-25 PROCEDURE — 94640 AIRWAY INHALATION TREATMENT: CPT

## 2018-01-25 PROCEDURE — A9270 NON-COVERED ITEM OR SERVICE: HCPCS | Performed by: FAMILY MEDICINE

## 2018-01-25 PROCEDURE — A9270 NON-COVERED ITEM OR SERVICE: HCPCS | Performed by: EMERGENCY MEDICINE

## 2018-01-25 PROCEDURE — 83880 ASSAY OF NATRIURETIC PEPTIDE: CPT

## 2018-01-25 PROCEDURE — 700111 HCHG RX REV CODE 636 W/ 250 OVERRIDE (IP): Performed by: EMERGENCY MEDICINE

## 2018-01-25 PROCEDURE — 85610 PROTHROMBIN TIME: CPT

## 2018-01-25 PROCEDURE — 36415 COLL VENOUS BLD VENIPUNCTURE: CPT

## 2018-01-25 PROCEDURE — 94760 N-INVAS EAR/PLS OXIMETRY 1: CPT

## 2018-01-25 PROCEDURE — 700102 HCHG RX REV CODE 250 W/ 637 OVERRIDE(OP): Performed by: FAMILY MEDICINE

## 2018-01-25 PROCEDURE — 84443 ASSAY THYROID STIM HORMONE: CPT

## 2018-01-25 PROCEDURE — 83605 ASSAY OF LACTIC ACID: CPT | Mod: 91

## 2018-01-25 PROCEDURE — 96374 THER/PROPH/DIAG INJ IV PUSH: CPT

## 2018-01-25 PROCEDURE — 87502 INFLUENZA DNA AMP PROBE: CPT

## 2018-01-25 PROCEDURE — 93306 TTE W/DOPPLER COMPLETE: CPT

## 2018-01-25 PROCEDURE — 76705 ECHO EXAM OF ABDOMEN: CPT

## 2018-01-25 PROCEDURE — 87400 INFLUENZA A/B EACH AG IA: CPT

## 2018-01-25 PROCEDURE — 80307 DRUG TEST PRSMV CHEM ANLYZR: CPT

## 2018-01-25 PROCEDURE — 99285 EMERGENCY DEPT VISIT HI MDM: CPT

## 2018-01-25 PROCEDURE — 85025 COMPLETE CBC W/AUTO DIFF WBC: CPT

## 2018-01-25 PROCEDURE — 99223 1ST HOSP IP/OBS HIGH 75: CPT | Performed by: FAMILY MEDICINE

## 2018-01-25 PROCEDURE — 93005 ELECTROCARDIOGRAM TRACING: CPT | Performed by: EMERGENCY MEDICINE

## 2018-01-25 PROCEDURE — 85730 THROMBOPLASTIN TIME PARTIAL: CPT

## 2018-01-25 PROCEDURE — 93306 TTE W/DOPPLER COMPLETE: CPT | Mod: 26 | Performed by: INTERNAL MEDICINE

## 2018-01-25 PROCEDURE — 700111 HCHG RX REV CODE 636 W/ 250 OVERRIDE (IP): Performed by: HOSPITALIST

## 2018-01-25 PROCEDURE — 87040 BLOOD CULTURE FOR BACTERIA: CPT | Mod: 91

## 2018-01-25 PROCEDURE — 700105 HCHG RX REV CODE 258: Performed by: HOSPITALIST

## 2018-01-25 PROCEDURE — 770006 HCHG ROOM/CARE - MED/SURG/GYN SEMI*

## 2018-01-25 PROCEDURE — 700105 HCHG RX REV CODE 258: Performed by: FAMILY MEDICINE

## 2018-01-25 PROCEDURE — 80074 ACUTE HEPATITIS PANEL: CPT

## 2018-01-25 PROCEDURE — 700105 HCHG RX REV CODE 258: Performed by: EMERGENCY MEDICINE

## 2018-01-25 PROCEDURE — 700102 HCHG RX REV CODE 250 W/ 637 OVERRIDE(OP): Performed by: EMERGENCY MEDICINE

## 2018-01-25 PROCEDURE — 86141 C-REACTIVE PROTEIN HS: CPT

## 2018-01-25 PROCEDURE — 84703 CHORIONIC GONADOTROPIN ASSAY: CPT

## 2018-01-25 PROCEDURE — 84484 ASSAY OF TROPONIN QUANT: CPT | Mod: 91

## 2018-01-25 PROCEDURE — 81001 URINALYSIS AUTO W/SCOPE: CPT

## 2018-01-25 PROCEDURE — 80061 LIPID PANEL: CPT

## 2018-01-25 PROCEDURE — 85379 FIBRIN DEGRADATION QUANT: CPT

## 2018-01-25 PROCEDURE — 700101 HCHG RX REV CODE 250: Performed by: EMERGENCY MEDICINE

## 2018-01-25 PROCEDURE — 71045 X-RAY EXAM CHEST 1 VIEW: CPT

## 2018-01-25 PROCEDURE — 80053 COMPREHEN METABOLIC PANEL: CPT

## 2018-01-25 RX ORDER — IPRATROPIUM BROMIDE AND ALBUTEROL SULFATE 2.5; .5 MG/3ML; MG/3ML
3 SOLUTION RESPIRATORY (INHALATION)
Status: DISCONTINUED | OUTPATIENT
Start: 2018-01-25 | End: 2018-01-28 | Stop reason: HOSPADM

## 2018-01-25 RX ORDER — GUAIFENESIN/DEXTROMETHORPHAN 100-10MG/5
10 SYRUP ORAL EVERY 6 HOURS PRN
Status: DISCONTINUED | OUTPATIENT
Start: 2018-01-25 | End: 2018-01-28 | Stop reason: HOSPADM

## 2018-01-25 RX ORDER — FUROSEMIDE 40 MG/1
40 TABLET ORAL DAILY
Status: DISCONTINUED | OUTPATIENT
Start: 2018-01-25 | End: 2018-01-25

## 2018-01-25 RX ORDER — HYDROCODONE BITARTRATE AND ACETAMINOPHEN 5; 325 MG/1; MG/1
1-2 TABLET ORAL EVERY 4 HOURS PRN
Status: DISCONTINUED | OUTPATIENT
Start: 2018-01-25 | End: 2018-01-28 | Stop reason: HOSPADM

## 2018-01-25 RX ORDER — LABETALOL HYDROCHLORIDE 5 MG/ML
10 INJECTION, SOLUTION INTRAVENOUS EVERY 4 HOURS PRN
Status: DISCONTINUED | OUTPATIENT
Start: 2018-01-25 | End: 2018-01-28 | Stop reason: HOSPADM

## 2018-01-25 RX ORDER — AZITHROMYCIN 250 MG/1
500 TABLET, FILM COATED ORAL ONCE
Status: COMPLETED | OUTPATIENT
Start: 2018-01-25 | End: 2018-01-25

## 2018-01-25 RX ORDER — SUMATRIPTAN 25 MG/1
25-100 TABLET, FILM COATED ORAL
Status: DISCONTINUED | OUTPATIENT
Start: 2018-01-25 | End: 2018-01-25

## 2018-01-25 RX ORDER — FERROUS SULFATE 325(65) MG
325 TABLET ORAL DAILY
Status: DISCONTINUED | OUTPATIENT
Start: 2018-01-25 | End: 2018-01-28 | Stop reason: HOSPADM

## 2018-01-25 RX ORDER — BUDESONIDE AND FORMOTEROL FUMARATE DIHYDRATE 160; 4.5 UG/1; UG/1
2 AEROSOL RESPIRATORY (INHALATION)
Status: DISCONTINUED | OUTPATIENT
Start: 2018-01-25 | End: 2018-01-25

## 2018-01-25 RX ORDER — DEXAMETHASONE SODIUM PHOSPHATE 10 MG/ML
10 INJECTION, SOLUTION INTRAMUSCULAR; INTRAVENOUS ONCE
Status: COMPLETED | OUTPATIENT
Start: 2018-01-25 | End: 2018-01-25

## 2018-01-25 RX ORDER — ONDANSETRON 2 MG/ML
4 INJECTION INTRAMUSCULAR; INTRAVENOUS EVERY 4 HOURS PRN
Status: DISCONTINUED | OUTPATIENT
Start: 2018-01-25 | End: 2018-01-28 | Stop reason: HOSPADM

## 2018-01-25 RX ORDER — CEFTRIAXONE 2 G/1
2 INJECTION, POWDER, FOR SOLUTION INTRAMUSCULAR; INTRAVENOUS ONCE
Status: DISCONTINUED | OUTPATIENT
Start: 2018-01-25 | End: 2018-01-25

## 2018-01-25 RX ORDER — SODIUM CHLORIDE 9 MG/ML
1000 INJECTION, SOLUTION INTRAVENOUS ONCE
Status: COMPLETED | OUTPATIENT
Start: 2018-01-25 | End: 2018-01-25

## 2018-01-25 RX ORDER — IPRATROPIUM BROMIDE AND ALBUTEROL SULFATE 2.5; .5 MG/3ML; MG/3ML
3 SOLUTION RESPIRATORY (INHALATION) ONCE
Status: COMPLETED | OUTPATIENT
Start: 2018-01-25 | End: 2018-01-25

## 2018-01-25 RX ORDER — SODIUM CHLORIDE 9 MG/ML
INJECTION, SOLUTION INTRAVENOUS CONTINUOUS
Status: DISCONTINUED | OUTPATIENT
Start: 2018-01-25 | End: 2018-01-26

## 2018-01-25 RX ORDER — ALBUTEROL SULFATE 90 UG/1
2 AEROSOL, METERED RESPIRATORY (INHALATION) EVERY 4 HOURS PRN
COMMUNITY

## 2018-01-25 RX ORDER — IBUPROFEN 600 MG/1
600 TABLET ORAL EVERY 6 HOURS PRN
Status: DISCONTINUED | OUTPATIENT
Start: 2018-01-25 | End: 2018-01-28 | Stop reason: HOSPADM

## 2018-01-25 RX ORDER — BUDESONIDE AND FORMOTEROL FUMARATE DIHYDRATE 160; 4.5 UG/1; UG/1
2 AEROSOL RESPIRATORY (INHALATION) 2 TIMES DAILY
Status: DISCONTINUED | OUTPATIENT
Start: 2018-01-25 | End: 2018-01-28 | Stop reason: HOSPADM

## 2018-01-25 RX ORDER — ZOLPIDEM TARTRATE 5 MG/1
5 TABLET ORAL
Status: DISCONTINUED | OUTPATIENT
Start: 2018-01-25 | End: 2018-01-28 | Stop reason: HOSPADM

## 2018-01-25 RX ORDER — SODIUM CHLORIDE 9 MG/ML
30 INJECTION, SOLUTION INTRAVENOUS
Status: COMPLETED | OUTPATIENT
Start: 2018-01-25 | End: 2018-01-25

## 2018-01-25 RX ORDER — SUMATRIPTAN 25 MG/1
25 TABLET, FILM COATED ORAL
Status: DISCONTINUED | OUTPATIENT
Start: 2018-01-25 | End: 2018-01-28 | Stop reason: HOSPADM

## 2018-01-25 RX ADMIN — AZITHROMYCIN 500 MG: 500 INJECTION, POWDER, LYOPHILIZED, FOR SOLUTION INTRAVENOUS at 22:57

## 2018-01-25 RX ADMIN — IPRATROPIUM BROMIDE AND ALBUTEROL SULFATE 3 ML: .5; 3 SOLUTION RESPIRATORY (INHALATION) at 05:22

## 2018-01-25 RX ADMIN — SODIUM CHLORIDE 3276 ML: 9 INJECTION, SOLUTION INTRAVENOUS at 05:29

## 2018-01-25 RX ADMIN — DEXAMETHASONE SODIUM PHOSPHATE 10 MG: 10 INJECTION, SOLUTION INTRAMUSCULAR; INTRAVENOUS at 05:14

## 2018-01-25 RX ADMIN — HYDROCODONE BITARTRATE AND ACETAMINOPHEN 1 TABLET: 5; 325 TABLET ORAL at 11:34

## 2018-01-25 RX ADMIN — AZITHROMYCIN 500 MG: 250 TABLET, FILM COATED ORAL at 05:14

## 2018-01-25 RX ADMIN — HYDROCODONE BITARTRATE AND ACETAMINOPHEN 2 TABLET: 5; 325 TABLET ORAL at 21:19

## 2018-01-25 RX ADMIN — BUDESONIDE AND FORMOTEROL FUMARATE DIHYDRATE 2 PUFF: 160; 4.5 AEROSOL RESPIRATORY (INHALATION) at 11:58

## 2018-01-25 RX ADMIN — SODIUM CHLORIDE 1000 ML: 9 INJECTION, SOLUTION INTRAVENOUS at 21:19

## 2018-01-25 RX ADMIN — SODIUM CHLORIDE: 9 INJECTION, SOLUTION INTRAVENOUS at 11:37

## 2018-01-25 ASSESSMENT — PAIN SCALES - GENERAL
PAINLEVEL_OUTOF10: 7
PAINLEVEL_OUTOF10: 4
PAINLEVEL_OUTOF10: 6
PAINLEVEL_OUTOF10: 6

## 2018-01-25 ASSESSMENT — COPD QUESTIONNAIRES
COPD SCREENING SCORE: 4
DURING THE PAST 4 WEEKS HOW MUCH DID YOU FEEL SHORT OF BREATH: MOST  OR ALL OF THE TIME
DO YOU EVER COUGH UP ANY MUCUS OR PHLEGM?: NO/ONLY WITH OCCASIONAL COLDS OR INFECTIONS
HAVE YOU SMOKED AT LEAST 100 CIGARETTES IN YOUR ENTIRE LIFE: YES

## 2018-01-25 ASSESSMENT — LIFESTYLE VARIABLES
EVER_SMOKED: YES
ALCOHOL_USE: NO
EVER_SMOKED: YES

## 2018-01-25 NOTE — FLOWSHEET NOTE
01/25/18 0523   Events/Summary/Plan   Non-Invasive Resp Device Site Inspection Completed Intact   Interdisciplinary Plan of Care-Goals (Indications)   Obstructive Ventilatory Defect or Pulmonary Disease without Obvious Obstruction Strong Subjective / Objective Improvement   Interdisciplinary Plan of Care-Outcomes    Bronchodilator Outcome Patient at Stable Baseline   Education   Education Yes - Pt. / Family has been Instructed in use of Respiratory Equipment;Yes - Pt. / Family has been Instructed in use of Respiratory Medications and Adverse Reactions   RT Assessment of Delivered Medications   Evaluation of Medication Delivery Daily Yes-- Pt /Family has been Instructed in use of Respiratory Medications and Adverse Reactions   SVN Group   #SVN Performed Yes   Given By: Mouthpiece   Date SVN Last Changed 01/25/18   Date SVN Next Change Due (Q 7 Days) 02/01/18   Respiratory WDL   Respiratory (WDL) X   Chest Exam   Work Of Breathing / Effort Mild   Respiration 18   Pulse (!) 111   Heart Rate (Monitored) (!) 110   Breath Sounds   Pre/Post Intervention Pre Intervention Assessment   RUL Breath Sounds Clear   RML Breath Sounds Clear   RLL Breath Sounds Diminished   TENZIN Breath Sounds Clear   LLL Breath Sounds Diminished   Secretions   Cough Non Productive   Oximetry   #Pulse Oximetry (Single Determination) Yes   Oxygen   Home O2 Use Prior To Admission? No   Pulse Oximetry 94 %   O2 (LPM) 5   O2 Daily Delivery Respiratory  Silicone Nasal Cannula      Yes

## 2018-01-25 NOTE — PROGRESS NOTES
Dr. singletary made aware pt has lasix ordered since 0900. Pt arrived on [facility] around 1122. Clarified lasix order. Hold lasix for now and wait for new orders, per Md.

## 2018-01-25 NOTE — H&P
HPI:      Patient is a 47 year old female who comes to the ER with cough, shortness of breath and chest pain that has been ongoing for 1 month. The patient's cough is productive and intermittent. No noted aggravating factors noted. No alleviation with home rescue inhaler. The patient has had one similar episode to this in the past, she was admitted to the hospital approximately 7 months ago with sepsis secondary to pneumonia. She has a past medical history sarcoidosis, but is not on any controller medications or daily medications other than a multivitamin at this time. She has occasional associated retrosternal nonradiating chest pressure that started, as well as total body aches up and down her entire body. Her cough is productive of occasional mucous but never bloody. Her symptoms have steadily been worsening.  In the ER, lactic acid is elevated but WBC is normal and she is afebrile. LFTs are elevated. Gallbladder US only shows a fatty liver. Chest x-ray is negative. D-dimer is negative.      ROS: Positive for chest pain, dyspnea, productive cough. All other ROS are negative.        PAST MEDICAL HISTORY   has a past medical history of Anemia and Sarcoidosis (CMS-HCC).     PAST FAMILY HISTORY  She reports a history of diabetes and multiple blood clots in her father.     SOCIAL HISTORY  Social History           Social History Main Topics   • Smoking status: Former Smoker   • Smokeless tobacco: Not on file   • Alcohol use Yes   • Drug use: No   • Sexual activity: Not on file         SURGICAL HISTORY  patient denies any surgical history       No current facility-administered medications on file prior to encounter.      Current Outpatient Prescriptions on File Prior to Encounter   Medication Sig Dispense Refill   • furosemide (LASIX) 40 MG Tab Take 1 Tab by mouth every day. 30 Tab 0   • albuterol 108 (90 BASE) MCG/ACT Aero Soln inhalation aerosol Inhale 2 Puffs by mouth every 6 hours as needed for Shortness of Breath.  8.5 g 1   • fluticasone-salmeterol (ADVAIR) 250-50 MCG/DOSE AEROSOL POWDER, BREATH ACTIVATED Inhale 1 Puff by mouth every 12 hours. 1 Inhaler 1   • ferrous sulfate 325 (65 FE) MG tablet Take 1 Tab by mouth every day. 30 Tab 1   • SUMAtriptan (IMITREX) 25 MG Tab tablet Take 1-4 Tabs by mouth Once PRN for Migraine for up to 1 dose. 10 Tab 0   • ondansetron (ZOFRAN ODT) 4 MG TABLET DISPERSIBLE Take 1 Tab by mouth every 8 hours as needed for Nausea/Vomiting. 10 Tab 0   • hydrocodone-acetaminophen (NORCO) 5-325 MG Tab per tablet Take 1-2 Tabs by mouth every four hours as needed (mild pain). 20 Tab 0       ALLERGIES  No Known Allergies        Physical Exam:  Gen: NAD, lying comfortably in bed  HEENT: NC/AT, MMM  Chest: symmetrical expansion, no costochondral tenderness on palpation  Lungs: CTA B/L, no w/r/r  CV: +S1, S2, RRR  Abdomen: S/NT/ND, + BS x 4  Ext: no c/c/e, FROM x 4    Skin: dry, warm to touch   Neuro: CN II - XII intact, no focal motor or sensory deficits noted  Psych: A+O x 3, judgment is intact        Labs (CBC): Last 72 Hours       01/25/18   0440    WBC  10.0    Neutrophils-Polys  59.70    Basophils  1.50    RBC  4.28    Hemoglobin  11.2    Hematocrit  34.7    MCV  81.1    MCH  26.2    MCHC  32.3    Platelet Count  310    RDW  65.7    MPV  10.8    Neutrophils (Absolute)  5.98    Lymphs (Absolute)  2.87           - Comment  - Low  - High    Labs (Metabolic): Last 72 Hours       01/25/18   0440    Sodium  135    Potassium  3.5    Chloride  99    Co2  22    Glucose  121    Bun  8    Creatinine  0.91    Calcium  9.0    AST(SGOT)  388    ALT(SGPT)  117    Alkaline Phosphatase  131    Total Bilirubin  2.4    Albumin  4.0    Total Protein  7.9    Globulin  3.9    A-G Ratio  1.0    GFR If African American  >60    GFR If Non African American  >60    Anion Gap  14.0           - Low  - High     Labs (Additional): Last 72 Hours       01/25/18   0440    INR  0.96    Troponin I  <0.02    Magnesium      Phosphorus       Lactic Acid  3.01          1/25/2018 4:31 AM    HISTORY/REASON FOR EXAM: Sepsis    TECHNIQUE/EXAM DESCRIPTION:  Single AP view of the chest.    COMPARISON: June 7, 2017    FINDINGS:    The cardiac silhouette appears within normal limits.    The mediastinal contour appears within normal limits.  The central pulmonary vasculature appears normal.    The lungs appear well expanded bilaterally.  Bilateral lungs are clear.    No significant pleural effusions are identified.    The bony structures appear age-appropriate.   Impression         1.  No acute cardiopulmonary disease.           1/25/2018 6:10 AM    HISTORY/REASON FOR EXAM: Right upper quadrant abdominal pain.    TECHNIQUE/EXAM DESCRIPTION AND NUMBER OF VIEWS:  Real-time sonography of the gallbladder.    COMPARISON: None    FINDINGS:    There is no ascites.    The liver is normal in contour. There is no evidence of solid mass lesion. The liver measures 19.83 cm. The liver is echogenic.    The gallbladder is normal. There is no evidence of cholelithiasis. The gallbladder wall thickness measures 0.23 cm  There is no pericholecystic fluid.    The common duct measures 0.29 cm.    The visualized pancreas is unremarkable.  The visualized aorta is normal in caliber.    Intrahepatic IVC is patent.    The portal vein is patent with hepatopetal flow.    The right kidney measures 13.48 cm.   Impression         1.  Hepatomegaly and echogenic liver, compatible with fatty change versus fibrosis.           Assessment/Plan:      Acute bronchitis  Shortness of breath  Elevated LFTs  Lactic acidosis  Chest pain  Sarcoidosis  Anemia      Patient with productive cough and shortness of breath but no evidence of pneumonia on CXR. Continue IV Rocephin and Zithromax  Duoneb tx prn SOB. Considering hx of sarcoidosis, check Echo to evaluate ventricular function  LFTs are elevated but gallbladder US only shows fatty liver. There is no cholecystitis or bile duct obstruction seen. Check  hepatitis panel  Lactic acid is elevated. Started on IV fluids. Monitor lactic acid level  Initial troponin is negative and she has no chest pain at this time. Monitor serial troponins

## 2018-01-25 NOTE — PROGRESS NOTES
Pt arrived on [facility], no signs of distress. Denies CP or SOB. Pt complains of lower back pain and describes it as sharp pins and needles. POC discussed. Safety measures in place.

## 2018-01-25 NOTE — ED NOTES
ERP at bedside. Pt agrees with plan of care discussed by ERP. AIDET acknowledged with patient. Glenna in low position, side rail up for pt safety. Call light within reach. Will continue to monitor.

## 2018-01-25 NOTE — ED NOTES
".  Chief Complaint   Patient presents with   • Cough     for the last month, with symptoms exacerbating to chest pain described as \"an elephant sitting on my chest\" and pain to upper torso described as \"needles all over my body\" and pain to \"vaginal area\" pt has been monitoring these symtoms for the last month without any relief.        .Blood pressure 152/87, pulse (!) 124, temperature 36.9 °C (98.4 °F), resp. rate 18, height 1.702 m (5' 7\"), weight 109.2 kg (240 lb 11.9 oz).      "

## 2018-01-25 NOTE — ED PROVIDER NOTES
"ED Provider Note    CHIEF COMPLAINT  Chief Complaint   Patient presents with   • Cough     for the last month, with symptoms exacerbating to chest pain described as \"an elephant sitting on my chest\" and pain to upper torso described as \"needles all over my body\" and pain to \"vaginal area\" pt has been monitoring these symtoms for the last month without any relief.        HPI    Primary care provider: Edis Rivas M.D.  Means of arrival: POV  History obtained from: patient  History limited by: nothing    Joann Deshpande is a 47 y.o. female who presents with cough with associated dyspnea and chest pain present for 1 month. The patient's cough is productive and intermittent. No noted aggravating factors noted. No alleviation with home rescue inhaler. The patient has had one similar episode to this in the past, she was admitted to the hospital approximately 7 months ago with sepsis secondary to pneumonia. She has a past medical history sarcoidosis, but is not on any controller medications or daily medications other than a multivitamin at this time. She has occasional associated retrosternal nonradiating chest pressure that started, as well as total body aches up and down her entire body. Her cough is productive of occasional mucous but never bloody. Her symptoms have steadily been worsening.     REVIEW OF SYSTEMS  Constitutional: Negative for fever and positive for occasional chills.  HENT: Negative for nosebleeds or sore throat.    Eyes: Negative for vision changes or discharge.   Respiratory: Positive for cough and shortness of breath.    Cardiovascular: Negative for chest pain or palpitations.   Gastrointestinal: Negative for nausea, vomiting, or diarrhea. Occasional abdominal pain.  Genitourinary: Negative for dysuria, frequency, or flank pain.   Musculoskeletal: Positive body aches, worse when coughing.  Skin: Negative for itching or rash.   Neurological: Negative for sensory or motor changes. " "  Endo/Heme/Allergies: Negative for weight changes or hives.   Psychiatric/Behavioral: Negative for depression or suicidal ideas.     PAST MEDICAL HISTORY   has a past medical history of Anemia and Sarcoidosis (CMS-HCC).    PAST FAMILY HISTORY  She reports a history of diabetes and multiple blood clots in her father.    SOCIAL HISTORY  Social History     Social History Main Topics   • Smoking status: Former Smoker   • Smokeless tobacco: Not on file   • Alcohol use Yes   • Drug use: No   • Sexual activity: Not on file       SURGICAL HISTORY  patient denies any surgical history    CURRENT MEDICATIONS  Home Medications    **Home medications have not yet been reviewed for this encounter**       ALLERGIES  No Known Allergies    PHYSICAL EXAM  VITAL SIGNS: /87   Pulse (!) 111   Temp 36.9 °C (98.4 °F)   Resp 18   Ht 1.702 m (5' 7\")   Wt 109.2 kg (240 lb 11.9 oz)   SpO2 94%   BMI 37.71 kg/m²     Pulse ox interpretation: On room air, I interpret this pulse ox as normal.  Constitutional: Well developed, well nourished. No acute distress.  HEENT: Normocephalic, atraumatic. Posterior pharynx clear, mucous membranes dry.  Eyes:  EOMI. Normal sclera.  Neck: Supple, Full range of motion, nontender.  Chest/Pulmonary: Clear to ausculation bilaterally, no wheezes or rhonchi, prolonged expiratory phase and dry coughing fits during examination.  Cardiovascular: Tachycardic rate, regular rhythm, no murmur.  Abdomen: Soft, mildly tender right upper quadrant, no rebound, guarding, or masses.  Back: No CVA tenderness, nontender midline, no step offs.  Musculoskeletal: No deformity, no edema.  Neuro: Clear speech, normal coordination, cranial nerves II-XII grossly intact.  Psych: Normal mood and affect.  Skin: No rashes, warm and dry.    DIAGNOSTIC STUDIES / PROCEDURES    LABS & EKG  Results for orders placed or performed during the hospital encounter of 01/25/18   HCG QUAL SERUM   Result Value Ref Range    Beta-Hcg Qualitative " Serum Negative Negative   CBC WITH DIFFERENTIAL   Result Value Ref Range    WBC 10.0 4.8 - 10.8 K/uL    RBC 4.28 4.20 - 5.40 M/uL    Hemoglobin 11.2 (L) 12.0 - 16.0 g/dL    Hematocrit 34.7 (L) 37.0 - 47.0 %    MCV 81.1 (L) 81.4 - 97.8 fL    MCH 26.2 (L) 27.0 - 33.0 pg    MCHC 32.3 (L) 33.6 - 35.0 g/dL    RDW 65.7 (H) 35.9 - 50.0 fL    Platelet Count 310 164 - 446 K/uL    MPV 10.8 9.0 - 12.9 fL    Neutrophils-Polys 59.70 44.00 - 72.00 %    Lymphocytes 28.70 22.00 - 41.00 %    Monocytes 6.30 0.00 - 13.40 %    Eosinophils 3.30 0.00 - 6.90 %    Basophils 1.50 0.00 - 1.80 %    Immature Granulocytes 0.50 0.00 - 0.90 %    Nucleated RBC 0.20 /100 WBC    Lymphs (Absolute) 2.87 1.00 - 4.80 K/uL    Monos (Absolute) 0.63 0.00 - 0.85 K/uL    Eos (Absolute) 0.33 0.00 - 0.51 K/uL    Baso (Absolute) 0.15 (H) 0.00 - 0.12 K/uL    Immature Granulocytes (abs) 0.05 0.00 - 0.11 K/uL    NRBC (Absolute) 0.02 K/uL    Neutrophils (Absolute) 5.98 2.00 - 7.15 K/uL    Hypochromia 1+    COMP METABOLIC PANEL   Result Value Ref Range    Sodium 135 135 - 145 mmol/L    Potassium 3.5 (L) 3.6 - 5.5 mmol/L    Chloride 99 96 - 112 mmol/L    Co2 22 20 - 33 mmol/L    Anion Gap 14.0 (H) 0.0 - 11.9    Glucose 121 (H) 65 - 99 mg/dL    Bun 8 8 - 22 mg/dL    Creatinine 0.91 0.50 - 1.40 mg/dL    Calcium 9.0 8.4 - 10.2 mg/dL    AST(SGOT) 388 (H) 12 - 45 U/L    ALT(SGPT) 117 (H) 2 - 50 U/L    Alkaline Phosphatase 131 (H) 30 - 99 U/L    Total Bilirubin 2.4 (H) 0.1 - 1.5 mg/dL    Albumin 4.0 3.2 - 4.9 g/dL    Total Protein 7.9 6.0 - 8.2 g/dL    Globulin 3.9 (H) 1.9 - 3.5 g/dL    A-G Ratio 1.0 g/dL   LACTIC ACID   Result Value Ref Range    Lactic Acid 3.01 (H) 0.50 - 2.00 mmol/L    Specimen Venous    URINALYSIS   Result Value Ref Range    Micro Urine Req Microscopic     Color Red     Character Bloody (A)     Specific Gravity 1.025 <1.035    Ph 6.5 5.0 - 8.0    Glucose See Comment Negative mg/dL    Ketones See Comment Negative mg/dL    Protein See Comment Negative  mg/dL    Bilirubin See Comment Negative    Nitrite See Comment Negative    Leukocyte Esterase Negative Negative    Occult Blood Large (A) Negative   TROPONIN   Result Value Ref Range    Troponin I <0.02 0.00 - 0.04 ng/mL   BTYPE NATRIURETIC PEPTIDE   Result Value Ref Range    B Natriuretic Peptide 27 0 - 100 pg/mL   APTT   Result Value Ref Range    APTT 24.7 24.7 - 36.0 sec   PROTHROMBIN TIME   Result Value Ref Range    PT 12.4 12.0 - 14.6 sec    INR 0.96 0.87 - 1.13   D-DIMER   Result Value Ref Range    D-Dimer Screen 226 <250 ng/mL(D-DU)   LIPASE   Result Value Ref Range    Lipase 30 7 - 58 U/L   ESTIMATED GFR   Result Value Ref Range    GFR If African American >60 >60 mL/min/1.73 m 2    GFR If Non African American >60 >60 mL/min/1.73 m 2   URINE MICROSCOPIC (W/UA)   Result Value Ref Range    WBC 2-5 /hpf    RBC >150 (A) /hpf    Bacteria Few (A) None /hpf    Epithelial Cells Few Few /hpf    Mucous Threads Few /hpf   PLATELET ESTIMATE   Result Value Ref Range    Plt Estimation Normal    MORPHOLOGY   Result Value Ref Range    RBC Morphology Present     Polychromia 1+     Poikilocytosis 1+     Target Cells 1+    DIFFERENTIAL COMMENT   Result Value Ref Range    Comments-Diff see below      EKG as reviewed by me and the absence of a cardiologist demonstrates: sinus tachycardia with no STEMI, strain, or dysrhythmia. There is an S1 Q3 T3 pattern. Intervals within normal limits. Normal axis. No hypertrophy.    RADIOLOGY  DX-CHEST-PORTABLE (1 VIEW)   Final Result         1.  No acute cardiopulmonary disease.      US-GALLBLADDER    (Results Pending)     COURSE & MEDICAL DECISION MAKING    This is a 47 y.o. female who presents with cough, retrosternal chest pressure, and dyspnea present for one month and worsening.    Differential Diagnosis includes but is not limited to:  Influenza, pneumonia, reactive airway disease, pulmonary sarcoidosis, ACS, clot    ED Course:  Plan EKG, chest x-ray, labs, steroids, DuoNeb treatment,  with concern for sepsis and dehydration given tachycardic and dry mucous membranes on examination will aggressively rehydrate with 30 mL/kg IV fluid bolus.  EKG sinus tachycardia and no STEMI strain or dysrhythmia. Troponin negative doubt ACS.  D-dimer negative. Doubt pulmonary embolism.  Chest x-ray without any focal consolidation or evidence of edema or pneumothorax.  Urinalysis shows red blood cells with no evidence of infection. The patient is just finishing her menstrual period.  BNP within normal limits to volume overload or CHF.  Lactate slightly elevated at 3, we'll continue IV hydration and reassess.  CMT with evidence of mild anion gap acidosis as well as LFT abnormalities with elevated AST, ALT, and alkaline phosphatase mildly elevated total bilirubin. On reassessment the patient does have right upper quadrant tenderness to palpation which she reports has been intermittent for the last month with no associated vomiting. She's been taking up to 2 g and occasionally 3 g of Tylenol most days. Declines any more significant Tylenol doses at any time.  HCG negative doubt pregnancy.   Acetaminophen level is negative. Doubt acute Tylenol overdose.  Pending RUQ US, further w/u of LFTs, continue rehydration, trending of lactate and ekgs/trops. Pt agrees with and appreciates plan of care.     6:09 AM  hospitalist paged for admission. Dr. Kohler graciously will admit the patient for further workup and treatment.    Medications   NS (BOLUS) infusion 3,276 mL (3,276 mL Intravenous Given 1/25/18 0529)   ipratropium-albuterol (DUONEB) nebulizer solution 3 mL (3 mL Nebulization Given 1/25/18 0522)   dexamethasone pf (DECADRON) injection 10 mg (10 mg Intravenous Given 1/25/18 0514)   cefTRIAXone (ROCEPHIN) injection 2 g (2 g Intravenous Given 1/25/18 0529)   azithromycin (ZITHROMAX) tablet 500 mg (500 mg Oral Given 1/25/18 0514)       FINAL IMPRESSION  1. Cough    2. Upper respiratory tract infection, unspecified type    3.  Tachycardia    4. Right upper quadrant abdominal pain    5. Abnormal LFTs    6. Sarcoidosis (CMS-HCC)    7. Increased anion gap metabolic acidosis    8. Lactic acidosis    9. Dehydration          -ADMIT-      Results, exam findings, clinical impression, presumed diagnosis, treatment options, and plan for admission were discussed with the patient, and they verbalized understanding, agreed with, and appreciated the plan of care.    Pertinent Labs & Imaging studies reviewed and verified by myself, as well as nursing notes and the patient's past medical, family, and social histories (See chart for details).    Portions of this record were made with voice recognition software.  Despite my review, spelling/grammar/context errors may still remain.  Interpretation of this chart should be taken in this context.    Electronically signed by Corona Gomez on 1/25/2018 at 6:16 AM.

## 2018-01-26 PROBLEM — K76.0 FATTY LIVER: Status: ACTIVE | Noted: 2018-01-26

## 2018-01-26 PROBLEM — I10 ESSENTIAL HYPERTENSION: Status: ACTIVE | Noted: 2018-01-26

## 2018-01-26 LAB
ANION GAP SERPL CALC-SCNC: 6 MMOL/L (ref 0–11.9)
BASOPHILS # BLD AUTO: 0.7 % (ref 0–1.8)
BASOPHILS # BLD: 0.07 K/UL (ref 0–0.12)
BUN SERPL-MCNC: <5 MG/DL (ref 8–22)
CALCIUM SERPL-MCNC: 8.1 MG/DL (ref 8.4–10.2)
CHLORIDE SERPL-SCNC: 110 MMOL/L (ref 96–112)
CO2 SERPL-SCNC: 21 MMOL/L (ref 20–33)
CREAT SERPL-MCNC: 0.81 MG/DL (ref 0.5–1.4)
EOSINOPHIL # BLD AUTO: 0.18 K/UL (ref 0–0.51)
EOSINOPHIL NFR BLD: 1.9 % (ref 0–6.9)
ERYTHROCYTE [DISTWIDTH] IN BLOOD BY AUTOMATED COUNT: 67.5 FL (ref 35.9–50)
GLUCOSE SERPL-MCNC: 142 MG/DL (ref 65–99)
HCT VFR BLD AUTO: 27.1 % (ref 37–47)
HGB BLD-MCNC: 8.8 G/DL (ref 12–16)
IMM GRANULOCYTES # BLD AUTO: 0.05 K/UL (ref 0–0.11)
IMM GRANULOCYTES NFR BLD AUTO: 0.5 % (ref 0–0.9)
LACTATE BLD-SCNC: 1.59 MMOL/L (ref 0.5–2)
LACTATE BLD-SCNC: 2.31 MMOL/L (ref 0.5–2)
LACTATE BLD-SCNC: 2.85 MMOL/L (ref 0.5–2)
LV EJECT FRACT  99904: 65
LYMPHOCYTES # BLD AUTO: 3.11 K/UL (ref 1–4.8)
LYMPHOCYTES NFR BLD: 33 % (ref 22–41)
MCH RBC QN AUTO: 26.7 PG (ref 27–33)
MCHC RBC AUTO-ENTMCNC: 32.5 G/DL (ref 33.6–35)
MCV RBC AUTO: 82.4 FL (ref 81.4–97.8)
MONOCYTES # BLD AUTO: 0.59 K/UL (ref 0–0.85)
MONOCYTES NFR BLD AUTO: 6.3 % (ref 0–13.4)
NEUTROPHILS # BLD AUTO: 5.42 K/UL (ref 2–7.15)
NEUTROPHILS NFR BLD: 57.6 % (ref 44–72)
NRBC # BLD AUTO: 0.02 K/UL
NRBC BLD-RTO: 0.2 /100 WBC
PLATELET # BLD AUTO: 243 K/UL (ref 164–446)
PMV BLD AUTO: 10.6 FL (ref 9–12.9)
POTASSIUM SERPL-SCNC: 3.1 MMOL/L (ref 3.6–5.5)
RBC # BLD AUTO: 3.29 M/UL (ref 4.2–5.4)
SODIUM SERPL-SCNC: 137 MMOL/L (ref 135–145)
SPECIMEN DRAWN FROM PATIENT: ABNORMAL
SPECIMEN DRAWN FROM PATIENT: ABNORMAL
SPECIMEN DRAWN FROM PATIENT: NORMAL
WBC # BLD AUTO: 9.4 K/UL (ref 4.8–10.8)

## 2018-01-26 PROCEDURE — 700105 HCHG RX REV CODE 258: Performed by: INTERNAL MEDICINE

## 2018-01-26 PROCEDURE — 700102 HCHG RX REV CODE 250 W/ 637 OVERRIDE(OP): Performed by: INTERNAL MEDICINE

## 2018-01-26 PROCEDURE — 700101 HCHG RX REV CODE 250: Performed by: HOSPITALIST

## 2018-01-26 PROCEDURE — 700102 HCHG RX REV CODE 250 W/ 637 OVERRIDE(OP): Performed by: HOSPITALIST

## 2018-01-26 PROCEDURE — 83605 ASSAY OF LACTIC ACID: CPT | Mod: 91

## 2018-01-26 PROCEDURE — 700101 HCHG RX REV CODE 250: Performed by: FAMILY MEDICINE

## 2018-01-26 PROCEDURE — A9270 NON-COVERED ITEM OR SERVICE: HCPCS | Performed by: HOSPITALIST

## 2018-01-26 PROCEDURE — 700111 HCHG RX REV CODE 636 W/ 250 OVERRIDE (IP): Performed by: FAMILY MEDICINE

## 2018-01-26 PROCEDURE — 85025 COMPLETE CBC W/AUTO DIFF WBC: CPT

## 2018-01-26 PROCEDURE — 700102 HCHG RX REV CODE 250 W/ 637 OVERRIDE(OP): Performed by: FAMILY MEDICINE

## 2018-01-26 PROCEDURE — A9270 NON-COVERED ITEM OR SERVICE: HCPCS | Performed by: FAMILY MEDICINE

## 2018-01-26 PROCEDURE — 99232 SBSQ HOSP IP/OBS MODERATE 35: CPT | Performed by: INTERNAL MEDICINE

## 2018-01-26 PROCEDURE — 700111 HCHG RX REV CODE 636 W/ 250 OVERRIDE (IP): Performed by: HOSPITALIST

## 2018-01-26 PROCEDURE — 36415 COLL VENOUS BLD VENIPUNCTURE: CPT

## 2018-01-26 PROCEDURE — A9270 NON-COVERED ITEM OR SERVICE: HCPCS | Performed by: INTERNAL MEDICINE

## 2018-01-26 PROCEDURE — 80048 BASIC METABOLIC PNL TOTAL CA: CPT

## 2018-01-26 PROCEDURE — 770006 HCHG ROOM/CARE - MED/SURG/GYN SEMI*

## 2018-01-26 RX ORDER — FUROSEMIDE 20 MG/1
20 TABLET ORAL ONCE
Status: COMPLETED | OUTPATIENT
Start: 2018-01-26 | End: 2018-01-26

## 2018-01-26 RX ORDER — DOXYCYCLINE 100 MG/1
100 TABLET ORAL EVERY 12 HOURS
Status: DISCONTINUED | OUTPATIENT
Start: 2018-01-26 | End: 2018-01-28 | Stop reason: HOSPADM

## 2018-01-26 RX ORDER — POTASSIUM CHLORIDE 20 MEQ/1
20 TABLET, EXTENDED RELEASE ORAL 2 TIMES DAILY
Status: DISCONTINUED | OUTPATIENT
Start: 2018-01-26 | End: 2018-01-27

## 2018-01-26 RX ORDER — LISINOPRIL 20 MG/1
20 TABLET ORAL
Status: DISCONTINUED | OUTPATIENT
Start: 2018-01-26 | End: 2018-01-28 | Stop reason: HOSPADM

## 2018-01-26 RX ADMIN — DOXYCYCLINE 100 MG: 100 TABLET ORAL at 20:30

## 2018-01-26 RX ADMIN — FUROSEMIDE 20 MG: 20 TABLET ORAL at 20:30

## 2018-01-26 RX ADMIN — CEFTRIAXONE SODIUM 2 G: 10 INJECTION, POWDER, FOR SOLUTION INTRAVENOUS at 09:40

## 2018-01-26 RX ADMIN — BUDESONIDE AND FORMOTEROL FUMARATE DIHYDRATE 2 PUFF: 160; 4.5 AEROSOL RESPIRATORY (INHALATION) at 11:39

## 2018-01-26 RX ADMIN — POTASSIUM CHLORIDE 20 MEQ: 1500 TABLET, EXTENDED RELEASE ORAL at 20:31

## 2018-01-26 RX ADMIN — HYDROCODONE BITARTRATE AND ACETAMINOPHEN 1 TABLET: 5; 325 TABLET ORAL at 09:40

## 2018-01-26 RX ADMIN — LABETALOL HYDROCHLORIDE 10 MG: 5 INJECTION, SOLUTION INTRAVENOUS at 19:05

## 2018-01-26 RX ADMIN — FERROUS SULFATE TAB 325 MG (65 MG ELEMENTAL FE) 325 MG: 325 (65 FE) TAB at 09:40

## 2018-01-26 RX ADMIN — SODIUM CHLORIDE: 9 INJECTION, SOLUTION INTRAVENOUS at 09:40

## 2018-01-26 RX ADMIN — ENOXAPARIN SODIUM 40 MG: 100 INJECTION SUBCUTANEOUS at 09:40

## 2018-01-26 RX ADMIN — BUDESONIDE AND FORMOTEROL FUMARATE DIHYDRATE 2 PUFF: 160; 4.5 AEROSOL RESPIRATORY (INHALATION) at 20:31

## 2018-01-26 RX ADMIN — LISINOPRIL 20 MG: 20 TABLET ORAL at 18:20

## 2018-01-26 RX ADMIN — HYDROCODONE BITARTRATE AND ACETAMINOPHEN 1 TABLET: 5; 325 TABLET ORAL at 20:39

## 2018-01-26 RX ADMIN — LABETALOL HYDROCHLORIDE 10 MG: 5 INJECTION, SOLUTION INTRAVENOUS at 11:34

## 2018-01-26 RX ADMIN — POTASSIUM CHLORIDE 20 MEQ: 1500 TABLET, EXTENDED RELEASE ORAL at 12:53

## 2018-01-26 ASSESSMENT — ENCOUNTER SYMPTOMS
FOCAL WEAKNESS: 0
VOMITING: 0
WEIGHT LOSS: 0
DEPRESSION: 0
SHORTNESS OF BREATH: 1
CHILLS: 0
PALPITATIONS: 0
HEADACHES: 0
MYALGIAS: 0
BRUISES/BLEEDS EASILY: 0
BLURRED VISION: 0
HEMOPTYSIS: 0
COUGH: 1
LOSS OF CONSCIOUSNESS: 0
SPUTUM PRODUCTION: 0
DIZZINESS: 0
FEVER: 0
NAUSEA: 0
ABDOMINAL PAIN: 0

## 2018-01-26 ASSESSMENT — PAIN SCALES - GENERAL
PAINLEVEL_OUTOF10: 7
PAINLEVEL_OUTOF10: 7

## 2018-01-26 NOTE — CARE PLAN
Problem: Venous Thromboembolism (VTW)/Deep Vein Thrombosis (DVT) Prevention:  Goal: Patient will participate in Venous Thrombosis (VTE)/Deep Vein Thrombosis (DVT)Prevention Measures   01/25/18 1200   OTHER   Risk Assessment Score 1   VTE RISK Moderate   Pharmacologic Prophylaxis Used LMWH: Enoxaparin(Lovenox)       Problem: Pain Management  Goal: Pain level will decrease to patient's comfort goal  Pt medicated for comp[laints of back pain. Pt resting comfortably at this time. States she had relief after NORCO was administered.

## 2018-01-26 NOTE — PROGRESS NOTES
Alerted by nursing staff that the patient has a lactic acid level IV.91. I reviewed her chart her vital signs remain stable. The nurses as the patient feels fine and is actually clinically improving. I discussed the situation with the nursing supervisor and at this time the patient does not appear to be septic other than the lactic acid which may simply represent volume depletion. At this time I have ordered a 1 L bolus of saline and a repeat lactic acid level. If the lactic acid level remains above 4 then the patient will be transferred to the intensive care unit. In addition if she becomes unstable hypotensive or develops other signs of sepsis similarly she will be transferred. Supervisor Ludin is aware of the situation.

## 2018-01-26 NOTE — PROGRESS NOTES
Pt place on 2 liter of oxygen, pt desaturates to 88% on room air, no signs of respiratory distress at this time.

## 2018-01-26 NOTE — PROGRESS NOTES
NS 1L bolus currently infusing. Pt will have lactic acid check q4 hours. Will redraw at 0030 & continue to monitor

## 2018-01-26 NOTE — PROGRESS NOTES
Pt had lactic acid of 4.91. Paged Dr. Christianson for updates. Dr. Christianson will review patient's charting and will place the orders.

## 2018-01-26 NOTE — PROGRESS NOTES
Received report from day shift nurse. Assumed pt care at 1915. Pt is A&Ox4, resting comfortably in bed. Pt on r.a.. No signs of SOB/respiratory distress. Labs noted (pt had lactic acid of 3.01); redrew lactic acid & awaiting for result VSS. Pt c/o back pain radiating to anterior/lower abdomen, given Norco 2 tabs per MAR. Assessment completed. Fall precautions in place. Bed locked & at lowest position. Call light and personal belongings within reach. Continue to monitor

## 2018-01-27 PROBLEM — E83.42 HYPOMAGNESEMIA: Status: ACTIVE | Noted: 2018-01-27

## 2018-01-27 LAB
ALBUMIN SERPL BCP-MCNC: 3.2 G/DL (ref 3.2–4.9)
ALBUMIN/GLOB SERPL: 1.1 G/DL
ALP SERPL-CCNC: 91 U/L (ref 30–99)
ALT SERPL-CCNC: 98 U/L (ref 2–50)
ANION GAP SERPL CALC-SCNC: 4 MMOL/L (ref 0–11.9)
AST SERPL-CCNC: 169 U/L (ref 12–45)
BASOPHILS # BLD AUTO: 1.5 % (ref 0–1.8)
BASOPHILS # BLD: 0.1 K/UL (ref 0–0.12)
BILIRUB SERPL-MCNC: 0.9 MG/DL (ref 0.1–1.5)
BUN SERPL-MCNC: 5 MG/DL (ref 8–22)
CALCIUM SERPL-MCNC: 8.2 MG/DL (ref 8.4–10.2)
CHLORIDE SERPL-SCNC: 112 MMOL/L (ref 96–112)
CO2 SERPL-SCNC: 23 MMOL/L (ref 20–33)
CREAT SERPL-MCNC: 0.73 MG/DL (ref 0.5–1.4)
EOSINOPHIL # BLD AUTO: 0.27 K/UL (ref 0–0.51)
EOSINOPHIL NFR BLD: 4 % (ref 0–6.9)
ERYTHROCYTE [DISTWIDTH] IN BLOOD BY AUTOMATED COUNT: 69 FL (ref 35.9–50)
GLOBULIN SER CALC-MCNC: 2.9 G/DL (ref 1.9–3.5)
GLUCOSE SERPL-MCNC: 105 MG/DL (ref 65–99)
HCT VFR BLD AUTO: 26.5 % (ref 37–47)
HGB BLD-MCNC: 8.5 G/DL (ref 12–16)
IMM GRANULOCYTES # BLD AUTO: 0.03 K/UL (ref 0–0.11)
IMM GRANULOCYTES NFR BLD AUTO: 0.4 % (ref 0–0.9)
LYMPHOCYTES # BLD AUTO: 2.45 K/UL (ref 1–4.8)
LYMPHOCYTES NFR BLD: 36.5 % (ref 22–41)
MAGNESIUM SERPL-MCNC: 1.2 MG/DL (ref 1.5–2.5)
MCH RBC QN AUTO: 26.7 PG (ref 27–33)
MCHC RBC AUTO-ENTMCNC: 32.1 G/DL (ref 33.6–35)
MCV RBC AUTO: 83.3 FL (ref 81.4–97.8)
MONOCYTES # BLD AUTO: 0.49 K/UL (ref 0–0.85)
MONOCYTES NFR BLD AUTO: 7.3 % (ref 0–13.4)
NEUTROPHILS # BLD AUTO: 3.37 K/UL (ref 2–7.15)
NEUTROPHILS NFR BLD: 50.3 % (ref 44–72)
NRBC # BLD AUTO: 0.02 K/UL
NRBC BLD-RTO: 0.3 /100 WBC
PLATELET # BLD AUTO: 227 K/UL (ref 164–446)
PMV BLD AUTO: 10.9 FL (ref 9–12.9)
POTASSIUM SERPL-SCNC: 3.5 MMOL/L (ref 3.6–5.5)
PROT SERPL-MCNC: 6.1 G/DL (ref 6–8.2)
RBC # BLD AUTO: 3.18 M/UL (ref 4.2–5.4)
SODIUM SERPL-SCNC: 139 MMOL/L (ref 135–145)
WBC # BLD AUTO: 6.7 K/UL (ref 4.8–10.8)

## 2018-01-27 PROCEDURE — 770006 HCHG ROOM/CARE - MED/SURG/GYN SEMI*

## 2018-01-27 PROCEDURE — 80053 COMPREHEN METABOLIC PANEL: CPT

## 2018-01-27 PROCEDURE — 700102 HCHG RX REV CODE 250 W/ 637 OVERRIDE(OP): Performed by: FAMILY MEDICINE

## 2018-01-27 PROCEDURE — 83036 HEMOGLOBIN GLYCOSYLATED A1C: CPT

## 2018-01-27 PROCEDURE — 85025 COMPLETE CBC W/AUTO DIFF WBC: CPT

## 2018-01-27 PROCEDURE — 99232 SBSQ HOSP IP/OBS MODERATE 35: CPT | Performed by: INTERNAL MEDICINE

## 2018-01-27 PROCEDURE — 700111 HCHG RX REV CODE 636 W/ 250 OVERRIDE (IP): Performed by: INTERNAL MEDICINE

## 2018-01-27 PROCEDURE — 700102 HCHG RX REV CODE 250 W/ 637 OVERRIDE(OP): Performed by: INTERNAL MEDICINE

## 2018-01-27 PROCEDURE — 700111 HCHG RX REV CODE 636 W/ 250 OVERRIDE (IP): Performed by: FAMILY MEDICINE

## 2018-01-27 PROCEDURE — A9270 NON-COVERED ITEM OR SERVICE: HCPCS | Performed by: INTERNAL MEDICINE

## 2018-01-27 PROCEDURE — 83735 ASSAY OF MAGNESIUM: CPT

## 2018-01-27 PROCEDURE — 36415 COLL VENOUS BLD VENIPUNCTURE: CPT

## 2018-01-27 PROCEDURE — A9270 NON-COVERED ITEM OR SERVICE: HCPCS | Performed by: FAMILY MEDICINE

## 2018-01-27 RX ORDER — POTASSIUM CHLORIDE 20 MEQ/1
40 TABLET, EXTENDED RELEASE ORAL ONCE
Status: COMPLETED | OUTPATIENT
Start: 2018-01-27 | End: 2018-01-27

## 2018-01-27 RX ORDER — MAGNESIUM SULFATE HEPTAHYDRATE 40 MG/ML
2 INJECTION, SOLUTION INTRAVENOUS ONCE
Status: COMPLETED | OUTPATIENT
Start: 2018-01-27 | End: 2018-01-27

## 2018-01-27 RX ADMIN — BUDESONIDE AND FORMOTEROL FUMARATE DIHYDRATE 2 PUFF: 160; 4.5 AEROSOL RESPIRATORY (INHALATION) at 20:41

## 2018-01-27 RX ADMIN — HYDROCODONE BITARTRATE AND ACETAMINOPHEN 1 TABLET: 5; 325 TABLET ORAL at 08:18

## 2018-01-27 RX ADMIN — LISINOPRIL 20 MG: 20 TABLET ORAL at 08:18

## 2018-01-27 RX ADMIN — POTASSIUM CHLORIDE 20 MEQ: 1500 TABLET, EXTENDED RELEASE ORAL at 08:18

## 2018-01-27 RX ADMIN — DOXYCYCLINE 100 MG: 100 TABLET ORAL at 08:18

## 2018-01-27 RX ADMIN — ENOXAPARIN SODIUM 40 MG: 100 INJECTION SUBCUTANEOUS at 08:18

## 2018-01-27 RX ADMIN — BUDESONIDE AND FORMOTEROL FUMARATE DIHYDRATE 2 PUFF: 160; 4.5 AEROSOL RESPIRATORY (INHALATION) at 08:19

## 2018-01-27 RX ADMIN — HYDROCODONE BITARTRATE AND ACETAMINOPHEN 1 TABLET: 5; 325 TABLET ORAL at 18:54

## 2018-01-27 RX ADMIN — FERROUS SULFATE TAB 325 MG (65 MG ELEMENTAL FE) 325 MG: 325 (65 FE) TAB at 08:18

## 2018-01-27 RX ADMIN — DOXYCYCLINE 100 MG: 100 TABLET ORAL at 20:41

## 2018-01-27 RX ADMIN — POTASSIUM CHLORIDE 40 MEQ: 1500 TABLET, EXTENDED RELEASE ORAL at 10:42

## 2018-01-27 RX ADMIN — MAGNESIUM SULFATE IN WATER 2 G: 40 INJECTION, SOLUTION INTRAVENOUS at 10:42

## 2018-01-27 ASSESSMENT — ENCOUNTER SYMPTOMS
HEMOPTYSIS: 0
CHILLS: 0
SHORTNESS OF BREATH: 1
BRUISES/BLEEDS EASILY: 0
SPUTUM PRODUCTION: 0
FEVER: 0
HEADACHES: 0
WEIGHT LOSS: 0
LOSS OF CONSCIOUSNESS: 0
ABDOMINAL PAIN: 1
FOCAL WEAKNESS: 0
DEPRESSION: 0
BLURRED VISION: 0
DIZZINESS: 0
MYALGIAS: 0
NERVOUS/ANXIOUS: 1
NAUSEA: 0
COUGH: 1
VOMITING: 0

## 2018-01-27 ASSESSMENT — PAIN SCALES - GENERAL
PAINLEVEL_OUTOF10: 7
PAINLEVEL_OUTOF10: 7

## 2018-01-27 NOTE — PROGRESS NOTES
Received report from Leela. Eyes on patient at 1900   Patient had just been up walking. She is alert, oriented, with clear speech. Patient has no needs at this time.

## 2018-01-27 NOTE — PROGRESS NOTES
"Pt in bed, still hypertensive with BP at 176/100. Labetalol given as ordered. Pt has generalized edema, lungs are clear at this time. While administering labetalol pt stated, \" I'm just angry i'm in the hospital again, I took all my antibiotics and my water pill\". Pt does not remember the name of the \" water pill\" she was taking at home and it is not on medication reconciliation record. Pt states she was taking a water pill because she was told she had water in her lungs before discharge the last time she was hospitalized. Fluids paused until further orders. RN, kg, updated.    Dr. Marielle Christianson paged.       "

## 2018-01-27 NOTE — ASSESSMENT & PLAN NOTE
-encourage weight loss, avoid statins/hepatotoxic medications at this time  Discussed EtOH as well

## 2018-01-27 NOTE — CARE PLAN
Problem: Venous Thromboembolism (VTW)/Deep Vein Thrombosis (DVT) Prevention:  Goal: Patient will participate in Venous Thrombosis (VTE)/Deep Vein Thrombosis (DVT)Prevention Measures   01/26/18 0900   OTHER   Risk Assessment Score 1   VTE RISK Moderate   Pharmacologic Prophylaxis Used LMWH: Enoxaparin(Lovenox)       Problem: Respiratory:  Goal: Respiratory status will improve  Pt placed on 2 liters of oxygen. Pt saturation maintained above 90%.

## 2018-01-27 NOTE — ASSESSMENT & PLAN NOTE
-improving  -continue current therapies, IV abx's, F/U all cultures  -RT protocol, wean o2, should be off by tomorrow

## 2018-01-27 NOTE — CARE PLAN
Problem: Infection  Goal: Will remain free from infection  Patient progressing as expected. No s/s of infection, pt remains afebrile.     Problem: Respiratory:  Goal: Respiratory status will improve  Patient progressing as expected. Cough is less frequent.

## 2018-01-27 NOTE — PROGRESS NOTES
"/106, pt denies headache. Pt states she has pain \" all over\", from chest pain to back pain, relieved by NORCO,   unchanged since admit. Pt states her SOB is the same since admit. Pt on 2 liters of oxygen saturation at 97%.  Pt states \" I just don't feel good\". Pt looks fatigued. Lisinopril given as ordered.     1827 paged Dr. Angulo    1831 pt eating dinner at this time, no signs of distress.     "

## 2018-01-27 NOTE — PROGRESS NOTES
Received Bedside report. Assumed care at 0715. This pt is AOx4, ambulatory without assistance, no N/V this morning, voiding, c/o 7/10 R sided abd pain, medicated per MAR. Patient and RN discussed plan of care including monitoring labs/potential dc: questions answered. Labs noted, assessment complete, patient tolerating regular diet. Call light in place, fall precautions in place, patient educated on importance of calling for assistance. No additional needs at this time. VSS

## 2018-01-27 NOTE — PROGRESS NOTES
[facility]    Date of Service: 2018    Chief Complaint  47 y.o. female admitted 2018 with bronchitis and sepsis    Interval Problem Update  HTN-not well controlled, see below.    Bronchitis-she feels that her breathing is improving, o2 requirements are coming down.    Consultants/Specialty  None    Disposition  Medical        Review of Systems   Constitutional: Negative for chills, fever and weight loss.   HENT: Negative for hearing loss.    Eyes: Negative for blurred vision.   Respiratory: Positive for cough and shortness of breath. Negative for hemoptysis and sputum production.    Cardiovascular: Positive for leg swelling. Negative for chest pain and palpitations.   Gastrointestinal: Negative for abdominal pain, nausea and vomiting.   Genitourinary: Negative for dysuria and urgency.   Musculoskeletal: Negative for myalgias.   Neurological: Negative for dizziness, focal weakness, loss of consciousness and headaches.   Endo/Heme/Allergies: Does not bruise/bleed easily.   Psychiatric/Behavioral: Negative for depression.   All other systems reviewed and are negative.     Physical Exam  Laboratory/Imaging   Hemodynamics  Temp (24hrs), Av.5 °C (97.7 °F), Min:36.2 °C (97.2 °F), Max:36.7 °C (98 °F)   Temperature: 36.7 °C (98 °F)  Pulse  Av.3  Min: 83  Max: 124    Blood Pressure: 153/85      Respiratory      Respiration: 20, Pulse Oximetry: 98 %     Work Of Breathing / Effort: Mild  RUL Breath Sounds: Clear, RML Breath Sounds: Diminished, RLL Breath Sounds: Diminished, TENZIN Breath Sounds: Clear, LLL Breath Sounds: Diminished    Fluids    Intake/Output Summary (Last 24 hours) at 18 1719  Last data filed at 18 0600   Gross per 24 hour   Intake             3480 ml   Output                0 ml   Net             3480 ml       Nutrition  Orders Placed This Encounter   Procedures   • Diet Order     Standing Status:   Standing     Number of Occurrences:   1     Order Specific  Question:   Diet:     Answer:   Regular [1]     Physical Exam   Constitutional: She is oriented to person, place, and time. She appears well-developed and well-nourished. No distress.   HENT:   Head: Normocephalic and atraumatic.   Mouth/Throat: No oropharyngeal exudate.   Eyes: Pupils are equal, round, and reactive to light. No scleral icterus.   Neck: Normal range of motion. Neck supple. No thyromegaly present.   Cardiovascular: Normal rate, regular rhythm, normal heart sounds and intact distal pulses.    No murmur heard.  Pulmonary/Chest: Effort normal. No respiratory distress. She has no wheezes. She has rales.   Somewhat poor aeration   Abdominal: Soft. Bowel sounds are normal. She exhibits no distension. There is no tenderness.   Musculoskeletal: Normal range of motion. She exhibits edema. She exhibits no tenderness.   Neurological: She is alert and oriented to person, place, and time. No cranial nerve deficit.   Skin: Skin is warm and dry. No rash noted.   Psychiatric: She has a normal mood and affect.   Nursing note and vitals reviewed.      Recent Labs      01/25/18   0440  01/26/18   0930   WBC  10.0  9.4   RBC  4.28  3.29*   HEMOGLOBIN  11.2*  8.8*   HEMATOCRIT  34.7*  27.1*   MCV  81.1*  82.4   MCH  26.2*  26.7*   MCHC  32.3*  32.5*   RDW  65.7*  67.5*   PLATELETCT  310  243   MPV  10.8  10.6     Recent Labs      01/25/18   0440  01/26/18   0930   SODIUM  135  137   POTASSIUM  3.5*  3.1*   CHLORIDE  99  110   CO2  22  21   GLUCOSE  121*  142*   BUN  8  <5*   CREATININE  0.91  0.81   CALCIUM  9.0  8.1*     Recent Labs      01/25/18   0440   APTT  24.7   INR  0.96     Recent Labs      01/25/18   0440   BNPBTYPENAT  27     Recent Labs      01/25/18   0815   TRIGLYCERIDE  177*   HDL  30*   LDL  198*          Assessment/Plan     Essential hypertension- (present on admission)   Assessment & Plan    -not well controlled, start lisinopril 20 mg daily, adjust PRN        Fatty liver- (present on admission)    Assessment & Plan    -encourage weight loss, avoid statins/hepatotoxic medications at this time        Elevated LFTs- (present on admission)   Assessment & Plan    -AB U/S shows fatty liver, could be related to infection as well  -check LFT's in the AM, no belly pain at this time        Acute bronchitis- (present on admission)   Assessment & Plan    -improving  -continue current therapies, IV abx's, F/U all cultures  -RT protocol, wean o2, should be off by tomorrow        Sarcoidosis (CMS-HCC)- (present on admission)   Assessment & Plan    -quiescent at this time        Obesity- (present on admission)   Assessment & Plan    -encourage weight loss            Reviewed items::  Labs reviewed and Medications reviewed  Anderson catheter::  No Anderson  DVT prophylaxis pharmacological::  Enoxaparin (Lovenox)

## 2018-01-27 NOTE — ASSESSMENT & PLAN NOTE
-AB U/S shows fatty liver  SGOT>SGPT, hypoKalemia Hypo Magnesemia  Normal hepatitis serologies- consider EtOH as well

## 2018-01-27 NOTE — PROGRESS NOTES
Dr. Christianson called back. Dr. Christianson updated. Orders received to stop IV fluids and give lasix PO 20 mg one time. Dr. Christianson aware pt received labetalol 10 mg and lisinopril 20mg.

## 2018-01-27 NOTE — DISCHARGE PLANNING
Medical Social Work    Referral: Pt discussed at IDT rounds this afternoon.    Intervention: Per flowsheet, pt lives with spouse and expects to d.c home.  Pt does not have home O2 and is not currently on O2.  Therapies have not evaluated at the time of this note.  Based upon this review and information, there are no SS or DC needs identified at this time.      Plan: CAMERON Vazquez available to assist with any d.c planning.      Care Transition Team Assessment    Information Source  Orientation : Oriented x 4  Information Given By: Patient    Readmission Evaluation  Is this a readmission?: No    Elopement Risk  Legal Hold: No  Ambulatory or Self Mobile in Wheelchair: Yes  Disoriented: No  Psychiatric Symptoms: None  History of Wandering: No  Elopement this Admit: No  Vocalizing Wanting to Leave: No  Displays Behaviors, Body Language Wanting to Leave: No-Not at Risk for Elopement  Elopement Risk: Not at Risk for Elopement    Interdisciplinary Discharge Planning  Does Admitting Nurse Feel This Could be a Complex Discharge?: No  Primary Care Physician: gilda  Lives with - Patient's Self Care Capacity: Spouse  Support Systems: Family Member(s)  Housing / Facility: [address]  Do You Take your Prescribed Medications Regularly: Yes  Able to Return to Previous ADL's: Yes  Mobility Issues: No  Prior Services: None  Patient Expects to be Discharged to:: home  Assistance Needed: No  Durable Medical Equipment: Not Applicable    Discharge Preparedness  What is your plan after discharge?: Uncertain - pending medical team collaboration              Vision / Hearing Impairment  Vision Impairment : No  Hearing Impairment : No    Values / Beliefs / Concerns  Values / Beliefs Concerns : No    Advance Directive  Advance Directive?: None    Domestic Abuse  Have you ever been the victim of abuse or violence?: No  Physical Abuse or Sexual Abuse: No  Verbal Abuse or Emotional Abuse: No  Possible Abuse Reported to:: Not  Applicable    Psychological Assessment  History of Substance Abuse: Alcohol

## 2018-01-28 VITALS
WEIGHT: 240.74 LBS | SYSTOLIC BLOOD PRESSURE: 154 MMHG | HEIGHT: 67 IN | HEART RATE: 85 BPM | TEMPERATURE: 98.3 F | OXYGEN SATURATION: 92 % | DIASTOLIC BLOOD PRESSURE: 85 MMHG | RESPIRATION RATE: 18 BRPM | BODY MASS INDEX: 37.79 KG/M2

## 2018-01-28 LAB
ALBUMIN SERPL BCP-MCNC: 3.3 G/DL (ref 3.2–4.9)
ALBUMIN/GLOB SERPL: 1.2 G/DL
ALP SERPL-CCNC: 90 U/L (ref 30–99)
ALT SERPL-CCNC: 78 U/L (ref 2–50)
ANION GAP SERPL CALC-SCNC: 6 MMOL/L (ref 0–11.9)
AST SERPL-CCNC: 100 U/L (ref 12–45)
BILIRUB SERPL-MCNC: 0.9 MG/DL (ref 0.1–1.5)
BUN SERPL-MCNC: 5 MG/DL (ref 8–22)
CALCIUM SERPL-MCNC: 8.6 MG/DL (ref 8.4–10.2)
CHLORIDE SERPL-SCNC: 107 MMOL/L (ref 96–112)
CO2 SERPL-SCNC: 23 MMOL/L (ref 20–33)
CREAT SERPL-MCNC: 0.66 MG/DL (ref 0.5–1.4)
EST. AVERAGE GLUCOSE BLD GHB EST-MCNC: 126 MG/DL
GLOBULIN SER CALC-MCNC: 2.8 G/DL (ref 1.9–3.5)
GLUCOSE SERPL-MCNC: 102 MG/DL (ref 65–99)
HBA1C MFR BLD: 6 % (ref 0–5.6)
MAGNESIUM SERPL-MCNC: 1.5 MG/DL (ref 1.5–2.5)
POTASSIUM SERPL-SCNC: 3.3 MMOL/L (ref 3.6–5.5)
PROT SERPL-MCNC: 6.1 G/DL (ref 6–8.2)
SODIUM SERPL-SCNC: 136 MMOL/L (ref 135–145)

## 2018-01-28 PROCEDURE — A9270 NON-COVERED ITEM OR SERVICE: HCPCS | Performed by: INTERNAL MEDICINE

## 2018-01-28 PROCEDURE — A9270 NON-COVERED ITEM OR SERVICE: HCPCS | Performed by: FAMILY MEDICINE

## 2018-01-28 PROCEDURE — 700102 HCHG RX REV CODE 250 W/ 637 OVERRIDE(OP): Performed by: INTERNAL MEDICINE

## 2018-01-28 PROCEDURE — 700102 HCHG RX REV CODE 250 W/ 637 OVERRIDE(OP): Performed by: FAMILY MEDICINE

## 2018-01-28 PROCEDURE — 80053 COMPREHEN METABOLIC PANEL: CPT

## 2018-01-28 PROCEDURE — 99239 HOSP IP/OBS DSCHRG MGMT >30: CPT | Performed by: INTERNAL MEDICINE

## 2018-01-28 PROCEDURE — 83735 ASSAY OF MAGNESIUM: CPT

## 2018-01-28 PROCEDURE — 36415 COLL VENOUS BLD VENIPUNCTURE: CPT

## 2018-01-28 RX ORDER — POTASSIUM CHLORIDE 20 MEQ/1
40 TABLET, EXTENDED RELEASE ORAL ONCE
Status: COMPLETED | OUTPATIENT
Start: 2018-01-28 | End: 2018-01-28

## 2018-01-28 RX ORDER — GUAIFENESIN 600 MG/1
600 TABLET, EXTENDED RELEASE ORAL EVERY 12 HOURS
Qty: 28 TAB | Status: ON HOLD | COMMUNITY
Start: 2018-01-28 | End: 2019-03-04

## 2018-01-28 RX ORDER — LISINOPRIL AND HYDROCHLOROTHIAZIDE 25; 20 MG/1; MG/1
1 TABLET ORAL DAILY
Qty: 30 TAB | Refills: 1 | Status: SHIPPED | OUTPATIENT
Start: 2018-01-28 | End: 2018-02-01

## 2018-01-28 RX ORDER — HYDROCHLOROTHIAZIDE 25 MG/1
25 TABLET ORAL
Status: DISCONTINUED | OUTPATIENT
Start: 2018-01-28 | End: 2018-01-28 | Stop reason: HOSPADM

## 2018-01-28 RX ORDER — DOXYCYCLINE 100 MG/1
100 TABLET ORAL EVERY 12 HOURS
Qty: 6 TAB | Refills: 0 | Status: SHIPPED | OUTPATIENT
Start: 2018-01-28 | End: 2018-02-01

## 2018-01-28 RX ORDER — FERROUS SULFATE 325(65) MG
325 TABLET ORAL DAILY
Qty: 30 TAB | Status: ON HOLD | COMMUNITY
Start: 2018-01-29 | End: 2019-03-04

## 2018-01-28 RX ADMIN — POTASSIUM CHLORIDE 40 MEQ: 1500 TABLET, EXTENDED RELEASE ORAL at 10:32

## 2018-01-28 RX ADMIN — DOXYCYCLINE 100 MG: 100 TABLET ORAL at 08:24

## 2018-01-28 RX ADMIN — FERROUS SULFATE TAB 325 MG (65 MG ELEMENTAL FE) 325 MG: 325 (65 FE) TAB at 08:24

## 2018-01-28 RX ADMIN — LISINOPRIL 20 MG: 20 TABLET ORAL at 08:24

## 2018-01-28 RX ADMIN — BUDESONIDE AND FORMOTEROL FUMARATE DIHYDRATE 2 PUFF: 160; 4.5 AEROSOL RESPIRATORY (INHALATION) at 08:30

## 2018-01-28 RX ADMIN — HYDROCHLOROTHIAZIDE 25 MG: 25 TABLET ORAL at 10:32

## 2018-01-28 NOTE — CARE PLAN
Problem: Pain Management  Goal: Pain level will decrease to patient's comfort goal  Outcome: PROGRESSING AS EXPECTED  Numeric scale to assess pain. Prns per MAR. Rest/reposition for comfort.     Problem: Respiratory:  Goal: Respiratory status will improve  Outcome: PROGRESSING AS EXPECTED  Encouraging cough/turn/deep breathe/IS/ambulation. O2 as needed.

## 2018-01-28 NOTE — PROGRESS NOTES
[facility] Hospitalist Progress Note    Date of Service: 2018    Chief Complaint  47 y.o. female admitted 2018 with bronchitis and sepsis    Interval Problem Update  Improving control of blood pressure  She still has right upper quadrant pain but the pattern and distribution are consistent with fatty liver disease  She is saturating 90% on room air  She remains very anxious  We discussed weight loss, she denies significant alcohol use but has occasional vi and Coke.   Reviewed care plan-replacing electrolytes-possibly discharge tomorrow    Consultants/Specialty  None    Disposition  Medical        Review of Systems   Constitutional: Positive for malaise/fatigue. Negative for chills, fever and weight loss.   HENT: Negative for hearing loss.    Eyes: Negative for blurred vision.   Respiratory: Positive for cough (improved) and shortness of breath (improved). Negative for hemoptysis and sputum production.    Cardiovascular: Positive for leg swelling (improved). Negative for chest pain.   Gastrointestinal: Positive for abdominal pain (right upper quadrant). Negative for nausea and vomiting.   Genitourinary: Negative for dysuria and urgency.   Musculoskeletal: Negative for myalgias.   Neurological: Negative for dizziness, focal weakness, loss of consciousness and headaches.   Endo/Heme/Allergies: Does not bruise/bleed easily.   Psychiatric/Behavioral: Negative for depression. The patient is nervous/anxious.    All other systems reviewed and are negative.     Physical Exam  Laboratory/Imaging   Hemodynamics  Temp (24hrs), Av.7 °C (98.1 °F), Min:36.2 °C (97.2 °F), Max:37.1 °C (98.7 °F)   Temperature: 36.8 °C (98.3 °F)  Pulse  Av.2  Min: 78  Max: 124    Blood Pressure: 144/67      Respiratory      Respiration: 20, Pulse Oximetry: 90 %     Work Of Breathing / Effort: Mild  RUL Breath Sounds: Clear, RML Breath Sounds: Clear;Diminished, RLL Breath Sounds: Clear;Diminished, TENZIN Breath Sounds: Clear, LLL  Breath Sounds: Clear;Diminished    Fluids    Intake/Output Summary (Last 24 hours) at 01/27/18 1814  Last data filed at 01/27/18 1400   Gross per 24 hour   Intake             1244 ml   Output             2100 ml   Net             -856 ml       Nutrition  Orders Placed This Encounter   Procedures   • Diet Order     Standing Status:   Standing     Number of Occurrences:   1     Order Specific Question:   Diet:     Answer:   Regular [1]     Physical Exam   Constitutional: She is oriented to person, place, and time. She appears well-developed and well-nourished. No distress.   obese   HENT:   Head: Normocephalic and atraumatic.   Mouth/Throat: No oropharyngeal exudate.   Nodule left forehead   Eyes: EOM are normal. Pupils are equal, round, and reactive to light. Right eye exhibits no discharge. Left eye exhibits no discharge. No scleral icterus.   Neck: Normal range of motion. Neck supple. No thyromegaly present.   Cardiovascular: Normal rate, regular rhythm, normal heart sounds and intact distal pulses.    No murmur heard.  Pulmonary/Chest: Effort normal. No respiratory distress. She has no wheezes. She has no rales.   Minimally diminished breath sounds-no rales/rhonchi   Abdominal: Soft. Bowel sounds are normal. She exhibits no distension and no mass. There is tenderness (right upper quadrant, with hepatomegaly). There is no rebound and no guarding.   Obese   Musculoskeletal: Normal range of motion. She exhibits edema (trace). She exhibits no tenderness.   Neurological: She is alert and oriented to person, place, and time. No cranial nerve deficit.   Skin: Skin is warm and dry. No rash noted.   Psychiatric:   Anxious   Nursing note and vitals reviewed.      Recent Labs      01/25/18   0440  01/26/18   0930  01/27/18   0432   WBC  10.0  9.4  6.7   RBC  4.28  3.29*  3.18*   HEMOGLOBIN  11.2*  8.8*  8.5*   HEMATOCRIT  34.7*  27.1*  26.5*   MCV  81.1*  82.4  83.3   MCH  26.2*  26.7*  26.7*   MCHC  32.3*  32.5*  32.1*    RDW  65.7*  67.5*  69.0*   PLATELETCT  310  243  227   MPV  10.8  10.6  10.9     Recent Labs      01/25/18   0440  01/26/18   0930  01/27/18   0432   SODIUM  135  137  139   POTASSIUM  3.5*  3.1*  3.5*   CHLORIDE  99  110  112   CO2  22  21  23   GLUCOSE  121*  142*  105*   BUN  8  <5*  5*   CREATININE  0.91  0.81  0.73   CALCIUM  9.0  8.1*  8.2*     Recent Labs      01/25/18   0440   APTT  24.7   INR  0.96     Recent Labs      01/25/18   0440   BNPBTYPENAT  27     Recent Labs      01/25/18   0815   TRIGLYCERIDE  177*   HDL  30*   LDL  198*          Assessment/Plan     Hypomagnesemia   Assessment & Plan    2g IV        Essential hypertension- (present on admission)   Assessment & Plan    -not well controlled, start lisinopril 20 mg daily, adjust PRN        Fatty liver- (present on admission)   Assessment & Plan    -encourage weight loss, avoid statins/hepatotoxic medications at this time  Discussed EtOH as well        Elevated LFTs- (present on admission)   Assessment & Plan    -AB U/S shows fatty liver  SGOT>SGPT, hypoKalemia Hypo Magnesemia  Normal hepatitis serologies- consider EtOH as well          Acute bronchitis- (present on admission)   Assessment & Plan    -improving  -continue current therapies, IV abx's, F/U all cultures  -RT protocol, wean o2, should be off by tomorrow        Sarcoidosis (CMS-HCC)- (present on admission)   Assessment & Plan    -quiescent at this time        Hypokalemia- (present on admission)   Assessment & Plan    Replacement increased  Replace magnesium        Anemia- (present on admission)   Assessment & Plan    Chronic hx, ? hemoconcentrated on admit> f/u PCP        Obesity- (present on admission)   Assessment & Plan    BMI 37.7  Sugar of 142 on chemistry  Check A1c            Reviewed items::  Labs reviewed and Medications reviewed  Anderson catheter::  No Anderson  DVT prophylaxis pharmacological::  Enoxaparin (Lovenox)  Ulcer Prophylaxis::  Not indicated

## 2018-01-28 NOTE — PROGRESS NOTES
Received report from day shift nurse. Assumed pt care at 1915. Pt is A&Ox4, resting comfortably in bed. Pt on r.a.. No signs of SOB/respiratory distress noted. Labs noted, VSS. Pt denied pain. Will returns for night time meds and assessment. Fall precautions in place. Bed locked & at lowest position. Call light and personal belongings within reach, encouraged pt to call for any assistance. Continue to monitor

## 2018-01-28 NOTE — PROGRESS NOTES
Discharged with  earlier today.  Aware of upcoming follow-up appointments and prescriptions to be picked up at the pharmacy.

## 2018-01-28 NOTE — CARE PLAN
Problem: Infection  Goal: Will remain free from infection  Outcome: PROGRESSING AS EXPECTED  Pt is afebrile at this moment (98.3*F), recent WBCs is 6.7  Pt is on oral abx Doxycycline 100mg q12 hours     Problem: Respiratory:  Goal: Respiratory status will improve  Outcome: PROGRESSING AS EXPECTED  Pt is weaning off O2, on r.a at 93%; no labored breathing noted   Pt on scheduled Symbicort x2/day and prn RT treatment

## 2018-01-28 NOTE — DISCHARGE INSTRUCTIONS
Discharge Instructions    Discharged to home by car with relative. Discharged via wheelchair, hospital escort: Yes.  Special equipment needed: Not Applicable    Be sure to schedule a follow-up appointment with your primary care doctor or any specialists as instructed.     Discharge Plan: Home  Influenza Vaccine Indication: Patient Refuses    I understand that a diet low in cholesterol, fat, and sodium is recommended for good health. Unless I have been given specific instructions below for another diet, I accept this instruction as my diet prescription.   Other diet: Regular    Bronchitis is the body's way of reacting to injury and/or infection (inflammation) of the bronchi. Bronchi are the air tubes that extend from the windpipe into the lungs. If the inflammation becomes severe, it may cause shortness of breath.  CAUSES   Inflammation may be caused by:  · A virus.  · Germs (bacteria).  · Dust.  · Allergens.  · Pollutants and many other irritants.  The cells lining the bronchial tree are covered with tiny hairs (cilia). These constantly beat upward, away from the lungs, toward the mouth. This keeps the lungs free of pollutants. When these cells become too irritated and are unable to do their job, mucus begins to develop. This causes the characteristic cough of bronchitis. The cough clears the lungs when the cilia are unable to do their job. Without either of these protective mechanisms, the mucus would settle in the lungs. Then you would develop pneumonia.  Smoking is a common cause of bronchitis and can contribute to pneumonia. Stopping this habit is the single most important thing you can do to help yourself.  TREATMENT   · Your caregiver may prescribe an antibiotic if the cough is caused by bacteria. Also, medicines that open up your airways make it easier to breathe. Your caregiver may also recommend or prescribe an expectorant. It will loosen the mucus to be coughed up. Only take over-the-counter or  prescription medicines for pain, discomfort, or fever as directed by your caregiver.  · Removing whatever causes the problem (smoking, for example) is critical to preventing the problem from getting worse.  · Cough suppressants may be prescribed for relief of cough symptoms.  · Inhaled medicines may be prescribed to help with symptoms now and to help prevent problems from returning.  · For those with recurrent (chronic) bronchitis, there may be a need for steroid medicines.  SEEK IMMEDIATE MEDICAL CARE IF:   · During treatment, you develop more pus-like mucus (purulent sputum).  · You have a fever.  · Your baby is older than 3 months with a rectal temperature of 102° F (38.9° C) or higher.  · Your baby is 3 months old or younger with a rectal temperature of 100.4° F (38° C) or higher.  · You become progressively more ill.  · You have increased difficulty breathing, wheezing, or shortness of breath.  It is necessary to seek immediate medical care if you are elderly or sick from any other disease.  MAKE SURE YOU:   · Understand these instructions.  · Will watch your condition.  · Will get help right away if you are not doing well or get worse.  Document Released: 12/18/2006 Document Revised: 03/11/2013 Document Reviewed: 10/27/2009  ExitCare® Patient Information ©2014 [employer].      Depression / Suicide Risk    As you are discharged from this [facility] facility, it is important to learn how to keep safe from harming yourself.    Recognize the warning signs:  · Abrupt changes in personality, positive or negative- including increase in energy   · Giving away possessions  · Change in eating patterns- significant weight changes-  positive or negative  · Change in sleeping patterns- unable to sleep or sleeping all the time   · Unwillingness or inability to communicate  · Depression  · Unusual sadness, discouragement and loneliness  · Talk of wanting to die  · Neglect of personal appearance   · Rebelliousness-  reckless behavior  · Withdrawal from people/activities they love  · Confusion- inability to concentrate     If you or a loved one observes any of these behaviors or has concerns about self-harm, here's what you can do:  · Talk about it- your feelings and reasons for harming yourself  · Remove any means that you might use to hurt yourself (examples: pills, rope, extension cords, firearm)  · Get professional help from the community (Mental Health, Substance Abuse, psychological counseling)  · Do not be alone:Call your Safe Contact- someone whom you trust who will be there for you.  · Call your local CRISIS HOTLINE 275-8397 or 628-485-5162  · Call your local Children's Mobile Crisis Response Team [place] (543) 765-5942 or www.Mi-Pay  · Call the toll free National Suicide Prevention Hotlines   · National Suicide Prevention Lifeline 889-058-IDCC (7416)  · National Hope Line Network 800-SUICIDE (825-9079)

## 2018-01-29 NOTE — DISCHARGE SUMMARY
"CHIEF COMPLAINT ON ADMISSION  Chief Complaint   Patient presents with   • Cough     for the last month, with symptoms exacerbating to chest pain described as \"an elephant sitting on my chest\" and pain to upper torso described as \"needles all over my body\" and pain to \"vaginal area\" pt has been monitoring these symtoms for the last month without any relief.          CODE STATUS  Prior    HPI & HOSPITAL COURSE  This is a 47 y.o. female w/ PMH sarcodiosis here with Bronchitis, respiratory failure and sepsis. She was hypertensive and meds were titrated during her stay.  Her A1c was borderline at 6. She was eventually able to be weaned off of oxygen. Her cough is significantly improved. She will follow up with primary care for blood pressure and impaired glucose tolerance management. We discussed importance of weight loss in terms of her fatty liver and glucose tolerance.    The patient met 2-midnight criteria for an inpatient stay at the time of discharge.    Therefore, she is discharged in good and stable condition with close outpatient follow-up.    DISCHARGE PROBLEM LIST  Active Problems:    Obesity POA: Yes    Anemia POA: Yes    Hypokalemia POA: Yes    Sarcoidosis (CMS-HCC) POA: Yes    Acute bronchitis POA: Yes    Elevated LFTs POA: Yes    Fatty liver POA: Yes    Essential hypertension POA: Yes    Hypomagnesemia POA: Clinically Undetermined  Resolved Problems:    * No resolved hospital problems. *      FOLLOW UP  Future Appointments  Date Time Provider Department Center   1/30/2018 11:00 AM CARE MANAGER [facility]   2/1/2018 10:00 AM [facility]ARDO [facility]   2/16/2018 8:00 AM Reilly Calles M.D. [facility] None     Edis Rivas M.D.  [address]  [address]  [facility] [address]  [phone]            MEDICATIONS ON DISCHARGE   Joann Deshpande   Home Medication Instructions JOSE L:[id number]    Printed on:01/29/18 5474   Medication Information                      albuterol 108 (90 Base) MCG/ACT Aero " Soln inhalation aerosol  Inhale 2 Puffs by mouth every four hours as needed for Shortness of Breath.             doxycycline monohydrate (ADOXA) 100 MG tablet  Take 1 Tab by mouth every 12 hours.             ferrous sulfate 325 (65 Fe) MG tablet  Take 1 Tab by mouth every day.             guaifenesin LA (MUCINEX) 600 MG TABLET SR 12 HR  Take 1 Tab by mouth every 12 hours.             lisinopril-hydrochlorothiazide (PRINZIDE, ZESTORETIC) 20-25 MG per tablet  Take 1 Tab by mouth every day.             multivitamin (THERAGRAN) Tab  Take 1 Tab by mouth every day.                 DIET  Low sodium ,low carbohydrate    ACTIVITY  As tolerated.      CONSULTATIONS  none    ECHO  FINDINGS  Left Ventricle  Normal left ventricular size, thickness, systolic function, and   diastolic function. Normal regional wall motion. Left ventricular   ejection fraction is visually estimated to be 65%.    Right Ventricle  The right ventricle was normal in size and function.    Right Atrium  The right atrium is normal in size.  Inferior vena cava is not well   visualized.    Left Atrium  Normal left atrial size. Left atrial volume index is 13 mL/sq m.    Mitral Valve  Structurally normal mitral valve without significant stenosis or   regurgitation.    Aortic Valve  Structurally normal aortic valve without significant stenosis or   regurgitation.    Tricuspid Valve  Structurally normal tricuspid valve without significant stenosis or   regurgitation. Unable to estimate pulmonary artery pressure due to an   inadequate tricuspid regurgitant jet.    Pulmonic Valve  The pulmonic valve is not well visualized. No stenosis or regurgitation   seen.    Pericardium  Normal pericardium without effusion.    Aorta  The aortic root is normal.    LABORATORY  Lab Results   Component Value Date/Time    SODIUM 136 01/28/2018 05:50 AM    POTASSIUM 3.3 (L) 01/28/2018 05:50 AM    CHLORIDE 107 01/28/2018 05:50 AM    CO2 23 01/28/2018 05:50 AM    GLUCOSE 102 (H)  01/28/2018 05:50 AM    BUN 5 (L) 01/28/2018 05:50 AM    CREATININE 0.66 01/28/2018 05:50 AM        Lab Results   Component Value Date/Time    WBC 6.7 01/27/2018 04:32 AM    HEMOGLOBIN 8.5 (L) 01/27/2018 04:32 AM    HEMATOCRIT 26.5 (L) 01/27/2018 04:32 AM    PLATELETCT 227 01/27/2018 04:32 AM        Total time of the discharge process exceeds 32 minutes

## 2018-01-30 ENCOUNTER — PATIENT OUTREACH (OUTPATIENT)
Dept: HEALTH INFORMATION MANAGEMENT | Facility: OTHER | Age: 48
End: 2018-01-30

## 2018-01-30 LAB
BACTERIA BLD CULT: NORMAL
BACTERIA BLD CULT: NORMAL
SIGNIFICANT IND 70042: NORMAL
SIGNIFICANT IND 70042: NORMAL
SITE SITE: NORMAL
SITE SITE: NORMAL
SOURCE SOURCE: NORMAL
SOURCE SOURCE: NORMAL

## 2018-02-01 ENCOUNTER — OFFICE VISIT (OUTPATIENT)
Dept: MEDICAL GROUP | Facility: CLINIC | Age: 48
End: 2018-02-01
Payer: COMMERCIAL

## 2018-02-01 ENCOUNTER — HOSPITAL ENCOUNTER (OUTPATIENT)
Dept: LAB | Facility: MEDICAL CENTER | Age: 48
End: 2018-02-01
Attending: NURSE PRACTITIONER
Payer: COMMERCIAL

## 2018-02-01 VITALS
TEMPERATURE: 97.4 F | WEIGHT: 232 LBS | BODY MASS INDEX: 36.41 KG/M2 | HEART RATE: 112 BPM | OXYGEN SATURATION: 92 % | HEIGHT: 67 IN | RESPIRATION RATE: 16 BRPM | SYSTOLIC BLOOD PRESSURE: 98 MMHG | DIASTOLIC BLOOD PRESSURE: 60 MMHG

## 2018-02-01 DIAGNOSIS — R10.9 FLANK PAIN: ICD-10-CM

## 2018-02-01 DIAGNOSIS — E83.42 HYPOMAGNESEMIA: ICD-10-CM

## 2018-02-01 DIAGNOSIS — E87.6 HYPOKALEMIA: ICD-10-CM

## 2018-02-01 DIAGNOSIS — E66.9 OBESITY (BMI 35.0-39.9 WITHOUT COMORBIDITY): ICD-10-CM

## 2018-02-01 DIAGNOSIS — M62.838 MUSCLE SPASM: ICD-10-CM

## 2018-02-01 DIAGNOSIS — J20.9 ACUTE BRONCHITIS, UNSPECIFIED ORGANISM: ICD-10-CM

## 2018-02-01 DIAGNOSIS — K76.0 FATTY LIVER: ICD-10-CM

## 2018-02-01 DIAGNOSIS — I10 ESSENTIAL HYPERTENSION: ICD-10-CM

## 2018-02-01 DIAGNOSIS — Z09 HOSPITAL DISCHARGE FOLLOW-UP: ICD-10-CM

## 2018-02-01 PROBLEM — Z87.891 FORMER SMOKER: Status: ACTIVE | Noted: 2017-06-03

## 2018-02-01 PROBLEM — E87.1 HYPONATREMIA: Status: RESOLVED | Noted: 2017-06-03 | Resolved: 2018-02-01

## 2018-02-01 LAB
ALBUMIN SERPL BCP-MCNC: 4.5 G/DL (ref 3.2–4.9)
ALBUMIN/GLOB SERPL: 1.5 G/DL
ALP SERPL-CCNC: 80 U/L (ref 30–99)
ALT SERPL-CCNC: 51 U/L (ref 2–50)
ANION GAP SERPL CALC-SCNC: 12 MMOL/L (ref 0–11.9)
APPEARANCE UR: ABNORMAL
AST SERPL-CCNC: 65 U/L (ref 12–45)
BACTERIA #/AREA URNS HPF: ABNORMAL /HPF
BILIRUB SERPL-MCNC: 1.1 MG/DL (ref 0.1–1.5)
BILIRUB UR QL STRIP.AUTO: ABNORMAL
BUN SERPL-MCNC: 15 MG/DL (ref 8–22)
CALCIUM SERPL-MCNC: 9.8 MG/DL (ref 8.5–10.5)
CHLORIDE SERPL-SCNC: 98 MMOL/L (ref 96–112)
CO2 SERPL-SCNC: 24 MMOL/L (ref 20–33)
COLOR UR: ABNORMAL
CREAT SERPL-MCNC: 1.39 MG/DL (ref 0.5–1.4)
CULTURE IF INDICATED INDCX: YES UA CULTURE
EPI CELLS #/AREA URNS HPF: ABNORMAL /HPF
GLOBULIN SER CALC-MCNC: 3 G/DL (ref 1.9–3.5)
GLUCOSE SERPL-MCNC: 124 MG/DL (ref 65–99)
GLUCOSE UR STRIP.AUTO-MCNC: NEGATIVE MG/DL
HYALINE CASTS #/AREA URNS LPF: >20 /LPF
KETONES UR STRIP.AUTO-MCNC: ABNORMAL MG/DL
LEUKOCYTE ESTERASE UR QL STRIP.AUTO: ABNORMAL
MAGNESIUM SERPL-MCNC: 1.5 MG/DL (ref 1.5–2.5)
MICRO URNS: ABNORMAL
NITRITE UR QL STRIP.AUTO: NEGATIVE
PH UR STRIP.AUTO: 5.5 [PH]
POTASSIUM SERPL-SCNC: 3.7 MMOL/L (ref 3.6–5.5)
PROT SERPL-MCNC: 7.5 G/DL (ref 6–8.2)
PROT UR QL STRIP: 100 MG/DL
RBC # URNS HPF: ABNORMAL /HPF
RBC UR QL AUTO: ABNORMAL
SODIUM SERPL-SCNC: 134 MMOL/L (ref 135–145)
SP GR UR STRIP.AUTO: 1.02
UROBILINOGEN UR STRIP.AUTO-MCNC: 1 MG/DL
VIT B12 SERPL-MCNC: 461 PG/ML (ref 211–911)
WBC #/AREA URNS HPF: ABNORMAL /HPF
YEAST BUDDING URNS QL: PRESENT /HPF

## 2018-02-01 PROCEDURE — 81001 URINALYSIS AUTO W/SCOPE: CPT

## 2018-02-01 PROCEDURE — 36415 COLL VENOUS BLD VENIPUNCTURE: CPT

## 2018-02-01 PROCEDURE — 83735 ASSAY OF MAGNESIUM: CPT

## 2018-02-01 PROCEDURE — 82607 VITAMIN B-12: CPT

## 2018-02-01 PROCEDURE — 87086 URINE CULTURE/COLONY COUNT: CPT

## 2018-02-01 PROCEDURE — 80053 COMPREHEN METABOLIC PANEL: CPT

## 2018-02-01 PROCEDURE — 99203 OFFICE O/P NEW LOW 30 MIN: CPT | Performed by: NURSE PRACTITIONER

## 2018-02-01 RX ORDER — CYCLOBENZAPRINE HCL 5 MG
5-10 TABLET ORAL 3 TIMES DAILY PRN
Qty: 30 TAB | Refills: 0 | Status: ON HOLD | OUTPATIENT
Start: 2018-02-01 | End: 2019-03-03

## 2018-02-01 ASSESSMENT — ENCOUNTER SYMPTOMS
CONSTIPATION: 0
FALLS: 0
EYE PAIN: 0
WHEEZING: 0
NAUSEA: 0
DIZZINESS: 0
BLURRED VISION: 0
WEAKNESS: 0
SPUTUM PRODUCTION: 0
NERVOUS/ANXIOUS: 0
PALPITATIONS: 0
CHILLS: 0
FEVER: 0
ABDOMINAL PAIN: 1
FOCAL WEAKNESS: 0
BLOOD IN STOOL: 0
HEADACHES: 0
COUGH: 0
HEARTBURN: 0
MYALGIAS: 1
SHORTNESS OF BREATH: 0
FLANK PAIN: 1
VOMITING: 0
DEPRESSION: 0
DIARRHEA: 0

## 2018-02-01 NOTE — PROGRESS NOTES
Subjective:     Joann Deshpande is a 47 y.o. female who presents for Hospital Follow-up.  Chart reviewed. Discharge summary available for review: Yes   Date of discharge 1/28/2018.  48- hour post discharge RN call completed on 1/30/2018 and documented in the medical record by Rina Hayden.    HPI: Recently hospitalized for coughing for a month and started having chest pain, treated for bronchitis. Discharged with doxycycline. She reports taking medication as instructed and she has finished abx yesterday. Found transaminitis, most likely secondary to fatty liver. BMI 36. Pt has been instructed about weight loss. Prediabetes. . She reports she is working on weight loss already. She starts going to gym 5 days per week. She does not want to take any cholesterol medication. She would like to work on diet and exercise first.     EKG Sinus tachycardia. TSH 1.12. Still tachy here.     Since returning home, patient reports doing better. Coughing is improving. Still having pain. She got pain and muscle spasm to her lower back, flank area. She denied dysuria or frequency, no hematuria but her urine analysis in the hospital shows large occult blood. She has an ultrasound in the hospital but focused on gallbladder.     The patient denied increased weakness; no difficulty taking care of self at home.    New to [facility] PCP on 2/16    Patient Active Problem List    Diagnosis Date Noted   • Pneumonia, community acquired 06/02/2017     Priority: Medium   • Obesity (BMI 35.0-39.9 without comorbidity) ([facility]) 02/01/2018   • Hypomagnesemia 01/27/2018   • Fatty liver 01/26/2018   • Essential hypertension 01/26/2018   • Acute bronchitis 01/25/2018   • Elevated LFTs 01/25/2018   • Sarcoidosis (CMS-[facility]) 06/08/2017   • Obesity 06/03/2017   • Anemia 06/03/2017   • Hypokalemia 06/03/2017   • Former smoker 06/03/2017         Allergies:   Patient has no known allergies.    Social History:  Social History   Substance Use Topics   • Smoking  "status: Former Smoker     Quit date: 1/25/2018   • Smokeless tobacco: Never Used   • Alcohol use Yes        ROS:  Review of Systems   Constitutional: Negative for chills, fever and malaise/fatigue.   HENT: Negative for hearing loss and tinnitus.    Eyes: Negative for blurred vision and pain.   Respiratory: Negative for cough, sputum production, shortness of breath and wheezing.    Cardiovascular: Negative for chest pain, palpitations and leg swelling.   Gastrointestinal: Positive for abdominal pain. Negative for blood in stool, constipation, diarrhea, heartburn, melena, nausea and vomiting.   Genitourinary: Positive for flank pain. Negative for dysuria, frequency, hematuria and urgency.   Musculoskeletal: Positive for myalgias. Negative for falls.   Skin: Negative for rash.   Neurological: Negative for dizziness, focal weakness, weakness and headaches.   Psychiatric/Behavioral: Negative for depression. The patient is not nervous/anxious.         Objective:     Blood pressure (!) 98/60, pulse (!) 112, temperature 36.3 °C (97.4 °F), resp. rate 16, height 1.702 m (5' 7\"), weight 105.2 kg (232 lb), SpO2 92 %.     Physical Exam:  Physical Exam   Constitutional: She is oriented to person, place, and time and well-developed, well-nourished, and in no distress.   HENT:   Head: Normocephalic and atraumatic.   Eyes: Conjunctivae are normal.   Neck: Neck supple. No JVD present. No thyromegaly present.   Cardiovascular: Normal rate and regular rhythm.    No murmur heard.  Pulmonary/Chest: Effort normal and breath sounds normal. No respiratory distress. She has no wheezes.   Abdominal: Soft. Bowel sounds are normal. She exhibits no distension. There is tenderness (to her lower back and flank area, vague positve CVA tenderness. ).   Musculoskeletal: Normal range of motion. She exhibits no edema.   Neurological: She is alert and oriented to person, place, and time.   Skin: Skin is warm. No erythema.   Nursing note and vitals " reviewed.        Assessment and Plan:     1. Hospital discharge follow-up  Hospitalization and results reviewed with patient. High risk conditions requiring teaching or care coordination were identified and addressed.The patient demonstrate understanding of admission and underlying conditions. The patient understands discharge instructions and when to seek medical attention. Medications reviewed including instructions regarding high risk medications, dosing and side effects.    The patient is able to safely adhere to ADL/IADL, treatment and medication regimen, self-manage of high-risk conditions? Yes   The patient requires physical therapy/home health/DME referral? No   The patient requires referral to care coordination/behavioral health/social work?  No   Patient requires referral for pharmacy consult? No   Advance directive/POLST on file?  No   Required counseled on advance directive?  No     New to [facility] PCP on 2/16    2. Obesity (BMI 35.0-39.9 without comorbidity)  - Patient identified as having weight management issue.  Appropriate orders and counseling given.    3. Hypokalemia  - COMP METABOLIC PANEL; Future  - VITAMIN B12; Future    4. Acute bronchitis, unspecified organism  Finished abx    5. Fatty liver  Working on weight loss    6. Flank pain  Flank and whole lower back, muscle strain vs. Kidney stone related?  Consider renal US if not improving with flexeril. Will check UA again and follow up kidney function.  - URINALYSIS,CULTURE IF INDICATED; Future    7. Muscle spasm  - cyclobenzaprine (FLEXERIL) 5 MG tablet; Take 1-2 Tabs by mouth 3 times a day as needed.  Dispense: 30 Tab; Refill: 0    8. Hypomagnesemia  - MAGNESIUM; Future    9. Essential hypertension  - DME OTHER: BP machine. Hypotension with tachycardia today. Pt to stop taking BP medication and monitor BP at home, call us back on Monday to update the blood pressure.       Medication Reconciliation  Medication list at end of encounter:   Current  Outpatient Prescriptions   Medication Sig Dispense Refill   • cyclobenzaprine (FLEXERIL) 5 MG tablet Take 1-2 Tabs by mouth 3 times a day as needed. 30 Tab 0   • guaifenesin LA (MUCINEX) 600 MG TABLET SR 12 HR Take 1 Tab by mouth every 12 hours. 28 Tab    • albuterol 108 (90 Base) MCG/ACT Aero Soln inhalation aerosol Inhale 2 Puffs by mouth every four hours as needed for Shortness of Breath.     • multivitamin (THERAGRAN) Tab Take 1 Tab by mouth every day.     • ferrous sulfate 325 (65 Fe) MG tablet Take 1 Tab by mouth every day. 30 Tab      No current facility-administered medications for this visit.        Primary care follow-up:  New health conditions identified during hospitalization? Yes   Labs/pathology/imaging requires future PCP follow-up?  No   Changes to medications during hospitalization or today? Yes     Recommended followup: Return in about 4 weeks (around 3/1/2018), or if symptoms worsen or fail to improve, for establish with new PCP. with Edis Rivas M.D.   Future Appointments       Provider Department [place]    2/1/2018 10:00 AM RICARDO [facility]    2/16/2018 8:00 AM Reilly Calles M.D. [facility] - Internal Medicine           Patient Instruction  Patient offered educational material on discharge diagnosis and management of symptoms/red flags. Patient instructed to keep follow-up appointments and to bring written questions and and actual medications to each office visit. Patient instructed to call PCP/specialist with any problems/questions/concerns. Patient verbalizes understanding and has no further questions at this time.    Face-to-face transitional care management services with high complexity medical decision making.  Total time spent was 40 minutes with >50% of time spent on counseling and coordination of care.

## 2018-02-01 NOTE — PROGRESS NOTES
POST DISCHARGE CALL MADE BY Rina Hayden.  Discharge Date:1/28/2018   Date of Outreach Call: 1/30/2018 11:16 AM  Now that you're home, how are you doing? Good  Do you have questions about your medications? No    Did you fill your medications? Yes    Do you have a follow-up appointment scheduled?Yes  Comment:Post discharge clinic on 2/1/18    Discharging Department: [facility]    Number of Attempts: 1  Current or previous attempts completed within two business days of discharge? Yes  Provided education regarding treatment plan, medication, self-management, ADLs? Yes  Has patient completed Advance Directive? If yes, advise them to bring to appointment. No  Care Manager phone number provided? Yes  Is there anything else I can help you with? No;ll

## 2018-02-01 NOTE — PATIENT INSTRUCTIONS
Dyslipidemia  Dyslipidemia is an imbalance of the lipids in your blood. Lipids are waxy, fat-like proteins that your body needs in small amounts. Dyslipidemia often involves the lipids cholesterol or triglycerides. Common forms of dyslipidemia are:  · High levels of bad cholesterol (LDL cholesterol). LDL cholesterol is the type of cholesterol that causes heart disease.  · Low levels of good cholesterol (HDL cholesterol). HDL cholesterol is the type of cholesterol that helps protect against heart disease.  · High levels of triglycerides. Triglycerides are a fatty substance in the blood linked to a buildup of plaque on your arteries.  RISK FACTORS  · Increased age.  · Having a family history of high cholesterol.  · Certain medicines, including birth control pills, diuretics, beta-blockers, and some medicines for depression.  · Smoking.  · Eating a high-fat diet.  · Being overweight.  · Medical conditions such as diabetes, polycystic ovary syndrome, pregnancy, kidney disease, and hypothyroidism.  · Lack of regular exercise.  SIGNS AND SYMPTOMS  There are no signs or symptoms with dyslipidemia.  DIAGNOSIS  A simple blood test called a fasting blood test can be done to determine your level of:  · Total cholesterol. This is the combined number of LDL cholesterol and HDL cholesterol. A healthy number is lower than 200.  · LDL cholesterol. The goal number for LDL cholesterol is different for each person depending on risk factors. Ask your health care provider what your LDL cholesterol number should be.  · HDL cholesterol. A healthy level of HDL cholesterol is 60 or higher. A number lower than 40 for men or 50 for women is a danger sign.  · Triglycerides. A healthy triglyceride number is less than 150.  TREATMENT  Dyslipidemia is a treatable condition. Your health care provider will advise you on what type of treatment is best based on your age, your test results, and current guidelines. Treatment may include:  · Dietary  changes. A dietitian may help you create a diet that is based on your risk factors, conditions, and lifestyle.  · Regular exercise. This can help lower your LDL cholesterol, raise your HDL cholesterol, and help with weight management. Check with your health care provider before beginning an exercise program. Most people should participate in 30 minutes of brisk exercise 5 days a week.  · Quitting smoking.  · Medicines to lower LDL cholesterol and triglycerides.  · If you have high levels of triglycerides, your health care provider may:  ¨ Have you stop drinking alcohol.  ¨ Have you restrict your fat intake.  ¨ Have you eliminate refined sugars from your diet.  ¨ Treat you for other conditions, such as underactive thyroid gland (hypothyroidism) and high blood sugar (hyperglycemia).  Your health care provider will monitor your lipid levels with regular blood tests.  HOME CARE INSTRUCTIONS  · Eat a healthy diet. Follow any diet instructions if they were given to you by your health care provider.  · Maintain a healthy weight.  · Exercise regularly based on the recommendations of your health care provider.  · Do not use any tobacco products, including cigarettes, chewing tobacco, or electronic cigarettes.  · Take medicines only as directed by your health care provider.  · Keep all follow-up visits as directed by your health care provider.  SEEK MEDICAL CARE IF:  You are having possible side effects from your medicines.     This information is not intended to replace advice given to you by your health care provider. Make sure you discuss any questions you have with your health care provider.     Document Released: 12/23/2014 Document Revised: 01/08/2016 Document Reviewed: 12/23/2014  [employer] Interactive Patient Education ©2016 [employer]      Muscle Cramps and Spasms  Muscle cramps and spasms occur when a muscle or muscles tighten and you have no control over this tightening (involuntary muscle contraction). They are  a common problem and can develop in any muscle. The most common place is in the calf muscles of the leg. Both muscle cramps and muscle spasms are involuntary muscle contractions, but they also have differences:   · Muscle cramps are sporadic and painful. They may last a few seconds to a quarter of an hour. Muscle cramps are often more forceful and last longer than muscle spasms.  · Muscle spasms may or may not be painful. They may also last just a few seconds or much longer.  CAUSES   It is uncommon for cramps or spasms to be due to a serious underlying problem. In many cases, the cause of cramps or spasms is unknown. Some common causes are:   · Overexertion.    · Overuse from repetitive motions (doing the same thing over and over).    · Remaining in a certain position for a long period of time.    · Improper preparation, form, or technique while performing a sport or activity.    · Dehydration.    · Injury.    · Side effects of some medicines.    · Abnormally low levels of the salts and ions in your blood (electrolytes), especially potassium and calcium. This could happen if you are taking water pills (diuretics) or you are pregnant.    Some underlying medical problems can make it more likely to develop cramps or spasms. These include, but are not limited to:   · Diabetes.    · Parkinson disease.    · Hormone disorders, such as thyroid problems.    · Alcohol abuse.    · Diseases specific to muscles, joints, and bones.    · Blood vessel disease where not enough blood is getting to the muscles.    HOME CARE INSTRUCTIONS   · Stay well hydrated. Drink enough water and fluids to keep your urine clear or pale yellow.  · It may be helpful to massage, stretch, and relax the affected muscle.  · For tight or tense muscles, use a warm towel, heating pad, or hot shower water directed to the affected area.  · If you are sore or have pain after a cramp or spasm, applying ice to the affected area may relieve discomfort.  ¨ Put  ice in a plastic bag.  ¨ Place a towel between your skin and the bag.  ¨ Leave the ice on for 15-20 minutes, 03-04 times a day.  · Medicines used to treat a known cause of cramps or spasms may help reduce their frequency or severity. Only take over-the-counter or prescription medicines as directed by your caregiver.  SEEK MEDICAL CARE IF:   Your cramps or spasms get more severe, more frequent, or do not improve over time.   MAKE SURE YOU:   · Understand these instructions.  · Will watch your condition.  · Will get help right away if you are not doing well or get worse.     This information is not intended to replace advice given to you by your health care provider. Make sure you discuss any questions you have with your health care provider.     Document Released: 06/09/2003 Document Revised: 04/14/2014 Document Reviewed: 12/04/2013  [employer] Interactive Patient Education ©2016 [employer]      If you need further assistance, or have any questions; concerns or lingering symptoms before seeing your Primary Care Provider or specialist.     Do not hesitate to contact us.     Please contact us at the Post-Hospital Follow Up Program at [phone].   Our offices hours are Monday-Friday 8 am-5 pm.

## 2018-02-02 ENCOUNTER — TELEPHONE (OUTPATIENT)
Dept: MEDICAL GROUP | Facility: CLINIC | Age: 48
End: 2018-02-02

## 2018-02-02 DIAGNOSIS — B37.9 YEAST INFECTION: ICD-10-CM

## 2018-02-02 DIAGNOSIS — N17.9 AKI (ACUTE KIDNEY INJURY) (HCC): ICD-10-CM

## 2018-02-02 DIAGNOSIS — N30.01 ACUTE CYSTITIS WITH HEMATURIA: ICD-10-CM

## 2018-02-02 RX ORDER — FLUCONAZOLE 150 MG/1
150 TABLET ORAL DAILY
Qty: 1 TAB | Refills: 0 | Status: SHIPPED | OUTPATIENT
Start: 2018-02-02 | End: 2018-02-03

## 2018-02-02 RX ORDER — CIPROFLOXACIN 500 MG/1
500 TABLET, FILM COATED ORAL 2 TIMES DAILY
Qty: 14 TAB | Refills: 0 | Status: SHIPPED | OUTPATIENT
Start: 2018-02-02 | End: 2018-02-09

## 2018-02-02 NOTE — TELEPHONE ENCOUNTER
Called patient and informed of provider's message below regarding urine culture preliminary results and need for antibiotics.   Outcome: Patient voiced understanding of results and prescriptions sent to pharmacy, and agreed to provider's recommendations to start antibiotics.

## 2018-02-02 NOTE — TELEPHONE ENCOUNTER
----- Message from RICARDO Russell sent at 2/2/2018  1:08 PM PST -----  Please call pt to inform her that her initial urine test shows yeast infection and possible bacterial type infection. I am still waiting for final result of urine culture but I think she should start taking another antibiotic working for urinary tract infection. Will also send one dose of diflucan to take care of yeast infection. If she is still not feeling better after taking antibiotic and anti yeast medication, go get an kidney US done. The order is in the system already. Please give her instruction how to schedule US.     Thanks.   CLARK (Rita), APRN

## 2018-02-03 LAB
BACTERIA UR CULT: NORMAL
SIGNIFICANT IND 70042: NORMAL
SITE SITE: NORMAL
SOURCE SOURCE: NORMAL

## 2018-02-06 ENCOUNTER — TELEPHONE (OUTPATIENT)
Dept: MEDICAL GROUP | Facility: CLINIC | Age: 48
End: 2018-02-06

## 2018-02-06 DIAGNOSIS — B37.31 VULVOVAGINAL CANDIDIASIS: ICD-10-CM

## 2018-02-06 NOTE — TELEPHONE ENCOUNTER
Spoke to patient and she stated the only symptom that she still having is vaginal itching.  Since it was not specify when to take diflucan she took diflucan when she started taking the antibiotic.

## 2018-02-06 NOTE — DOCUMENTATION QUERY
DOCUMENTATION QUERY    PROVIDERS: Please select “Cosign w/ note” to reply to query.    To better represent the severity of illness of your patient and to clarify the medical record, please review the following information and exercise your independent professional judgment in responding to this query.     Sepsis is noted in a progress note dated 1/26/18. On admission patient was noted to have tachycardia, metabolic and lactic acidosis, and acute bronchitis.    Based upon the clinical findings, risk factors, and treatment, can the diagnosis of sepsis be confirmed, and can any associated relationship(s) be further specified?    Please select all that apply:   • Sepsis present on admission (specify causative organism if known and any associated organ dysfunction if known)  • Sepsis developed after admission (specify causative organism if known and any associated organ dysfunction if known)  • Sepsis has been ruled out  • Other explanation of clinical findings (please document)  • Unable to determine      The medical record reflects the following:   Clinical Findings FROM HISTORY & PHYSICAL  Assessment/Plan:     Acute bronchitis  Shortness of breath  Elevated LFTs  Lactic acidosis  Chest pain  Sarcoidosis  Anemia     Patient with productive cough and shortness of breath but no evidence of pneumonia on CXR. Continue IV Rocephin and Zithromax  Duoneb tx prn SOB. Considering hx of sarcoidosis, check Echo to evaluate ventricular function  LFTs are elevated but gallbladder US only shows fatty liver. There is no cholecystitis or bile duct obstruction seen. Check hepatitis panel  Lactic acid is elevated. Started on IV fluids. Monitor lactic acid level  Initial troponin is negative and she has no chest pain at this time. Monitor serial troponins      FROM PROGRESS NOTE 1/26/18  Chief Complaint  47 y.o. female admitted 1/25/2018 with bronchitis and sepsis.    FROM DISCHARGE SUMMARY  HPI & HOSPITAL COURSE  This is a 47 y.o.  female w/ PMH sarcodiosis here with Bronchitis, respiratory failure and sepsis. She was hypertensive and meds were titrated during her stay. Her A1c was borderline at 6. She was eventually able to be weaned off of oxygen. Her cough is significantly improved. She will follow up with primary care for blood pressure and impaired glucose tolerance management. We discussed importance of weight loss in terms of her fatty liver and glucose tolerance      Treatment FROM ED PROVIDER NOTE  Plan EKG, chest x-ray, labs, steroids, DuoNeb treatment, with concern for sepsis and dehydration given tachycardic and dry mucous membranes on examination will aggressively rehydrate with 30 mL/kg IV fluid bolus.      Risk Factors    Location within medical record ED PROVIDER NOTE, HISTORY & PHYSICAL, PROGRESS NOTE 1/26/18, DISCHARGE SUMMARY       Thank you,   Genesis Sepulveda, CRC, CCA  Coding  1

## 2018-02-06 NOTE — TELEPHONE ENCOUNTER
----- Message from RICARDO Russell sent at 2/5/2018  4:19 PM PST -----  Result reviewed. Please call patient to inform her that culture result showing mixed skin jennifer. If she has felt better, no further intervention needed.     Thanks.  CLARK (Rita), APRN

## 2018-02-07 NOTE — TELEPHONE ENCOUNTER
I have sent vaginal cream for her to try to see if this is helping to alleviate her symptoms.     Thanks.   Marisol

## 2018-02-16 ENCOUNTER — OFFICE VISIT (OUTPATIENT)
Dept: INTERNAL MEDICINE | Facility: MEDICAL CENTER | Age: 48
End: 2018-02-16
Payer: COMMERCIAL

## 2018-02-16 VITALS
DIASTOLIC BLOOD PRESSURE: 82 MMHG | WEIGHT: 231.48 LBS | HEIGHT: 67 IN | TEMPERATURE: 96.9 F | BODY MASS INDEX: 36.33 KG/M2 | SYSTOLIC BLOOD PRESSURE: 116 MMHG | HEART RATE: 89 BPM | OXYGEN SATURATION: 92 %

## 2018-02-16 DIAGNOSIS — Z09 HOSPITAL DISCHARGE FOLLOW-UP: ICD-10-CM

## 2018-02-16 DIAGNOSIS — E87.6 HYPOKALEMIA: ICD-10-CM

## 2018-02-16 DIAGNOSIS — E83.51 HYPOCALCEMIA: ICD-10-CM

## 2018-02-16 DIAGNOSIS — R79.89 ELEVATED LFTS: ICD-10-CM

## 2018-02-16 DIAGNOSIS — E66.9 OBESITY (BMI 35.0-39.9 WITHOUT COMORBIDITY): ICD-10-CM

## 2018-02-16 DIAGNOSIS — E83.42 HYPOMAGNESEMIA: ICD-10-CM

## 2018-02-16 DIAGNOSIS — R79.89 ELEVATED SERUM CREATININE: ICD-10-CM

## 2018-02-16 DIAGNOSIS — D50.9 IRON DEFICIENCY ANEMIA, UNSPECIFIED IRON DEFICIENCY ANEMIA TYPE: ICD-10-CM

## 2018-02-16 PROCEDURE — 99204 OFFICE O/P NEW MOD 45 MIN: CPT | Mod: GC | Performed by: INTERNAL MEDICINE

## 2018-02-16 ASSESSMENT — PATIENT HEALTH QUESTIONNAIRE - PHQ9
5. POOR APPETITE OR OVEREATING: 2 - MORE THAN HALF THE DAYS
SUM OF ALL RESPONSES TO PHQ QUESTIONS 1-9: 10
CLINICAL INTERPRETATION OF PHQ2 SCORE: 3

## 2018-02-16 NOTE — LETTER
[facility]  Reilly Calles M.D.  [address]  [place] [address]  Fax: [phone]   Authorization for Release/Disclosure of   Protected Health Information   Name: MATILDA FERRARI : 1970 SSN: [id number]   Address: [facility] [place] [address] Phone:    [phone] (home)    I authorize the entity listed below to release/disclose the PHI below to:   [facility]/Reilly Calles M.D. and Reilly Calles M.D.   Provider or Entity Name:                                                            [facility]     Address   [facility], [facility], [facility]   Phone: [phone]      Fax: [phone]     Reason for request: continuity of care   Information to be released:    [  ] LAST COLONOSCOPY,  including any PATH REPORT and follow-up  [  ] LAST FIT/COLOGUARD RESULT [  ] LAST DEXA  [  ] LAST MAMMOGRAM  [  ] LAST PAP  [  ] LAST LABS [  ] RETINA EXAM REPORT  [  ] IMMUNIZATION RECORDS  [X] Release all info      [  ] Check here and initial the line next to each item to release ALL health information INCLUDING  _____ Care and treatment for drug and / or alcohol abuse  _____ HIV testing, infection status, or AIDS  _____ Genetic Testing    DATES OF SERVICE OR TIME PERIOD TO BE DISCLOSED: _____________  I understand and acknowledge that:  * This Authorization may be revoked at any time by you in writing, except if your health information has already been used or disclosed.  * Your health information that will be used or disclosed as a result of you signing this authorization could be re-disclosed by the recipient. If this occurs, your re-disclosed health information may no longer be protected by State or Federal laws.  * You may refuse to sign this Authorization. Your refusal will not affect your ability to obtain treatment.  * This Authorization becomes effective upon signing and will  on (date) __________.      If no date is indicated, this Authorization will  one (1)  year from the signature date.    Name: Joann Deshpande    Signature:   Date:     2/16/2018       PLEASE FAX REQUESTED RECORDS BACK TO: [phone]

## 2018-02-16 NOTE — PROGRESS NOTES
New Patient to Establish    Reason to establish: New patient to establish    CC: Hospital follow-up, iron deficiency anemia, abnormal electrolytes, elevated liver function tests, elevated serum creatinine, and obesity    HPI: Mrs. Deshpande is a 47-year-old female with a past medical history of sarcoidosis who presents to the clinic today following recent discharge from the hospital, patient was hospitalized with bronchitis, respiratory failure, and sepsis. Patient was hypertensive and treated medically, her A1c was borderline at 6, patient was eventually weaned off her oxygen, and during hospital stay her cough improved significantly. Patient was discharged with a hospital follow-up.    Patient presents to the clinic today with no acute complaints, patient states that since her hospitalization all of her symptoms have improved.     On review of her lab records patient had an anemia with a low MCV, patient was discharged with iron sulfate. Patient further relates that she has very heavy periods. She relates feeling weak and tired most of the time.    For healthcare maintenance, patient is going to have her tetanus and influenza administered at her pharmacy. Patient had a Pap smear 2 years ago which was negative. Patient does not require mammogram at this time, however she does state that she recently had a mammogram done because they were offering them for free.    Patient Active Problem List    Diagnosis Date Noted   • Pneumonia, community acquired 06/02/2017     Priority: Medium   • Elevated serum creatinine 02/16/2018   • Obesity (BMI 35.0-39.9 without comorbidity) (Formerly [facility]) 02/01/2018   • Hypomagnesemia 01/27/2018   • Fatty liver 01/26/2018   • Essential hypertension 01/26/2018   • Acute bronchitis 01/25/2018   • Elevated LFTs 01/25/2018   • Sarcoidosis (CMS-[facility]) 06/08/2017   • Obesity 06/03/2017   • Anemia 06/03/2017   • Hypokalemia 06/03/2017   • Former smoker 06/03/2017       Past Medical History:   Diagnosis Date    • Anemia    • Sarcoidosis (CMS-HCC) 2005       Current Outpatient Prescriptions   Medication Sig Dispense Refill   • magnesium oxide (MAG-OX) 400 (241.3 Mg) MG Tab tablet Take 1 Tab by mouth every day. 60 Tab 0   • cyclobenzaprine (FLEXERIL) 5 MG tablet Take 1-2 Tabs by mouth 3 times a day as needed. 30 Tab 0   • ferrous sulfate 325 (65 Fe) MG tablet Take 1 Tab by mouth every day. 30 Tab    • guaifenesin LA (MUCINEX) 600 MG TABLET SR 12 HR Take 1 Tab by mouth every 12 hours. 28 Tab    • albuterol 108 (90 Base) MCG/ACT Aero Soln inhalation aerosol Inhale 2 Puffs by mouth every four hours as needed for Shortness of Breath.     • multivitamin (THERAGRAN) Tab Take 1 Tab by mouth every day.       No current facility-administered medications for this visit.        Allergies as of 02/16/2018   • (No Known Allergies)       Social History     Social History   • Marital status:      Spouse name: N/A   • Number of children: N/A   • Years of education: N/A     Occupational History   • Not on file.     Social History Main Topics   • Smoking status: Former Smoker     Packs/day: 1.00     Years: 23.00     Quit date: 1/25/2018   • Smokeless tobacco: Never Used   • Alcohol use 1.2 - 1.8 oz/week     2 - 3 Shots of liquor per week      Comment: occasionally   • Drug use: No   • Sexual activity: Yes     Partners: Male     Birth control/ protection: Surgical     Other Topics Concern   • Not on file     Social History Narrative   • No narrative on file       Family History   Problem Relation Age of Onset   • Hypertension Mother    • Psychiatry Mother    • Stroke Father    • Diabetes Father    • Cancer Maternal Grandmother    • Diabetes Paternal Grandmother    • Lung Disease Paternal Grandfather        Past Surgical History:   Procedure Laterality Date   • PRIMARY C SECTION         ROS: As per HPI. Additional pertinent symptoms as noted below.    All others negative    /82   Pulse 89   Temp 36.1 °C (96.9 °F)   Ht 1.689 m  "(5' 6.5\")   Wt 105 kg (231 lb 7.7 oz)   SpO2 92%   BMI 36.80 kg/m²     Physical Exam   Constitutional: She is oriented to person, place, and time. She appears well-developed and well-nourished. No distress.   HENT:   Head: Normocephalic and atraumatic.   Right Ear: External ear normal.   Left Ear: External ear normal.   Nose: Nose normal.   Mouth/Throat: Oropharynx is clear and moist.   Eyes: EOM are normal. Pupils are equal, round, and reactive to light. Right eye exhibits no discharge. Left eye exhibits no discharge.   Neck: Normal range of motion. Neck supple. No thyromegaly present.   Cardiovascular: Normal rate, regular rhythm and normal heart sounds.    Pulmonary/Chest: Effort normal and breath sounds normal. No respiratory distress.   Abdominal: Soft. Normal appearance and bowel sounds are normal. There is no CVA tenderness.   Stretch marks visible on exam   Musculoskeletal: Normal range of motion. She exhibits no edema, tenderness or deformity.   Neurological: She is alert and oriented to person, place, and time. No cranial nerve deficit. Coordination normal.   Skin: Skin is warm and dry. She is not diaphoretic. No erythema.   Psychiatric: She has a normal mood and affect. Her behavior is normal. Judgment and thought content normal.         Note: I have reviewed all pertinent labs and diagnostic tests associated with this visit with specific comments listed under the assessment and plan below    Assessment and Plan    1. Hospital discharge follow-up  Patient was recently hospitalized for bronchitis, respiratory failure, and sepsis. Since discharge patient has improved.  Plan  -Continue to monitor    2. Iron deficiency anemia, unspecified iron deficiency anemia type  Patient's baseline hemoglobin is at an 8, she has a low MCV. Patient also complains of having heavy periods. This could be an iron deficiency anemia.  Plan  -CBC, occult blood feces, and iron studies ordered  -Patient to follow-up in 5 " weeks    3. Hypomagnesemia  On lab review patient had low magnesium levels.  Plan  -Patient started on mag oxide  -Patient to have mag levels drawn prior to next visit    4. Hypokalemia  On patient's last lab results she had hypokalemia.  Plan  -CMP ordered follow-up    5. Elevated LFTs  Patient has a history of elevated liver function tests, on patient's previous past medical history it was noted this could be due to fatty liver disease. However, her liver function tests seem to be trending down.  Plan  -CMP ordered to monitor    6. Elevated serum creatinine  On patient's last CMP her BUN/creatinine creatinine almost doubled, this could be secondary to hemoconcentration due to dehydration.  Plan  -Patient to follow-up in 5 weeks with CMP    7. Obesity (BMI 35.0-39.9 without comorbidity) (HCC)  Patient was counseled about losing weight, she states that she recently joined a gym and has been consistently going and is trying to diet as well. Patient's ASCVD score at this time is 3.4% so a statin is not indicated.  Plan  -Continue to monitor    8. Hypocalcemia  Patient also has a history of hypocalcemia.  Plan  -Vitamin D and phosphorus levels ordered      Followup: Return in about 5 weeks (around 3/23/2018), or if symptoms worsen or fail to improve, for Short.    Risk Assessment (discuss potential complications a function of chronic problems): Patient is low risk at this time    Complexity (discuss number of co-morbidities): Low complexity    Signed by: Reilly Calles M.D.

## 2018-02-16 NOTE — PATIENT INSTRUCTIONS
Iron Deficiency Anemia, Adult  Anemia is a condition in which there are less red blood cells or hemoglobin in the blood than normal. Hemoglobin is the part of red blood cells that carries oxygen. Iron deficiency anemia is anemia caused by too little iron. It is the most common type of anemia. It may leave you tired and short of breath.  CAUSES   · Lack of iron in the diet.  · Poor absorption of iron, as seen with intestinal disorders.  · Intestinal bleeding.  · Heavy periods.  SIGNS AND SYMPTOMS   Mild anemia may not be noticeable. Symptoms may include:  · Fatigue.  · Headache.  · Pale skin.  · Weakness.  · Tiredness.  · Shortness of breath.  · Dizziness.  · Cold hands and feet.  · Fast or irregular heartbeat.  DIAGNOSIS   Diagnosis requires a thorough evaluation and physical exam by your health care provider. Blood tests are generally used to confirm iron deficiency anemia. Additional tests may be done to find the underlying cause of your anemia. These may include:  · Testing for blood in the stool (fecal occult blood test).  · A procedure to see inside the colon and rectum (colonoscopy).  · A procedure to see inside the esophagus and stomach (endoscopy).  TREATMENT   Iron deficiency anemia is treated by correcting the cause of the deficiency. Treatment may involve:  · Adding iron-rich foods to your diet.  · Taking iron supplements. Pregnant or breastfeeding women need to take extra iron because their normal diet usually does not provide the required amount.  · Taking vitamins. Vitamin C improves the absorption of iron. Your health care provider may recommend that you take your iron tablets with a glass of orange juice or vitamin C supplement.  · Medicines to make heavy menstrual flow lighter.  · Surgery.  HOME CARE INSTRUCTIONS   · Take iron as directed by your health care provider.  ¨ If you cannot tolerate taking iron supplements by mouth, talk to your health care provider about taking them through a vein  (intravenously) or an injection into a muscle.  ¨ For the best iron absorption, iron supplements should be taken on an empty stomach. If you cannot tolerate them on an empty stomach, you may need to take them with food.  ¨ Do not drink milk or take antacids at the same time as your iron supplements. Milk and antacids may interfere with the absorption of iron.  ¨ Iron supplements can cause constipation. Make sure to include fiber in your diet to prevent constipation. A stool softener may also be recommended.  · Take vitamins as directed by your health care provider.  · Eat a diet rich in iron. Foods high in iron include liver, lean beef, whole-grain bread, eggs, dried fruit, and dark green leafy vegetables.  SEEK IMMEDIATE MEDICAL CARE IF:   · You faint. If this happens, do not drive. Call your local emergency services (911 in [place] if no other help is available.  · You have chest pain.  · You feel nauseous or vomit.  · You have severe or increased shortness of breath with activity.  · You feel weak.  · You have a rapid heartbeat.  · You have unexplained sweating.  · You become light-headed when getting up from a chair or bed.  MAKE SURE YOU:   · Understand these instructions.  · Will watch your condition.  · Will get help right away if you are not doing well or get worse.     This information is not intended to replace advice given to you by your health care provider. Make sure you discuss any questions you have with your health care provider.     Document Released: 12/15/2001 Document Revised: 01/08/2016 Document Reviewed: 08/25/2014  [employer] Patient Education ©2016 [employer]

## 2018-02-22 LAB — EKG IMPRESSION: NORMAL

## 2018-04-16 NOTE — TELEPHONE ENCOUNTER
Last seen: 02/16/18 by Dr. Calles  Next appt: None    Was the patient seen in the last year in this department? Yes   Does patient have an active prescription for medications requested? No   Received Request Via: Pharmacy

## 2019-03-03 ENCOUNTER — APPOINTMENT (OUTPATIENT)
Dept: RADIOLOGY | Facility: MEDICAL CENTER | Age: 49
DRG: 871 | End: 2019-03-03
Attending: EMERGENCY MEDICINE
Payer: COMMERCIAL

## 2019-03-03 ENCOUNTER — HOSPITAL ENCOUNTER (INPATIENT)
Facility: MEDICAL CENTER | Age: 49
LOS: 2 days | DRG: 871 | End: 2019-03-05
Attending: EMERGENCY MEDICINE | Admitting: INTERNAL MEDICINE
Payer: COMMERCIAL

## 2019-03-03 DIAGNOSIS — R79.89 ELEVATED TROPONIN: ICD-10-CM

## 2019-03-03 DIAGNOSIS — J18.9 MULTIFOCAL PNEUMONIA: ICD-10-CM

## 2019-03-03 DIAGNOSIS — R07.9 ACUTE CHEST PAIN: ICD-10-CM
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
## 2019-03-03 PROBLEM — I21.4 NSTEMI (NON-ST ELEVATED MYOCARDIAL INFARCTION) (HCC): Status: ACTIVE | Noted: 2019-03-03

## 2019-03-03 LAB
ALBUMIN SERPL BCP-MCNC: 4 G/DL (ref 3.2–4.9)
ALBUMIN/GLOB SERPL: 1 G/DL
ALP SERPL-CCNC: 86 U/L (ref 30–99)
ALT SERPL-CCNC: 39 U/L (ref 2–50)
ANION GAP SERPL CALC-SCNC: 11 MMOL/L (ref 0–11.9)
APTT PPP: 30.7 SEC (ref 24.7–36)
AST SERPL-CCNC: 64 U/L (ref 12–45)
BASOPHILS # BLD AUTO: 0.8 % (ref 0–1.8)
BASOPHILS # BLD: 0.11 K/UL (ref 0–0.12)
BILIRUB SERPL-MCNC: 1.7 MG/DL (ref 0.1–1.5)
BNP SERPL-MCNC: 57 PG/ML (ref 0–100)
BUN SERPL-MCNC: 8 MG/DL (ref 8–22)
CALCIUM SERPL-MCNC: 8.5 MG/DL (ref 8.4–10.2)
CHLORIDE SERPL-SCNC: 98 MMOL/L (ref 96–112)
CO2 SERPL-SCNC: 24 MMOL/L (ref 20–33)
CREAT SERPL-MCNC: 0.83 MG/DL (ref 0.5–1.4)
D DIMER PPP IA.FEU-MCNC: 0.74 UG/ML (FEU) (ref 0–0.5)
EKG IMPRESSION: NORMAL
EOSINOPHIL # BLD AUTO: 0.18 K/UL (ref 0–0.51)
EOSINOPHIL NFR BLD: 1.2 % (ref 0–6.9)
ERYTHROCYTE [DISTWIDTH] IN BLOOD BY AUTOMATED COUNT: 50.3 FL (ref 35.9–50)
FLUAV RNA SPEC QL NAA+PROBE: NEGATIVE
FLUBV RNA SPEC QL NAA+PROBE: NEGATIVE
GLOBULIN SER CALC-MCNC: 4.1 G/DL (ref 1.9–3.5)
GLUCOSE SERPL-MCNC: 118 MG/DL (ref 65–99)
HCT VFR BLD AUTO: 35 % (ref 37–47)
HGB BLD-MCNC: 11.4 G/DL (ref 12–16)
IMM GRANULOCYTES # BLD AUTO: 0.06 K/UL (ref 0–0.11)
IMM GRANULOCYTES NFR BLD AUTO: 0.4 % (ref 0–0.9)
INR PPP: 1.06 (ref 0.87–1.13)
LACTATE BLD-SCNC: 0.9 MMOL/L (ref 0.5–2)
LACTATE BLD-SCNC: 1.5 MMOL/L (ref 0.5–2)
LIPASE SERPL-CCNC: 22 U/L (ref 7–58)
LYMPHOCYTES # BLD AUTO: 2.75 K/UL (ref 1–4.8)
LYMPHOCYTES NFR BLD: 18.9 % (ref 22–41)
MAGNESIUM SERPL-MCNC: 1 MG/DL (ref 1.5–2.5)
MCH RBC QN AUTO: 26.3 PG (ref 27–33)
MCHC RBC AUTO-ENTMCNC: 32.6 G/DL (ref 33.6–35)
MCV RBC AUTO: 80.6 FL (ref 81.4–97.8)
MONOCYTES # BLD AUTO: 1.09 K/UL (ref 0–0.85)
MONOCYTES NFR BLD AUTO: 7.5 % (ref 0–13.4)
NEUTROPHILS # BLD AUTO: 10.39 K/UL (ref 2–7.15)
NEUTROPHILS NFR BLD: 71.2 % (ref 44–72)
NRBC # BLD AUTO: 0 K/UL
NRBC BLD-RTO: 0 /100 WBC
PLATELET # BLD AUTO: 322 K/UL (ref 164–446)
PMV BLD AUTO: 10.9 FL (ref 9–12.9)
POTASSIUM SERPL-SCNC: 3.1 MMOL/L (ref 3.6–5.5)
PROT SERPL-MCNC: 8.1 G/DL (ref 6–8.2)
PROTHROMBIN TIME: 13.7 SEC (ref 12–14.6)
RBC # BLD AUTO: 4.34 M/UL (ref 4.2–5.4)
SODIUM SERPL-SCNC: 133 MMOL/L (ref 135–145)
TROPONIN I SERPL-MCNC: 0.17 NG/ML (ref 0–0.04)
TROPONIN I SERPL-MCNC: 0.17 NG/ML (ref 0–0.04)
WBC # BLD AUTO: 14.6 K/UL (ref 4.8–10.8)

## 2019-03-03 PROCEDURE — 83690 ASSAY OF LIPASE: CPT

## 2019-03-03 PROCEDURE — 93005 ELECTROCARDIOGRAM TRACING: CPT | Performed by: EMERGENCY MEDICINE

## 2019-03-03 PROCEDURE — A9270 NON-COVERED ITEM OR SERVICE: HCPCS | Performed by: EMERGENCY MEDICINE

## 2019-03-03 PROCEDURE — 700111 HCHG RX REV CODE 636 W/ 250 OVERRIDE (IP): Performed by: EMERGENCY MEDICINE

## 2019-03-03 PROCEDURE — 71045 X-RAY EXAM CHEST 1 VIEW: CPT

## 2019-03-03 PROCEDURE — 700105 HCHG RX REV CODE 258: Performed by: EMERGENCY MEDICINE

## 2019-03-03 PROCEDURE — 85025 COMPLETE CBC W/AUTO DIFF WBC: CPT

## 2019-03-03 PROCEDURE — 80053 COMPREHEN METABOLIC PANEL: CPT

## 2019-03-03 PROCEDURE — A9270 NON-COVERED ITEM OR SERVICE: HCPCS | Performed by: INTERNAL MEDICINE

## 2019-03-03 PROCEDURE — 99285 EMERGENCY DEPT VISIT HI MDM: CPT

## 2019-03-03 PROCEDURE — 96365 THER/PROPH/DIAG IV INF INIT: CPT

## 2019-03-03 PROCEDURE — 83880 ASSAY OF NATRIURETIC PEPTIDE: CPT

## 2019-03-03 PROCEDURE — 93005 ELECTROCARDIOGRAM TRACING: CPT

## 2019-03-03 PROCEDURE — 36415 COLL VENOUS BLD VENIPUNCTURE: CPT

## 2019-03-03 PROCEDURE — 85610 PROTHROMBIN TIME: CPT

## 2019-03-03 PROCEDURE — 700111 HCHG RX REV CODE 636 W/ 250 OVERRIDE (IP): Performed by: INTERNAL MEDICINE

## 2019-03-03 PROCEDURE — 96375 TX/PRO/DX INJ NEW DRUG ADDON: CPT

## 2019-03-03 PROCEDURE — 99223 1ST HOSP IP/OBS HIGH 75: CPT | Performed by: INTERNAL MEDICINE

## 2019-03-03 PROCEDURE — 85730 THROMBOPLASTIN TIME PARTIAL: CPT

## 2019-03-03 PROCEDURE — 83735 ASSAY OF MAGNESIUM: CPT

## 2019-03-03 PROCEDURE — 85379 FIBRIN DEGRADATION QUANT: CPT

## 2019-03-03 PROCEDURE — 700102 HCHG RX REV CODE 250 W/ 637 OVERRIDE(OP): Performed by: EMERGENCY MEDICINE

## 2019-03-03 PROCEDURE — 87040 BLOOD CULTURE FOR BACTERIA: CPT | Mod: 91

## 2019-03-03 PROCEDURE — 93005 ELECTROCARDIOGRAM TRACING: CPT | Performed by: INTERNAL MEDICINE

## 2019-03-03 PROCEDURE — 84484 ASSAY OF TROPONIN QUANT: CPT | Mod: 91

## 2019-03-03 PROCEDURE — 770020 HCHG ROOM/CARE - TELE (206)

## 2019-03-03 PROCEDURE — 700101 HCHG RX REV CODE 250: Performed by: INTERNAL MEDICINE

## 2019-03-03 PROCEDURE — 87502 INFLUENZA DNA AMP PROBE: CPT

## 2019-03-03 PROCEDURE — 700102 HCHG RX REV CODE 250 W/ 637 OVERRIDE(OP): Performed by: INTERNAL MEDICINE

## 2019-03-03 PROCEDURE — 83605 ASSAY OF LACTIC ACID: CPT | Mod: 91

## 2019-03-03 RX ORDER — MORPHINE SULFATE 4 MG/ML
2 INJECTION, SOLUTION INTRAMUSCULAR; INTRAVENOUS
Status: DISCONTINUED | OUTPATIENT
Start: 2019-03-03 | End: 2019-03-05 | Stop reason: HOSPADM

## 2019-03-03 RX ORDER — ACETAMINOPHEN 325 MG/1
650 TABLET ORAL ONCE
Status: COMPLETED | OUTPATIENT
Start: 2019-03-03 | End: 2019-03-03

## 2019-03-03 RX ORDER — FERROUS SULFATE 325(65) MG
325 TABLET ORAL DAILY
Status: DISCONTINUED | OUTPATIENT
Start: 2019-03-04 | End: 2019-03-05 | Stop reason: HOSPADM

## 2019-03-03 RX ORDER — LORAZEPAM 2 MG/ML
0.5 INJECTION INTRAMUSCULAR ONCE
Status: COMPLETED | OUTPATIENT
Start: 2019-03-03 | End: 2019-03-03

## 2019-03-03 RX ORDER — SODIUM CHLORIDE 9 MG/ML
1000 INJECTION, SOLUTION INTRAVENOUS ONCE
Status: COMPLETED | OUTPATIENT
Start: 2019-03-03 | End: 2019-03-03

## 2019-03-03 RX ORDER — CYCLOBENZAPRINE HCL 10 MG
5-10 TABLET ORAL 3 TIMES DAILY PRN
Status: DISCONTINUED | OUTPATIENT
Start: 2019-03-03 | End: 2019-03-05 | Stop reason: HOSPADM

## 2019-03-03 RX ORDER — OXYCODONE HYDROCHLORIDE 5 MG/1
5 TABLET ORAL
Status: DISCONTINUED | OUTPATIENT
Start: 2019-03-03 | End: 2019-03-05 | Stop reason: HOSPADM

## 2019-03-03 RX ORDER — PROMETHAZINE HYDROCHLORIDE 25 MG/1
12.5-25 TABLET ORAL EVERY 4 HOURS PRN
Status: DISCONTINUED | OUTPATIENT
Start: 2019-03-03 | End: 2019-03-05 | Stop reason: HOSPADM

## 2019-03-03 RX ORDER — GUAIFENESIN/DEXTROMETHORPHAN 100-10MG/5
10 SYRUP ORAL EVERY 6 HOURS PRN
Status: DISCONTINUED | OUTPATIENT
Start: 2019-03-03 | End: 2019-03-05 | Stop reason: HOSPADM

## 2019-03-03 RX ORDER — ALBUTEROL SULFATE 90 UG/1
2 AEROSOL, METERED RESPIRATORY (INHALATION) EVERY 4 HOURS PRN
Status: DISCONTINUED | OUTPATIENT
Start: 2019-03-03 | End: 2019-03-05 | Stop reason: HOSPADM

## 2019-03-03 RX ORDER — AZITHROMYCIN 250 MG/1
500 TABLET, FILM COATED ORAL ONCE
Status: COMPLETED | OUTPATIENT
Start: 2019-03-03 | End: 2019-03-03

## 2019-03-03 RX ORDER — POLYETHYLENE GLYCOL 3350 17 G/17G
1 POWDER, FOR SOLUTION ORAL
Status: DISCONTINUED | OUTPATIENT
Start: 2019-03-03 | End: 2019-03-05 | Stop reason: HOSPADM

## 2019-03-03 RX ORDER — ONDANSETRON 4 MG/1
4 TABLET, ORALLY DISINTEGRATING ORAL EVERY 4 HOURS PRN
Status: DISCONTINUED | OUTPATIENT
Start: 2019-03-03 | End: 2019-03-05 | Stop reason: HOSPADM

## 2019-03-03 RX ORDER — ONDANSETRON 2 MG/ML
4 INJECTION INTRAMUSCULAR; INTRAVENOUS EVERY 4 HOURS PRN
Status: DISCONTINUED | OUTPATIENT
Start: 2019-03-03 | End: 2019-03-05 | Stop reason: HOSPADM

## 2019-03-03 RX ORDER — SODIUM CHLORIDE 9 MG/ML
500 INJECTION, SOLUTION INTRAVENOUS
Status: DISCONTINUED | OUTPATIENT
Start: 2019-03-03 | End: 2019-03-05 | Stop reason: HOSPADM

## 2019-03-03 RX ORDER — BISACODYL 10 MG
10 SUPPOSITORY, RECTAL RECTAL
Status: DISCONTINUED | OUTPATIENT
Start: 2019-03-03 | End: 2019-03-05 | Stop reason: HOSPADM

## 2019-03-03 RX ORDER — ONDANSETRON 2 MG/ML
4 INJECTION INTRAMUSCULAR; INTRAVENOUS ONCE
Status: COMPLETED | OUTPATIENT
Start: 2019-03-03 | End: 2019-03-03

## 2019-03-03 RX ORDER — SODIUM CHLORIDE 9 MG/ML
30 INJECTION, SOLUTION INTRAVENOUS
Status: DISCONTINUED | OUTPATIENT
Start: 2019-03-03 | End: 2019-03-05 | Stop reason: HOSPADM

## 2019-03-03 RX ORDER — ENALAPRILAT 1.25 MG/ML
1.25 INJECTION INTRAVENOUS EVERY 6 HOURS PRN
Status: DISCONTINUED | OUTPATIENT
Start: 2019-03-03 | End: 2019-03-05 | Stop reason: HOSPADM

## 2019-03-03 RX ORDER — OXYCODONE HYDROCHLORIDE 5 MG/1
2.5 TABLET ORAL
Status: DISCONTINUED | OUTPATIENT
Start: 2019-03-03 | End: 2019-03-05 | Stop reason: HOSPADM

## 2019-03-03 RX ORDER — PROMETHAZINE HYDROCHLORIDE 25 MG/1
12.5-25 SUPPOSITORY RECTAL EVERY 4 HOURS PRN
Status: DISCONTINUED | OUTPATIENT
Start: 2019-03-03 | End: 2019-03-05 | Stop reason: HOSPADM

## 2019-03-03 RX ORDER — AMOXICILLIN 250 MG
2 CAPSULE ORAL 2 TIMES DAILY
Status: DISCONTINUED | OUTPATIENT
Start: 2019-03-03 | End: 2019-03-05 | Stop reason: HOSPADM

## 2019-03-03 RX ORDER — ASPIRIN 81 MG/1
324 TABLET, CHEWABLE ORAL DAILY
Status: DISCONTINUED | OUTPATIENT
Start: 2019-03-04 | End: 2019-03-05 | Stop reason: HOSPADM

## 2019-03-03 RX ORDER — MORPHINE SULFATE 4 MG/ML
4 INJECTION, SOLUTION INTRAMUSCULAR; INTRAVENOUS ONCE
Status: COMPLETED | OUTPATIENT
Start: 2019-03-03 | End: 2019-03-03

## 2019-03-03 RX ORDER — ASPIRIN 325 MG
325 TABLET ORAL DAILY
Status: DISCONTINUED | OUTPATIENT
Start: 2019-03-04 | End: 2019-03-05 | Stop reason: HOSPADM

## 2019-03-03 RX ORDER — ASPIRIN 600 MG/1
300 SUPPOSITORY RECTAL DAILY
Status: DISCONTINUED | OUTPATIENT
Start: 2019-03-04 | End: 2019-03-05 | Stop reason: HOSPADM

## 2019-03-03 RX ORDER — GUAIFENESIN 600 MG/1
600 TABLET, EXTENDED RELEASE ORAL EVERY 12 HOURS
Status: DISCONTINUED | OUTPATIENT
Start: 2019-03-03 | End: 2019-03-05 | Stop reason: HOSPADM

## 2019-03-03 RX ORDER — SODIUM CHLORIDE AND POTASSIUM CHLORIDE 150; 900 MG/100ML; MG/100ML
INJECTION, SOLUTION INTRAVENOUS CONTINUOUS
Status: DISCONTINUED | OUTPATIENT
Start: 2019-03-03 | End: 2019-03-05 | Stop reason: HOSPADM

## 2019-03-03 RX ORDER — ACETAMINOPHEN 325 MG/1
650 TABLET ORAL EVERY 6 HOURS PRN
Status: DISCONTINUED | OUTPATIENT
Start: 2019-03-03 | End: 2019-03-05 | Stop reason: HOSPADM

## 2019-03-03 RX ORDER — ASPIRIN 81 MG/1
324 TABLET, CHEWABLE ORAL ONCE
Status: COMPLETED | OUTPATIENT
Start: 2019-03-03 | End: 2019-03-03

## 2019-03-03 RX ADMIN — POTASSIUM CHLORIDE AND SODIUM CHLORIDE: 900; 150 INJECTION, SOLUTION INTRAVENOUS at 23:25

## 2019-03-03 RX ADMIN — GUAIFENESIN 600 MG: 600 TABLET, EXTENDED RELEASE ORAL at 23:01

## 2019-03-03 RX ADMIN — AZITHROMYCIN 500 MG: 250 TABLET, FILM COATED ORAL at 18:59

## 2019-03-03 RX ADMIN — ONDANSETRON 4 MG: 2 INJECTION INTRAMUSCULAR; INTRAVENOUS at 21:32

## 2019-03-03 RX ADMIN — ACETAMINOPHEN 650 MG: 325 TABLET, FILM COATED ORAL at 21:31

## 2019-03-03 RX ADMIN — SODIUM CHLORIDE 1000 ML: 9 INJECTION, SOLUTION INTRAVENOUS at 21:32

## 2019-03-03 RX ADMIN — LORAZEPAM 0.5 MG: 2 INJECTION INTRAMUSCULAR; INTRAVENOUS at 18:04

## 2019-03-03 RX ADMIN — SODIUM CHLORIDE 1 G: 900 INJECTION INTRAVENOUS at 18:52

## 2019-03-03 RX ADMIN — MORPHINE SULFATE 4 MG: 4 INJECTION INTRAVENOUS at 21:32

## 2019-03-03 RX ADMIN — NITROGLYCERIN 1 INCH: 20 OINTMENT TOPICAL at 20:24

## 2019-03-03 RX ADMIN — ASPIRIN 81 MG CHEWABLE TABLET 324 MG: 81 TABLET CHEWABLE at 18:51

## 2019-03-03 RX ADMIN — ENOXAPARIN SODIUM 100 MG: 100 INJECTION SUBCUTANEOUS at 23:10

## 2019-03-03 ASSESSMENT — COGNITIVE AND FUNCTIONAL STATUS - GENERAL
EATING MEALS: A LITTLE
DRESSING REGULAR UPPER BODY CLOTHING: A LITTLE
SUGGESTED CMS G CODE MODIFIER DAILY ACTIVITY: CK
HELP NEEDED FOR BATHING: A LITTLE
CLIMB 3 TO 5 STEPS WITH RAILING: A LITTLE
WALKING IN HOSPITAL ROOM: A LITTLE
MOVING TO AND FROM BED TO CHAIR: A LITTLE
DRESSING REGULAR LOWER BODY CLOTHING: A LITTLE
TURNING FROM BACK TO SIDE WHILE IN FLAT BAD: A LITTLE
MOVING FROM LYING ON BACK TO SITTING ON SIDE OF FLAT BED: A LITTLE
PERSONAL GROOMING: A LITTLE
MOBILITY SCORE: 18
DAILY ACTIVITIY SCORE: 18
TOILETING: A LITTLE
SUGGESTED CMS G CODE MODIFIER MOBILITY: CK
STANDING UP FROM CHAIR USING ARMS: A LITTLE

## 2019-03-03 ASSESSMENT — ENCOUNTER SYMPTOMS
FLANK PAIN: 0
FEVER: 0
MEMORY LOSS: 0
HEMOPTYSIS: 0
HEADACHES: 0
COUGH: 1
CHILLS: 0
CLAUDICATION: 0
MYALGIAS: 0
DEPRESSION: 0
NERVOUS/ANXIOUS: 1
SORE THROAT: 0
DIZZINESS: 0
SPEECH CHANGE: 0
WEAKNESS: 1
ABDOMINAL PAIN: 0
DIARRHEA: 0
VOMITING: 0
STRIDOR: 0
WHEEZING: 0
FOCAL WEAKNESS: 0
CONSTIPATION: 0
NAUSEA: 1
DIAPHORESIS: 0
SHORTNESS OF BREATH: 1
BACK PAIN: 0
SENSORY CHANGE: 0
ORTHOPNEA: 0
PALPITATIONS: 0
HEARTBURN: 0
SPUTUM PRODUCTION: 1

## 2019-03-03 ASSESSMENT — PATIENT HEALTH QUESTIONNAIRE - PHQ9
8. MOVING OR SPEAKING SO SLOWLY THAT OTHER PEOPLE COULD HAVE NOTICED. OR THE OPPOSITE, BEING SO FIGETY OR RESTLESS THAT YOU HAVE BEEN MOVING AROUND A LOT MORE THAN USUAL: NOT AT ALL
6. FEELING BAD ABOUT YOURSELF - OR THAT YOU ARE A FAILURE OR HAVE LET YOURSELF OR YOUR FAMILY DOWN: SEVERAL DAYS
4. FEELING TIRED OR HAVING LITTLE ENERGY: SEVERAL DAYS
3. TROUBLE FALLING OR STAYING ASLEEP OR SLEEPING TOO MUCH: SEVERAL DAYS
2. FEELING DOWN, DEPRESSED, IRRITABLE, OR HOPELESS: SEVERAL DAYS
SUM OF ALL RESPONSES TO PHQ QUESTIONS 1-9: 6
7. TROUBLE CONCENTRATING ON THINGS, SUCH AS READING THE NEWSPAPER OR WATCHING TELEVISION: NOT AT ALL
1. LITTLE INTEREST OR PLEASURE IN DOING THINGS: SEVERAL DAYS
9. THOUGHTS THAT YOU WOULD BE BETTER OFF DEAD, OR OF HURTING YOURSELF: NOT AT ALL
5. POOR APPETITE OR OVEREATING: SEVERAL DAYS
SUM OF ALL RESPONSES TO PHQ9 QUESTIONS 1 AND 2: 2

## 2019-03-03 ASSESSMENT — LIFESTYLE VARIABLES
ALCOHOL_USE: NO
EVER_SMOKED: YES
EVER_SMOKED: YES

## 2019-03-03 ASSESSMENT — COPD QUESTIONNAIRES
HAVE YOU SMOKED AT LEAST 100 CIGARETTES IN YOUR ENTIRE LIFE: YES
DURING THE PAST 4 WEEKS HOW MUCH DID YOU FEEL SHORT OF BREATH: SOME OF THE TIME
COPD SCREENING SCORE: 5
DO YOU EVER COUGH UP ANY MUCUS OR PHLEGM?: YES, A FEW DAYS A WEEK OR MONTH
IN THE PAST 12 MONTHS DO YOU DO LESS THAN YOU USED TO BECAUSE OF YOUR BREATHING PROBLEMS: AGREE

## 2019-03-03 ASSESSMENT — PAIN DESCRIPTION - DESCRIPTORS: DESCRIPTORS: STABBING

## 2019-03-04 ENCOUNTER — APPOINTMENT (OUTPATIENT)
Dept: CARDIOLOGY | Facility: MEDICAL CENTER | Age: 49
DRG: 871 | End: 2019-03-04
Attending: INTERNAL MEDICINE
Payer: COMMERCIAL

## 2019-03-04 ENCOUNTER — APPOINTMENT (OUTPATIENT)
Dept: RADIOLOGY | Facility: MEDICAL CENTER | Age: 49
DRG: 871 | End: 2019-03-04
Attending: INTERNAL MEDICINE
Payer: COMMERCIAL

## 2019-03-04 PROBLEM — R79.89 TROPONIN LEVEL ELEVATED: Status: ACTIVE | Noted: 2019-03-03

## 2019-03-04 LAB
ALBUMIN SERPL BCP-MCNC: 3.2 G/DL (ref 3.2–4.9)
ALBUMIN/GLOB SERPL: 1 G/DL
ALP SERPL-CCNC: 65 U/L (ref 30–99)
ALT SERPL-CCNC: 27 U/L (ref 2–50)
ANION GAP SERPL CALC-SCNC: 11 MMOL/L (ref 0–11.9)
AST SERPL-CCNC: 36 U/L (ref 12–45)
BASOPHILS # BLD AUTO: 0.6 % (ref 0–1.8)
BASOPHILS # BLD: 0.08 K/UL (ref 0–0.12)
BILIRUB SERPL-MCNC: 1.3 MG/DL (ref 0.1–1.5)
BUN SERPL-MCNC: 7 MG/DL (ref 8–22)
CALCIUM SERPL-MCNC: 7.5 MG/DL (ref 8.4–10.2)
CHLORIDE SERPL-SCNC: 102 MMOL/L (ref 96–112)
CHOLEST SERPL-MCNC: 142 MG/DL (ref 100–199)
CO2 SERPL-SCNC: 21 MMOL/L (ref 20–33)
CREAT SERPL-MCNC: 0.71 MG/DL (ref 0.5–1.4)
EKG IMPRESSION: NORMAL
EOSINOPHIL # BLD AUTO: 0.24 K/UL (ref 0–0.51)
EOSINOPHIL NFR BLD: 1.8 % (ref 0–6.9)
ERYTHROCYTE [DISTWIDTH] IN BLOOD BY AUTOMATED COUNT: 51.9 FL (ref 35.9–50)
GLOBULIN SER CALC-MCNC: 3.2 G/DL (ref 1.9–3.5)
GLUCOSE SERPL-MCNC: 93 MG/DL (ref 65–99)
HCT VFR BLD AUTO: 30.4 % (ref 37–47)
HDLC SERPL-MCNC: 36 MG/DL
HGB BLD-MCNC: 9.5 G/DL (ref 12–16)
IMM GRANULOCYTES # BLD AUTO: 0.07 K/UL (ref 0–0.11)
IMM GRANULOCYTES NFR BLD AUTO: 0.5 % (ref 0–0.9)
LACTATE BLD-SCNC: 0.7 MMOL/L (ref 0.5–2)
LACTATE BLD-SCNC: 0.8 MMOL/L (ref 0.5–2)
LACTATE BLD-SCNC: 0.9 MMOL/L (ref 0.5–2)
LDLC SERPL CALC-MCNC: 79 MG/DL
LV EJECT FRACT  99904: 60
LV EJECT FRACT MOD 2C 99903: 61.37
LV EJECT FRACT MOD 4C 99902: 57.91
LV EJECT FRACT MOD BP 99901: 56.03
LYMPHOCYTES # BLD AUTO: 3.24 K/UL (ref 1–4.8)
LYMPHOCYTES NFR BLD: 24.1 % (ref 22–41)
MCH RBC QN AUTO: 25.6 PG (ref 27–33)
MCHC RBC AUTO-ENTMCNC: 31.3 G/DL (ref 33.6–35)
MCV RBC AUTO: 81.9 FL (ref 81.4–97.8)
MONOCYTES # BLD AUTO: 0.97 K/UL (ref 0–0.85)
MONOCYTES NFR BLD AUTO: 7.2 % (ref 0–13.4)
NEUTROPHILS # BLD AUTO: 8.85 K/UL (ref 2–7.15)
NEUTROPHILS NFR BLD: 65.8 % (ref 44–72)
NRBC # BLD AUTO: 0 K/UL
NRBC BLD-RTO: 0 /100 WBC
PLATELET # BLD AUTO: 248 K/UL (ref 164–446)
PMV BLD AUTO: 10.8 FL (ref 9–12.9)
POTASSIUM SERPL-SCNC: 2.9 MMOL/L (ref 3.6–5.5)
PROT SERPL-MCNC: 6.4 G/DL (ref 6–8.2)
RBC # BLD AUTO: 3.71 M/UL (ref 4.2–5.4)
SODIUM SERPL-SCNC: 134 MMOL/L (ref 135–145)
TRIGL SERPL-MCNC: 135 MG/DL (ref 0–149)
TROPONIN I SERPL-MCNC: 0.14 NG/ML (ref 0–0.04)
WBC # BLD AUTO: 13.5 K/UL (ref 4.8–10.8)

## 2019-03-04 PROCEDURE — 85025 COMPLETE CBC W/AUTO DIFF WBC: CPT

## 2019-03-04 PROCEDURE — 99232 SBSQ HOSP IP/OBS MODERATE 35: CPT | Performed by: HOSPITALIST

## 2019-03-04 PROCEDURE — 93018 CV STRESS TEST I&R ONLY: CPT | Performed by: INTERNAL MEDICINE

## 2019-03-04 PROCEDURE — 78452 HT MUSCLE IMAGE SPECT MULT: CPT | Mod: 26 | Performed by: INTERNAL MEDICINE

## 2019-03-04 PROCEDURE — 700111 HCHG RX REV CODE 636 W/ 250 OVERRIDE (IP): Performed by: HOSPITALIST

## 2019-03-04 PROCEDURE — 99406 BEHAV CHNG SMOKING 3-10 MIN: CPT

## 2019-03-04 PROCEDURE — 700102 HCHG RX REV CODE 250 W/ 637 OVERRIDE(OP): Performed by: HOSPITALIST

## 2019-03-04 PROCEDURE — 700105 HCHG RX REV CODE 258: Performed by: HOSPITALIST

## 2019-03-04 PROCEDURE — 770020 HCHG ROOM/CARE - TELE (206)

## 2019-03-04 PROCEDURE — 80061 LIPID PANEL: CPT

## 2019-03-04 PROCEDURE — A9270 NON-COVERED ITEM OR SERVICE: HCPCS | Performed by: INTERNAL MEDICINE

## 2019-03-04 PROCEDURE — 93010 ELECTROCARDIOGRAM REPORT: CPT | Performed by: INTERNAL MEDICINE

## 2019-03-04 PROCEDURE — 700111 HCHG RX REV CODE 636 W/ 250 OVERRIDE (IP)

## 2019-03-04 PROCEDURE — 93306 TTE W/DOPPLER COMPLETE: CPT

## 2019-03-04 PROCEDURE — 700111 HCHG RX REV CODE 636 W/ 250 OVERRIDE (IP): Performed by: INTERNAL MEDICINE

## 2019-03-04 PROCEDURE — 84484 ASSAY OF TROPONIN QUANT: CPT

## 2019-03-04 PROCEDURE — 700102 HCHG RX REV CODE 250 W/ 637 OVERRIDE(OP): Performed by: INTERNAL MEDICINE

## 2019-03-04 PROCEDURE — A9502 TC99M TETROFOSMIN: HCPCS

## 2019-03-04 PROCEDURE — 83605 ASSAY OF LACTIC ACID: CPT | Mod: 91

## 2019-03-04 PROCEDURE — 93005 ELECTROCARDIOGRAM TRACING: CPT | Performed by: INTERNAL MEDICINE

## 2019-03-04 PROCEDURE — 80053 COMPREHEN METABOLIC PANEL: CPT

## 2019-03-04 PROCEDURE — A9270 NON-COVERED ITEM OR SERVICE: HCPCS | Performed by: HOSPITALIST

## 2019-03-04 PROCEDURE — 36415 COLL VENOUS BLD VENIPUNCTURE: CPT

## 2019-03-04 PROCEDURE — 93306 TTE W/DOPPLER COMPLETE: CPT | Mod: 26 | Performed by: INTERNAL MEDICINE

## 2019-03-04 RX ORDER — POTASSIUM CHLORIDE 20 MEQ/1
40 TABLET, EXTENDED RELEASE ORAL DAILY
Status: COMPLETED | OUTPATIENT
Start: 2019-03-04 | End: 2019-03-05

## 2019-03-04 RX ORDER — REGADENOSON 0.08 MG/ML
INJECTION, SOLUTION INTRAVENOUS
Status: COMPLETED
Start: 2019-03-04 | End: 2019-03-04

## 2019-03-04 RX ORDER — IBUPROFEN 200 MG
800 TABLET ORAL EVERY 6 HOURS PRN
COMMUNITY

## 2019-03-04 RX ORDER — AZITHROMYCIN 250 MG/1
500 TABLET, FILM COATED ORAL EVERY EVENING
Status: DISCONTINUED | OUTPATIENT
Start: 2019-03-04 | End: 2019-03-05 | Stop reason: HOSPADM

## 2019-03-04 RX ADMIN — FERROUS SULFATE TAB 325 MG (65 MG ELEMENTAL FE) 325 MG: 325 (65 FE) TAB at 05:51

## 2019-03-04 RX ADMIN — AZITHROMYCIN 500 MG: 250 TABLET, FILM COATED ORAL at 17:05

## 2019-03-04 RX ADMIN — POTASSIUM CHLORIDE 40 MEQ: 1500 TABLET, EXTENDED RELEASE ORAL at 11:42

## 2019-03-04 RX ADMIN — ENOXAPARIN SODIUM 100 MG: 100 INJECTION SUBCUTANEOUS at 11:42

## 2019-03-04 RX ADMIN — DOCUSATE SODIUM AND SENNOSIDES 2 TABLET: 8.6; 5 TABLET, FILM COATED ORAL at 17:05

## 2019-03-04 RX ADMIN — ASPIRIN 325 MG ORAL TABLET 325 MG: 325 PILL ORAL at 05:51

## 2019-03-04 RX ADMIN — GUAIFENESIN 600 MG: 600 TABLET, EXTENDED RELEASE ORAL at 17:05

## 2019-03-04 RX ADMIN — Medication 400 MG: at 05:51

## 2019-03-04 RX ADMIN — REGADENOSON 0.4 MG: 0.08 INJECTION, SOLUTION INTRAVENOUS at 10:05

## 2019-03-04 RX ADMIN — GUAIFENESIN 600 MG: 600 TABLET, EXTENDED RELEASE ORAL at 05:51

## 2019-03-04 RX ADMIN — CEFTRIAXONE SODIUM 2 G: 2 INJECTION, POWDER, FOR SOLUTION INTRAMUSCULAR; INTRAVENOUS at 17:06

## 2019-03-04 ASSESSMENT — ENCOUNTER SYMPTOMS
VOMITING: 0
FALLS: 0
FLANK PAIN: 0
EYE PAIN: 0
SPEECH CHANGE: 0
BRUISES/BLEEDS EASILY: 0
EYE DISCHARGE: 0
POLYDIPSIA: 0
PALPITATIONS: 0
NERVOUS/ANXIOUS: 0
SORE THROAT: 0
BLOOD IN STOOL: 0
ABDOMINAL PAIN: 0
FOCAL WEAKNESS: 0
CHILLS: 0
HALLUCINATIONS: 0
COUGH: 1
STRIDOR: 0
LOSS OF CONSCIOUSNESS: 0
EYE REDNESS: 0
MYALGIAS: 0
SPUTUM PRODUCTION: 1
DIARRHEA: 0
HEMOPTYSIS: 0
SEIZURES: 0
FEVER: 0
PHOTOPHOBIA: 0
SHORTNESS OF BREATH: 1

## 2019-03-04 NOTE — RESPIRATORY CARE
COPD EDUCATION by COPD CLINICAL EDUCATOR  3/4/2019 at 9:17 AM by Amanda Gonzalez     Patient reviewed by COPD education team. Patient does not qualify for COPD program.

## 2019-03-04 NOTE — DIETARY
"Nutrition services: Day 1 of admit. Joann Deshpande is a 48 y.o. female with admitting DX of Elevated troponin, HAP (hospital-acquired pneumonia)    Consult received for poor PO PTA.    Assessment:  Height: 170.2 cm (5' 7\")  Weight: 107 kg (235 lb 14.3 oz)  Body mass index is 36.95 kg/m² - obese class II  Diet/Intake: Regular, no PO records yet    Evaluation:   1. Pt reports poor PO intake since Friday (3 days ago). She was able to tolerate some food during this time.  2. She denies unintended wt loss. -220# recently. She reports that she weighed 170-180# two years ago. Current wt is 235#.  3. She reports an improving appetite. No PO records to assess yet. Discussed snacks however pt declined to add any for now.  4. Room service and menu explained so that pt can order food PRN.    Malnutrition Risk: No criteria noted at this time.    Recommendations/Plan:  1. Continue current diet.   2. Encourage intake of meals  3. Document intake of all meals as % taken in ADL's to provide interdisciplinary communication across all shifts.   4. Monitor weight.  5. Nutrition rep will continue to see patient for ongoing meal and snack preferences.     RD monitoring.        "

## 2019-03-04 NOTE — PROGRESS NOTES
[facility] Medicine Daily Progress Note    Date of Service  3/4/2019    Chief Complaint  Chest pain and shortness of breath    Hospital Course      48 y.o. female with past medical history of hypertension and obesity admitted 3/3/2019 with sepsis secondary to pneumonia and found to have a troponin elevation.         Interval Problem Update  Heart rate 80s-110, saturating well on 0-2 L of oxygen  Encourage incentive spirometry, try to wean off as able  Continues to have cough, sputum production  Chest pain is worse with coughing.  Underwent a stress test today that was negative for ischemia.  Continue ceftriaxone and azithromycin for pneumonia  Blood cultures negative to date, continue to follow.  Hypokalemia I am replacing, I am checking magnesium..    Consultants/Specialty  N/A    Code Status  FULL     Disposition  TBD     Review of Systems  Review of Systems   Constitutional: Positive for malaise/fatigue. Negative for chills and fever.   HENT: Negative for congestion, ear discharge, ear pain and sore throat.    Eyes: Negative for photophobia, pain, discharge and redness.   Respiratory: Positive for cough, sputum production and shortness of breath. Negative for hemoptysis and stridor.    Cardiovascular: Positive for chest pain (with coughing ). Negative for palpitations and leg swelling.   Gastrointestinal: Negative for abdominal pain, blood in stool, diarrhea and vomiting.   Genitourinary: Negative for flank pain, hematuria and urgency.   Musculoskeletal: Negative for falls and myalgias.   Skin: Negative.    Neurological: Negative for speech change, focal weakness, seizures and loss of consciousness.   Endo/Heme/Allergies: Negative for polydipsia. Does not bruise/bleed easily.   Psychiatric/Behavioral: Negative for hallucinations and suicidal ideas. The patient is not nervous/anxious.         Physical Exam  Temp:  [37 °C (98.6 °F)-37.5 °C (99.5 °F)] 37.3 °C (99.1 °F)  Pulse:  [] 103  Resp:  [18-24] 18  BP:  (113-141)/(70-89) 128/70  SpO2:  [90 %-98 %] 95 %    Physical Exam   Constitutional: She is oriented to person, place, and time. She appears well-developed and well-nourished. No distress.   HENT:   Head: Normocephalic and atraumatic.   Right Ear: External ear normal.   Left Ear: External ear normal.   Eyes: Pupils are equal, round, and reactive to light. Conjunctivae are normal. Right eye exhibits no discharge. Left eye exhibits no discharge.   Neck: Normal range of motion. Neck supple. No JVD present. No tracheal deviation present. No thyromegaly present.   Cardiovascular: Exam reveals no gallop and no friction rub.    No murmur heard.  Pulmonary/Chest: Effort normal. No stridor. No respiratory distress. She has no wheezes. She has rales. She exhibits tenderness.   Reduced air entry B/L    Abdominal: Soft. Bowel sounds are normal. She exhibits no distension. There is no tenderness. There is no rebound and no guarding.   Musculoskeletal: Normal range of motion. She exhibits no edema, tenderness or deformity.   Neurological: She is alert and oriented to person, place, and time. No cranial nerve deficit. Coordination normal.   Skin: Skin is warm and dry. No rash noted. She is not diaphoretic. No erythema. No pallor.   Psychiatric: She has a normal mood and affect. Judgment normal.   Nursing note and vitals reviewed.      Fluids    Intake/Output Summary (Last 24 hours) at 03/04/19 2100  Last data filed at 03/04/19 1800   Gross per 24 hour   Intake             2739 ml   Output                0 ml   Net             2739 ml       Laboratory  Recent Labs      03/03/19   1618  03/04/19   0324   WBC  14.6*  13.5*   RBC  4.34  3.71*   HEMOGLOBIN  11.4*  9.5*   HEMATOCRIT  35.0*  30.4*   MCV  80.6*  81.9   MCH  26.3*  25.6*   MCHC  32.6*  31.3*   RDW  50.3*  51.9*   PLATELETCT  322  248   MPV  10.9  10.8     Recent Labs      03/03/19   1618  03/04/19   0324   SODIUM  133*  134*   POTASSIUM  3.1*  2.9*   CHLORIDE  98  102    CO2  24  21   GLUCOSE  118*  93   BUN  8  7*   CREATININE  0.83  0.71   CALCIUM  8.5  7.5*     Recent Labs      03/03/19   1618   APTT  30.7   INR  1.06     Recent Labs      03/03/19   1618   BNPBTYPENAT  57     Recent Labs      03/04/19   0324   TRIGLYCERIDE  135   HDL  36*   LDL  79       Imaging  EC-ECHOCARDIOGRAM COMPLETE W/O CONT   Final Result      NM-CARDIAC STRESS TEST   Final Result      DX-CHEST-LIMITED (1 VIEW)   Final Result         Airspace opacities in the left upper lobe and left lower lobe, in keeping with multifocal pneumonia.           Assessment/Plan  * Community acquired pneumonia of left lung- (present on admission)   Assessment & Plan    CXR with multifocal pna and previous hospitalization in January 2018 for community-acquired pneumonia  Continue Rocephin and azithromycin  We will follow-up respiratory and blood cultures   Lactic acid within normal limits   Oxygen supplementation as needed  Questionable history of sarcoidosis, as per patient has followed up with pulmonary, and this has been ruled out     Troponin level elevated   Assessment & Plan    Troponin of 0.17, improving, this is mostly related to sepsis  Continue cardiac monitoring   Follow-up troponin and echo  Stress test negative   We will continue aspirin  May be secondary to acute infection  EKG with sinus tachycardia     Hypokalemia- (present on admission)   Assessment & Plan    Replete  Follow-up magnesium level   Monitor closely     Essential hypertension- (present on admission)   Assessment & Plan    Improving  Enalapril as needed   He may need a scheduled agent on discharge     Sepsis(995.91)- (present on admission)   Assessment & Plan    This is severe sepsis with the following associated acute organ dysfunction(s): acute respiratory failure.   With fever, tachycardia and community-acquired pneumonia   She is on septic protocol.  LA within normal limits     Obesity- (present on admission)   Assessment & Plan    Body mass  index is 36.95 kg/m².  Counseling provided.    I explained that there is increase in mortality and morbidity associated with excess weight.  There is increased risk of diabetes, hypertension, heart disease, cerebrovascular accidents, sleep apnea and cancer.    I encouraged the patient to lose weight by reducing caloric intake, I encouraged low-carb diet in addition to aim for 120-150 minutes of exercise a week.           VTE prophylaxis: Lovenox

## 2019-03-04 NOTE — ASSESSMENT & PLAN NOTE
Body mass index is 36.95 kg/m².  Counseling provided.    I explained that there is increase in mortality and morbidity associated with excess weight.  There is increased risk of diabetes, hypertension, heart disease, cerebrovascular accidents, sleep apnea and cancer.    I encouraged the patient to lose weight by reducing caloric intake, I encouraged low-carb diet in addition to aim for 120-150 minutes of exercise a week.

## 2019-03-04 NOTE — ASSESSMENT & PLAN NOTE
This is severe sepsis with the following associated acute organ dysfunction(s): acute respiratory failure.   With fever, tachycardia and community-acquired pneumonia   She is on septic protocol.  LA within normal limits

## 2019-03-04 NOTE — PROGRESS NOTES
Pt is AAO x4.  Reports slight chest pain only with cough.  TELE in place.  R PIV patent, running fluids.  VS WNL.  2 L O2 running via NC.  POC discussed.  All needs met at this time.  Bed in low position.  Call light within reach.  Rounding in place.

## 2019-03-04 NOTE — PROGRESS NOTES
Pt arrrived to [facility]. Awake, alert, appropriate and pleasant. Ambulated to bed from [facility] with steady gait. Nitro paste remains in place. IV bolus from ER continuing to run. Delayed administration d/t pt bending arm frequently and IV access in R AC. Pt connected to tele monitor. VSS. Remains on 2L/min oxygen. Chest pain at 4/10 right now, denies need for intervention. Relative at bedside. Pt denies needs at this time. Will continue to monitor.

## 2019-03-04 NOTE — CARE PLAN
Problem: Knowledge Deficit  Goal: Knowledge of disease process/condition, treatment plan, diagnostic tests, and medications will improve   POC discussed. Pt understands the plan is to get echo and stress test completed, will keep pt updated on times. All questions and concerns addressed.    Problem: Pain Management  Goal: Pain level will decrease to patient's comfort goal  Pt understands to inform this RN if there is a new onset of chest pain that is continuous. Will closely monitor.

## 2019-03-04 NOTE — ASSESSMENT & PLAN NOTE
CXR with multifocal pna and recent hospitalization in January for community-acquired pneumonia  Will admit to the telemetry inpatient unit  We will start vancomycin and Zosyn  We will follow-up respiratory and blood cultures  Lactic acid within normal limits  Oxygen supplementation as needed  Questionable history of sarcoidosis, as per patient has followed up with pulmonary, and this has been ruled out  We will follow-up d-dimer  Consider obtaining CT chest if symptoms continue

## 2019-03-04 NOTE — PROGRESS NOTES
Med rec updated and complete  Allergies reviewed  Pt reports that she ran out of her FERROUS SULFATE 325MG about a month ago.  Pt reports no antibiotics in the last 30 days.

## 2019-03-04 NOTE — ED NOTES
Report received from Susu TREVIÑO.  Assumed patient care. Pt assesement done.  Plan of care reviewed with patient.

## 2019-03-04 NOTE — ASSESSMENT & PLAN NOTE
CXR with multifocal pna and previous hospitalization in January 2018 for community-acquired pneumonia  Continue Rocephin and azithromycin  We will follow-up respiratory and blood cultures   Lactic acid within normal limits   Oxygen supplementation as needed  Questionable history of sarcoidosis, as per patient has followed up with pulmonary, and this has been ruled out

## 2019-03-04 NOTE — CARE PLAN
Problem: Communication  Goal: The ability to communicate needs accurately and effectively will improve  Outcome: PROGRESSING AS EXPECTED  Communicates needs effectively and calls appropriately    Problem: Safety  Goal: Will remain free from injury  Outcome: PROGRESSING AS EXPECTED    Goal: Will remain free from falls  Outcome: PROGRESSING AS EXPECTED      Problem: Pain Management  Goal: Pain level will decrease to patient's comfort goal  Outcome: PROGRESSING AS EXPECTED  Pain managed at this time    Problem: Respiratory:  Goal: Respiratory status will improve  Outcome: PROGRESSING AS EXPECTED  Pt continues to need 2L/min oxygen. Continued strong cough, utilizes IS.

## 2019-03-04 NOTE — PROGRESS NOTES
Patient presents to NM suite for cardiac stress test with MPI. Nursing goals identified: knowledge deficit, potential for anxiety r/t stress test, potential for compromised cardiac output. Care plan includes educating patient, reassurance and access to ACLS cart/team. Labs and ECG reviewed. No caffeine and NPO confirmed. Resting images attained and patient prepped for pharmacological stress study. Lexiscan given while patient ambulated on TM x 2 mins. Patient reported these symptoms: SOB, lightheadedness, increased chest pain, cough, nausea w/ emesis. Caffeinated beverage provided. Symptoms resolved.

## 2019-03-04 NOTE — ED TRIAGE NOTES
"Pt presents with a C/O acute onset of central/suprasternal chest pain referred to her left side with associated dyspnea at rest, worsening progressively for the past 3 days.  Chief Complaint   Patient presents with   • Chest Pain   • Shortness of Breath     BP (!) 163/92   Pulse (!) 120   Temp 37.4 °C (99.3 °F) (Temporal)   Resp 20   Ht 1.702 m (5' 7\")   Wt 107 kg (235 lb 14.3 oz)   LMP 02/25/2019 (Exact Date)   SpO2 100%   BMI 36.95 kg/m²     "

## 2019-03-04 NOTE — ED NOTES
ERP at bedside. Pt agrees with plan of care discussed by ERP. AIDET acknowledged with patient. Glenna in low position, side rail up for pt safety. Call light within reach. IV established. Blood sent to lab.Will continue to monitor.

## 2019-03-04 NOTE — H&P
Hospital Medicine History and Physical    Date of Service  3/3/2019    Chief Complaint  Chief Complaint   Patient presents with   • Chest Pain   • Shortness of Breath       History of Presenting Illness  48 y.o. female with a past medical history of recently treated for community-acquired pneumonia in January now presents on 3/3/2019 with increasing shortness of breath with cough and greenish sputum production for 3 days.  She reports associated substernal chest discomfort with radiation to the left back.  She describes the pain as pressure-like.  She is noted to be tachycardic in the 1 teens with elevated troponin in the emergency room.  EKG with no nonspecific ST-T changes.  She is noted to be hypertensive with systolics in the 170s-180s.    Patient also reports associated fever and chills for the last 3 days with greenish sputum production with cough.  She was recently treated at the end of January 2019 for community-acquired pneumonia.  There was concern of sarcoidosis in the past, as per patient she has been followed up with the pulmonologist and has been told that she does not have a history of sarcoidosis.    On my examination, patient reports chest pain, pain level of 8/10.  She has been started on nitroglycerin and morphine as needed.  Influenza has been checked which is negative.  She reports mild shortness of breath.  She does report associated nausea no vomiting.                  Primary Care Physician  Reilly Calles M.D.    Consultants  None    Code Status  Code: Full code    Review of Systems  Review of Systems   Constitutional: Negative for chills, diaphoresis, fever and malaise/fatigue.   HENT: Negative for congestion, hearing loss and sore throat.    Respiratory: Positive for cough, sputum production and shortness of breath. Negative for hemoptysis, wheezing and stridor.    Cardiovascular: Positive for chest pain. Negative for palpitations, orthopnea, claudication and leg swelling.    Gastrointestinal: Positive for nausea. Negative for abdominal pain, constipation, diarrhea, heartburn and vomiting.   Genitourinary: Negative for dysuria and flank pain.   Musculoskeletal: Negative for back pain, joint pain and myalgias.   Neurological: Positive for weakness. Negative for dizziness, sensory change, speech change, focal weakness and headaches.   Psychiatric/Behavioral: Negative for depression and memory loss. The patient is nervous/anxious.           Past Medical History  Past Medical History:   Diagnosis Date   • Sarcoidosis 2005   • Anemia    Community-acquired pneumonia  Obesity  Anxiety     Surgical History  Past Surgical History:   Procedure Laterality Date   • PRIMARY C SECTION         Medications  No current facility-administered medications on file prior to encounter.      Current Outpatient Prescriptions on File Prior to Encounter   Medication Sig Dispense Refill   • MAGNESIUM-OXIDE 400 (241.3 Mg) MG Tab tablet TAKE 1 TAB BY MOUTH EVERY DAY. 90 Tab 0   • cyclobenzaprine (FLEXERIL) 5 MG tablet Take 1-2 Tabs by mouth 3 times a day as needed. 30 Tab 0   • ferrous sulfate 325 (65 Fe) MG tablet Take 1 Tab by mouth every day. 30 Tab    • guaifenesin LA (MUCINEX) 600 MG TABLET SR 12 HR Take 1 Tab by mouth every 12 hours. 28 Tab    • albuterol 108 (90 Base) MCG/ACT Aero Soln inhalation aerosol Inhale 2 Puffs by mouth every four hours as needed for Shortness of Breath.     • multivitamin (THERAGRAN) Tab Take 1 Tab by mouth every day.         Family History  Family History   Problem Relation Age of Onset   • Hypertension Mother    • Psychiatry Mother    • Stroke Father    • Diabetes Father    • Cancer Maternal Grandmother    • Diabetes Paternal Grandmother    • Lung Disease Paternal Grandfather        Social History  Social History   Substance Use Topics   • Smoking status: Former Smoker     Packs/day: 1.00     Years: 23.00     Quit date: 1/25/2018   • Smokeless tobacco: Never Used   • Alcohol use  1.2 - 1.8 oz/week     2 - 3 Shots of liquor per week      Comment: Occasionally       Allergies  No Known Allergies     Physical Exam  Laboratory   Hemodynamics  Temp (24hrs), Av.6 °C (99.6 °F), Min:37.3 °C (99.1 °F), Max:38.1 °C (100.5 °F)   Temperature: 37.3 °C (99.1 °F)  Pulse  Av.8  Min: 107  Max: 123 Heart Rate (Monitored): (!) 108  Blood Pressure: 143/84, NIBP: 157/84      Respiratory      Respiration: (!) 30, Pulse Oximetry: 95 %             Physical Exam   Constitutional: She is oriented to person, place, and time. She appears well-nourished. She appears distressed.   HENT:   Head: Normocephalic and atraumatic.   Nose: Nose normal.   Mouth/Throat: No oropharyngeal exudate.   Eyes: Pupils are equal, round, and reactive to light. EOM are normal. Right eye exhibits no discharge. Left eye exhibits no discharge.   Neck: Neck supple. No JVD present. No thyromegaly present.   Cardiovascular: Normal rate and intact distal pulses.    No murmur heard.  Pulmonary/Chest: Effort normal. No respiratory distress. She has no wheezes. She has rales. She exhibits tenderness.   Abdominal: Soft. Bowel sounds are normal. She exhibits no distension. There is no tenderness.   Musculoskeletal: She exhibits tenderness. She exhibits no edema.   Neurological: She is alert and oriented to person, place, and time. No cranial nerve deficit. Coordination normal.   Skin: Skin is warm and dry. No rash noted. She is not diaphoretic. No erythema. No pallor.   Psychiatric: She has a normal mood and affect. Her behavior is normal. Thought content normal.   Anxious   Nursing note and vitals reviewed.        Assessment/Plan  * Community acquired pneumonia of left lung- (present on admission)   Assessment & Plan    CXR with multifocal pna and previous hospitalization in 2018 for community-acquired pneumonia  Will admit to the telemetry inpatient unit  Continue Rocephin and azithromycin  We will follow-up respiratory and blood  cultures  Lactic acid within normal limits  Oxygen supplementation as needed  Questionable history of sarcoidosis, as per patient has followed up with pulmonary, and this has been ruled out  We will follow-up d-dimer  Consider obtaining CT chest if symptoms continue     NSTEMI (non-ST elevated myocardial infarction) (HCC)   Assessment & Plan    Troponin of 0.17  Continue cardiac monitoring  Follow-up troponin and echocardiogram  N.p.o. for possible cardiac evaluation  We will continue aspirin  Start full dose Lovenox  Consider discontinuing if troponin improves  May be secondary to acute infection  Consult cardiology as needed  EKG with sinus tachycardia     Hypokalemia- (present on admission)   Assessment & Plan    Replete  Follow-up magnesium level  Monitor closely     Essential hypertension- (present on admission)   Assessment & Plan    With hypertensive urgency  And elevated troponin  Now improving  Enalapril as needed       Sepsis(995.91)- (present on admission)   Assessment & Plan    This is severe sepsis with the following associated acute organ dysfunction(s): acute respiratory failure.   With fever, tachycardia and community-acquired pneumonia with respiratory failure and non-ST elevation MI.  She is on septic protocol.    LA within normal limits     Obesity- (present on admission)   Assessment & Plan    .         I anticipate this patient will require at least two midnights for appropriate medical management, necessitating inpatient admission.    Prophylaxis: lovenox    Recent Labs      03/03/19   1618   WBC  14.6*   RBC  4.34   HEMOGLOBIN  11.4*   HEMATOCRIT  35.0*   MCV  80.6*   MCH  26.3*   MCHC  32.6*   RDW  50.3*   PLATELETCT  322   MPV  10.9     Recent Labs      03/03/19   1618   SODIUM  133*   POTASSIUM  3.1*   CHLORIDE  98   CO2  24   GLUCOSE  118*   BUN  8   CREATININE  0.83   CALCIUM  8.5     Recent Labs      03/03/19   1618   ALTSGPT  39   ASTSGOT  64*   ALKPHOSPHAT  86   TBILIRUBIN  1.7*    LIPASE  22   GLUCOSE  118*     Recent Labs      03/03/19   1618   APTT  30.7   INR  1.06     Recent Labs      03/03/19   1618   BNPBTYPENAT  57         Lab Results   Component Value Date    TROPONINI 0.17 (H) 03/03/2019       Imaging  DX-CHEST-LIMITED (1 VIEW)   Final Result         Airspace opacities in the left upper lobe and left lower lobe, in keeping with multifocal pneumonia.      NM-CARDIAC STRESS TEST    (Results Pending)   EC-ECHOCARDIOGRAM COMPLETE W/ CONT    (Results Pending)

## 2019-03-04 NOTE — PROGRESS NOTES
Pt back on unit.  Denies pain or discomfort at this time.  AAOx4.  VS WNL.  Weaned off O2 at this time, will closely monitor.

## 2019-03-04 NOTE — ASSESSMENT & PLAN NOTE
Troponin of 0.17, improving, this is mostly related to sepsis  Continue cardiac monitoring   Follow-up troponin and echo  Stress test negative   We will continue aspirin  May be secondary to acute infection  EKG with sinus tachycardia

## 2019-03-04 NOTE — ED PROVIDER NOTES
"ED Provider Note    CHIEF COMPLAINT  Chief Complaint   Patient presents with   • Chest Pain   • Shortness of Breath       [facility]  Joann Binta Deshpande is a 48 y.o. female with a history of pneumonia who presents complaining of chest pain.  Patient states she has had substernal/left-sided chest pressure since Friday evening.  This has been constant, radiates nto her left upper back, increases with lying down, increases with deep inspiration.  She reports a fever with a T-max of 102 accompanied by thick green phlegm.  Patient states she has had posttussive gagging and goes into \"muscle spasms\" all over when she has a coughing fit patient also feels short of breath.  She denies nausea, diaphoresis, hemoptysis, calf pain, leg swelling, recent car/plane travel/immobilization/surgery.    Patient states she has had pneumonia several times in the past with similar symptoms.    Patient states she does not have sarcoidosis per a pulmonologist she saw some years ago following a suspected diagnosis of this.      ALLERGIES  No Known Allergies    CURRENT MEDICATIONS  theraflu    PAST MEDICAL HISTORY   has a past medical history of Anemia and Sarcoidosis (2005).    SURGICAL HISTORY   has a past surgical history that includes primary c section.    SOCIAL HISTORY  Social History     Social History Main Topics   • Smoking status: Former Smoker     Packs/day: 1.00     Years: 23.00     Quit date: 1/25/2018   • Smokeless tobacco: Never Used   • Alcohol use 1.2 - 1.8 oz/week     2 - 3 Shots of liquor per week      Comment: Occasionally   • Drug use: No   • Sexual activity: Yes     Partners: Male     Birth control/ protection: Surgical       Family Hx:  Father with diabetes and end-stage renal disease  Mother with hypertension and diabetes      REVIEW OF SYSTEMS  See HPI for further details.  All other systems are negative except as above in HPI.      PHYSICAL EXAM  VITAL SIGNS: BP (!) 172/97   Pulse (!) 107   Temp (!) 38.1 °C " "(100.5 °F) (Oral)   Resp (!) 30   Ht 1.702 m (5' 7\")   Wt 107 kg (235 lb 14.3 oz)   LMP 02/25/2019 (Exact Date)   SpO2 94%   BMI 36.95 kg/m²     General:  WD obese, nontoxic appearing, anxious appearing; A+Ox3; V/S as above; febrile, tachycardic, hypertensive, not hypoxic  Skin: warm and dry; good color; no rash  HEENT: NCAT; EOMs intact; PERRL; no scleral icterus   Neck: FROM; no meningismus, no LAD, no JVD  Cardiovascular: Tachycardic heart rate and regular rhythm.  No murmurs, rubs, or gallops; pulses 2+ bilaterally radially  Lungs: Clear to auscultation with good air movement bilaterally.  No wheezes, rhonchi, or rales.   Abdomen: BS present; soft; NTND; no rebound, guarding, or rigidity.  No organomegaly or pulsatile mass; no CVAT   Extremities: SHERIFF x 4; no e/o trauma; no pedal edema; neg Kenna's  Neurologic: CNs III-XII grossly intact; speech clear; distal sensation intact; strength 5/5 UE/LEs  Psychiatric: Appropriate affect, anxious mood    LABS  Results for orders placed or performed during the hospital encounter of 03/03/19   Troponin   Result Value Ref Range    Troponin I 0.17 (H) 0.00 - 0.04 ng/mL   Btype Natriuretic Peptide   Result Value Ref Range    B Natriuretic Peptide 57 0 - 100 pg/mL   CBC with Differential   Result Value Ref Range    WBC 14.6 (H) 4.8 - 10.8 K/uL    RBC 4.34 4.20 - 5.40 M/uL    Hemoglobin 11.4 (L) 12.0 - 16.0 g/dL    Hematocrit 35.0 (L) 37.0 - 47.0 %    MCV 80.6 (L) 81.4 - 97.8 fL    MCH 26.3 (L) 27.0 - 33.0 pg    MCHC 32.6 (L) 33.6 - 35.0 g/dL    RDW 50.3 (H) 35.9 - 50.0 fL    Platelet Count 322 164 - 446 K/uL    MPV 10.9 9.0 - 12.9 fL    Neutrophils-Polys 71.20 44.00 - 72.00 %    Lymphocytes 18.90 (L) 22.00 - 41.00 %    Monocytes 7.50 0.00 - 13.40 %    Eosinophils 1.20 0.00 - 6.90 %    Basophils 0.80 0.00 - 1.80 %    Immature Granulocytes 0.40 0.00 - 0.90 %    Nucleated RBC 0.00 /100 WBC    Neutrophils (Absolute) 10.39 (H) 2.00 - 7.15 K/uL    Lymphs (Absolute) 2.75 1.00 - " 4.80 K/uL    Monos (Absolute) 1.09 (H) 0.00 - 0.85 K/uL    Eos (Absolute) 0.18 0.00 - 0.51 K/uL    Baso (Absolute) 0.11 0.00 - 0.12 K/uL    Immature Granulocytes (abs) 0.06 0.00 - 0.11 K/uL    NRBC (Absolute) 0.00 K/uL   Complete Metabolic Panel (CMP)   Result Value Ref Range    Sodium 133 (L) 135 - 145 mmol/L    Potassium 3.1 (L) 3.6 - 5.5 mmol/L    Chloride 98 96 - 112 mmol/L    Co2 24 20 - 33 mmol/L    Anion Gap 11.0 0.0 - 11.9    Glucose 118 (H) 65 - 99 mg/dL    Bun 8 8 - 22 mg/dL    Creatinine 0.83 0.50 - 1.40 mg/dL    Calcium 8.5 8.4 - 10.2 mg/dL    AST(SGOT) 64 (H) 12 - 45 U/L    ALT(SGPT) 39 2 - 50 U/L    Alkaline Phosphatase 86 30 - 99 U/L    Total Bilirubin 1.7 (H) 0.1 - 1.5 mg/dL    Albumin 4.0 3.2 - 4.9 g/dL    Total Protein 8.1 6.0 - 8.2 g/dL    Globulin 4.1 (H) 1.9 - 3.5 g/dL    A-G Ratio 1.0 g/dL   Prothrombin Time   Result Value Ref Range    PT 13.7 12.0 - 14.6 sec    INR 1.06 0.87 - 1.13   APTT   Result Value Ref Range    APTT 30.7 24.7 - 36.0 sec   Lipase   Result Value Ref Range    Lipase 22 7 - 58 U/L   ESTIMATED GFR   Result Value Ref Range    GFR If African American >60 >60 mL/min/1.73 m 2    GFR If Non African American >60 >60 mL/min/1.73 m 2   LACTIC ACID   Result Value Ref Range    Lactic Acid 1.5 0.5 - 2.0 mmol/L   EKG   Result Value Ref Range    Report       [facility] Emergency Dept.    Test Date:  2019  Pt Name:    MATILDA FERRARI               Department: [facility]  MRN:        [id number]                      Room:  Gender:     Female                       Technician: HRS  :        [id number]                   Requested By:ER TRIAGE PROTOCOL  Order #:    [id number]                    Reading MD: XIOMARA TY MD    Measurements  Intervals                                Axis  Rate:       111                          P:          59  TN:         168                          QRS:        30  QRSD:       94                           T:          17  QT:          328  QTc:        446    Interpretive Statements  SINUS TACHYCARDIA  PROBABLE LEFT ATRIAL ABNORMALITY  Compared to ECG 2018 04:45:10  No significant changes    Electronically Signed On 3-3-2019 18:42:56 PST by XIOMARA TY MD     EKG   Result Value Ref Range    Report       [facility] Emergency Dept.    Test Date:  2019  Pt Name:    MATILDA DESHPANDE               Department: [facility]  MRN:        [id number]                      [facility]  Gender[facility]     [facility]                       Technician: HRS  :        1970                   Requested By:XIOMARA TY  Order #:    [id number]                    Reading MD: XIOMARA TY MD    Measurements  Intervals                                Axis  Rate:       111                          P:          47  WA:         157                          QRS:        29  QRSD:       95                           T:          4  QT:         338  QTc:        460    Interpretive Statements  Sinus tachycardia  Probable left atrial enlargement  Abnormal R-wave progression, late transition  Compared to ECG 2019 16:11:30  No significant changes    Electronically Signed On 3-3-2019 18:42:59 PST by XIOMARA TY MD           IMAGING  DX-CHEST-LIMITED (1 VIEW)   Final Result         Airspace opacities in the left upper lobe and left lower lobe, in keeping with multifocal pneumonia.          MEDICAL RECORD  I have reviewed patient's medical record and pertinent results are listed below.      COURSE & MEDICAL DECISION MAKING  I have reviewed any medical record information, laboratory studies and radiographic results as noted.    Matilda Deshpande is a 48 y.o. female who presents complaining of chest pain, cough, fever.    Differential diagnosis includes ACS, pneumonia, thoracic aortic dissection, COPD exacerbation, influenza.    EKG demonstrated no ST elevation.  Patient was tachycardic.    Patient was given Ativan and  aspirin.    Blood pressure is elevated and similar in both arms.    Chest x-ray demonstrated multifocal pneumonia.  Patient was given Rocephin and azithromycin for community-acquired pneumonia.    Labs demonstrate a leukocytosis of 14.6 without bandemia or elevation of immature granulocytes, hyponatremia 133, hypokalemia of 3.1, glucose 118, AST 64, total bilirubin 1.7.  Lactic acid is normal.  Blood cultures are pending.  Troponin was elevated to 0.18.    EKG was repeated when troponin was noted to be elevated.  This was unchanged from the first one.    At this time, I feel the patient may have an elevated troponin secondary to pneumonia or perhaps pericarditis, although the EKG does not show obvious changes consistent with this.  I do not suspect PE at this time given multifocal pneumonia seen on chest x-ray.  Her symptoms are consistent with infection rather than ACS as her pain has been constant since Friday.  I feel troponins can be trended.  She is not a candidate for Cath Lab at this time.    6:46 PM  Discussed with Dr. Joseph who agrees to admit the patient.    9:13 PM  Repeat EKG demonstrates no ST elevation or significant change    The total critical care time on this patient is 40 minutes, resuscitating patient, speaking with admitting physician, and deciphering test results. This 40 minutes is exclusive of separately billable procedures.    FINAL IMPRESSION  1. Multifocal pneumonia    2. Acute chest pain    3. Elevated troponin      Electronically signed by: Elizabet Chavez, 3/3/2019 6:06 PM

## 2019-03-04 NOTE — ED NOTES
[place] Risk Assessment done. pt did not meet criteria, will continue to monitor for changes for pt safety. Bed down, side rails up and call light within reach.

## 2019-03-05 ENCOUNTER — PATIENT OUTREACH (OUTPATIENT)
Dept: HEALTH INFORMATION MANAGEMENT | Facility: OTHER | Age: 49
End: 2019-03-05

## 2019-03-05 VITALS
HEIGHT: 67 IN | HEART RATE: 78 BPM | SYSTOLIC BLOOD PRESSURE: 123 MMHG | WEIGHT: 235.89 LBS | OXYGEN SATURATION: 96 % | DIASTOLIC BLOOD PRESSURE: 83 MMHG | BODY MASS INDEX: 37.02 KG/M2 | RESPIRATION RATE: 18 BRPM | TEMPERATURE: 98.3 F

## 2019-03-05 LAB
ANION GAP SERPL CALC-SCNC: 9 MMOL/L (ref 0–11.9)
BASOPHILS # BLD AUTO: 1 % (ref 0–1.8)
BASOPHILS # BLD: 0.08 K/UL (ref 0–0.12)
BUN SERPL-MCNC: 5 MG/DL (ref 8–22)
CALCIUM SERPL-MCNC: 7.7 MG/DL (ref 8.4–10.2)
CHLORIDE SERPL-SCNC: 105 MMOL/L (ref 96–112)
CO2 SERPL-SCNC: 23 MMOL/L (ref 20–33)
CREAT SERPL-MCNC: 0.7 MG/DL (ref 0.5–1.4)
EOSINOPHIL # BLD AUTO: 0.31 K/UL (ref 0–0.51)
EOSINOPHIL NFR BLD: 3.9 % (ref 0–6.9)
ERYTHROCYTE [DISTWIDTH] IN BLOOD BY AUTOMATED COUNT: 53.9 FL (ref 35.9–50)
GLUCOSE SERPL-MCNC: 95 MG/DL (ref 65–99)
HCT VFR BLD AUTO: 30.7 % (ref 37–47)
HGB BLD-MCNC: 9.4 G/DL (ref 12–16)
IMM GRANULOCYTES # BLD AUTO: 0.03 K/UL (ref 0–0.11)
IMM GRANULOCYTES NFR BLD AUTO: 0.4 % (ref 0–0.9)
LYMPHOCYTES # BLD AUTO: 1.71 K/UL (ref 1–4.8)
LYMPHOCYTES NFR BLD: 21.6 % (ref 22–41)
MCH RBC QN AUTO: 25.9 PG (ref 27–33)
MCHC RBC AUTO-ENTMCNC: 30.6 G/DL (ref 33.6–35)
MCV RBC AUTO: 84.6 FL (ref 81.4–97.8)
MONOCYTES # BLD AUTO: 0.87 K/UL (ref 0–0.85)
MONOCYTES NFR BLD AUTO: 11 % (ref 0–13.4)
NEUTROPHILS # BLD AUTO: 4.92 K/UL (ref 2–7.15)
NEUTROPHILS NFR BLD: 62.1 % (ref 44–72)
NRBC # BLD AUTO: 0 K/UL
NRBC BLD-RTO: 0 /100 WBC
PLATELET # BLD AUTO: 251 K/UL (ref 164–446)
PMV BLD AUTO: 11.4 FL (ref 9–12.9)
POTASSIUM SERPL-SCNC: 3.8 MMOL/L (ref 3.6–5.5)
RBC # BLD AUTO: 3.63 M/UL (ref 4.2–5.4)
SODIUM SERPL-SCNC: 137 MMOL/L (ref 135–145)
WBC # BLD AUTO: 7.9 K/UL (ref 4.8–10.8)

## 2019-03-05 PROCEDURE — 90732 PPSV23 VACC 2 YRS+ SUBQ/IM: CPT | Performed by: HOSPITALIST

## 2019-03-05 PROCEDURE — 700102 HCHG RX REV CODE 250 W/ 637 OVERRIDE(OP): Performed by: INTERNAL MEDICINE

## 2019-03-05 PROCEDURE — 36415 COLL VENOUS BLD VENIPUNCTURE: CPT

## 2019-03-05 PROCEDURE — 80048 BASIC METABOLIC PNL TOTAL CA: CPT

## 2019-03-05 PROCEDURE — 3E0234Z INTRODUCTION OF SERUM, TOXOID AND VACCINE INTO MUSCLE, PERCUTANEOUS APPROACH: ICD-10-PCS | Performed by: HOSPITALIST

## 2019-03-05 PROCEDURE — A9270 NON-COVERED ITEM OR SERVICE: HCPCS | Performed by: HOSPITALIST

## 2019-03-05 PROCEDURE — 700111 HCHG RX REV CODE 636 W/ 250 OVERRIDE (IP): Performed by: HOSPITALIST

## 2019-03-05 PROCEDURE — 85025 COMPLETE CBC W/AUTO DIFF WBC: CPT

## 2019-03-05 PROCEDURE — A9270 NON-COVERED ITEM OR SERVICE: HCPCS | Performed by: INTERNAL MEDICINE

## 2019-03-05 PROCEDURE — 99239 HOSP IP/OBS DSCHRG MGMT >30: CPT | Performed by: HOSPITALIST

## 2019-03-05 PROCEDURE — 700102 HCHG RX REV CODE 250 W/ 637 OVERRIDE(OP): Performed by: HOSPITALIST

## 2019-03-05 PROCEDURE — 90471 IMMUNIZATION ADMIN: CPT

## 2019-03-05 RX ORDER — FLUCONAZOLE 200 MG/1
200 TABLET ORAL DAILY
Qty: 1 TAB | Refills: 0 | Status: SHIPPED | OUTPATIENT
Start: 2019-03-05 | End: 2019-03-06

## 2019-03-05 RX ORDER — AZITHROMYCIN 250 MG/1
250 TABLET, FILM COATED ORAL DAILY
Qty: 4 TAB | Refills: 0 | Status: SHIPPED | OUTPATIENT
Start: 2019-03-05 | End: 2019-03-09

## 2019-03-05 RX ADMIN — OXYCODONE HYDROCHLORIDE 5 MG: 5 TABLET ORAL at 10:46

## 2019-03-05 RX ADMIN — FERROUS SULFATE TAB 325 MG (65 MG ELEMENTAL FE) 325 MG: 325 (65 FE) TAB at 05:30

## 2019-03-05 RX ADMIN — POTASSIUM CHLORIDE 40 MEQ: 1500 TABLET, EXTENDED RELEASE ORAL at 05:31

## 2019-03-05 RX ADMIN — Medication 400 MG: at 05:30

## 2019-03-05 RX ADMIN — PNEUMOCOCCAL VACCINE POLYVALENT 25 MCG
25; 25; 25; 25; 25; 25; 25; 25; 25; 25; 25; 25; 25; 25; 25; 25; 25; 25; 25; 25; 25; 25; 25 INJECTION, SOLUTION INTRAMUSCULAR; SUBCUTANEOUS at 14:51

## 2019-03-05 RX ADMIN — GUAIFENESIN 600 MG: 600 TABLET, EXTENDED RELEASE ORAL at 05:30

## 2019-03-05 RX ADMIN — ASPIRIN 325 MG ORAL TABLET 325 MG: 325 PILL ORAL at 05:30

## 2019-03-05 NOTE — CARE PLAN
Problem: Venous Thromboembolism (VTW)/Deep Vein Thrombosis (DVT) Prevention:  Goal: Patient will participate in Venous Thrombosis (VTE)/Deep Vein Thrombosis (DVT)Prevention Measures  Outcome: PROGRESSING AS EXPECTED   03/04/19 2100   OTHER   Pharmacologic Prophylaxis Used LMWH: Enoxaparin(Lovenox)       Problem: Pain Management  Goal: Pain level will decrease to patient's comfort goal  Outcome: PROGRESSING AS EXPECTED   03/05/19 0231   OTHER   Non Verbal Scale  Calm;Sleeping;Unlabored Breathing   Pt educated to call for pain medication when needed.

## 2019-03-05 NOTE — PROGRESS NOTES
Telemetry Shift Summary    Rhythm SR  HR Range 80s-90s  Ectopy No ectopy  Measurements 0.18/0.08/0.32        Normal Values  Rhythm SR  HR Range    Measurements 0.12-0.20 / 0.06-0.10  / 0.30-0.52

## 2019-03-05 NOTE — DISCHARGE INSTRUCTIONS
Discharge Instructions    Discharged to home by car with relative. Discharged via wheelchair, hospital escort: Yes.  Special equipment needed: Not Applicable    Be sure to schedule a follow-up appointment with your primary care doctor or any specialists as instructed.     Discharge Plan:   Smoking Cessation Offered: Patient Refused  Influenza Vaccine Indication: Patient Refuses    I understand that a diet low in cholesterol, fat, and sodium is recommended for good health. Unless I have been given specific instructions below for another diet, I accept this instruction as my diet prescription.   Other diet: regular    Special Instructions: None    · Is patient discharged on Warfarin / Coumadin?   No     Depression / Suicide Risk    As you are discharged from this [facility] facility, it is important to learn how to keep safe from harming yourself.    Recognize the warning signs:  · Abrupt changes in personality, positive or negative- including increase in energy   · Giving away possessions  · Change in eating patterns- significant weight changes-  positive or negative  · Change in sleeping patterns- unable to sleep or sleeping all the time   · Unwillingness or inability to communicate  · Depression  · Unusual sadness, discouragement and loneliness  · Talk of wanting to die  · Neglect of personal appearance   · Rebelliousness- reckless behavior  · Withdrawal from people/activities they love  · Confusion- inability to concentrate     If you or a loved one observes any of these behaviors or has concerns about self-harm, here's what you can do:  · Talk about it- your feelings and reasons for harming yourself  · Remove any means that you might use to hurt yourself (examples: pills, rope, extension cords, firearm)  · Get professional help from the community (Mental Health, Substance Abuse, psychological counseling)  · Do not be alone:Call your Safe Contact- someone whom you trust who will be there for you.  · Call your  local CRISIS HOTLINE 100-2270 or 658-481-9125  · Call your local Children's Mobile Crisis Response Team [place] (692) 751-7651 or www.[employer]  · Call the toll free National Suicide Prevention Hotlines   · National Suicide Prevention Lifeline 427-503-XFMI (0947)  · National [employer] Line Network 800-SUICIDE (839-3792)

## 2019-03-05 NOTE — PROGRESS NOTES
Patient a+o x 4, denies sob at rest/lightheadedness/numbness/tingling/dizziness/chest pain/n/v/d. Dyspnea with exertion. Pulse ox 95% on room air at rest, dropped to 88% on room air while ambulating (?100ft). Tolerating diet. C/o pain with inspiration - 6/10 - oxycodone amdin a/o with good effect, wctm. Proper demonstration of IS - IS up to 1500ml. Fall precautions/hourly rounding maintained, call light within reach and functioning, all items within reach.  Patient encouraged to call for assistance, poc reviewed with patient, ?'s/concerns answered.     Patient is hoping to be discharged today.

## 2019-03-05 NOTE — DISCHARGE SUMMARY
Discharge Summary    CHIEF COMPLAINT ON ADMISSION  Chief Complaint   Patient presents with   • Chest Pain   • Shortness of Breath       Reason for Admission  Chest Pain; Shortness of Breath     Admission Date  3/3/2019    CODE STATUS  Prior    HPI & HOSPITAL COURSE  This is a 48 y.o. female here with chest pain and a cough for 3 days. She had a previous history of being treated for community acquired pneumonia that is recurrent and related to sarcoidosis in the past. She was admitted, treated with IV antibiotics and improved through her hospital course. She will be discharged home on empiric oral antibiotics. Her cultures remained negative here.          Therefore, she is discharged in good and stable condition to home with close outpatient follow-up.    The patient met 2-midnight criteria for an inpatient stay at the time of discharge.    Discharge Date  3/5/2019    FOLLOW UP ITEMS POST DISCHARGE  none    DISCHARGE DIAGNOSES  Principal Problem:    Community acquired pneumonia of left lung POA: Yes  Active Problems:    Troponin level elevated POA: Unknown    Hypokalemia POA: Yes    Obesity POA: Yes    Sepsis(995.91) POA: Yes    Essential hypertension POA: Yes  Resolved Problems:    HAP (hospital-acquired pneumonia) POA: Unknown      FOLLOW UP  Future Appointments  Date Time Provider Department Center   3/22/2019 9:45 AM Suzanne Burrows M.D. [facility] None     Reilly Calles M.D.  [address] [address]  [phone]            MEDICATIONS ON DISCHARGE     Medication List      START taking these medications      Instructions   azithromycin 250 MG Tabs  Commonly known as:  ZITHROMAX   Take 1 Tab by mouth every day for 4 days.  Dose:  250 mg     fluconazole 200 MG Tabs  Commonly known as:  DIFLUCAN   Take 1 Tab by mouth every day for 1 day.  Dose:  200 mg        CONTINUE taking these medications      Instructions   albuterol 108 (90 Base) MCG/ACT Aers inhalation aerosol   Inhale 2 Puffs by  mouth every four hours as needed for Shortness of Breath.  Dose:  2 Puff     ibuprofen 200 MG Tabs  Commonly known as:  MOTRIN   Take 400 mg by mouth every 6 hours as needed for Mild Pain.  Dose:  400 mg     multivitamin Tabs   Take 1 Tab by mouth every day.  Dose:  1 Tab            Allergies  No Known Allergies    DIET  No orders of the defined types were placed in this encounter.      ACTIVITY  As tolerated.  Weight bearing as tolerated    CONSULTATIONS  none    PROCEDURES  none    LABORATORY  Lab Results   Component Value Date    SODIUM 137 03/05/2019    POTASSIUM 3.8 03/05/2019    CHLORIDE 105 03/05/2019    CO2 23 03/05/2019    GLUCOSE 95 03/05/2019    BUN 5 (L) 03/05/2019    CREATININE 0.70 03/05/2019        Lab Results   Component Value Date    WBC 7.9 03/05/2019    HEMOGLOBIN 9.4 (L) 03/05/2019    HEMATOCRIT 30.7 (L) 03/05/2019    PLATELETCT 251 03/05/2019        Total time of the discharge process exceeds 34 minutes.

## 2019-03-05 NOTE — PROGRESS NOTES
Fatoumataing hospitalist. Pt states she wants a shower and needs an order to take tele off. Awaiting return page from Dr. Joseph.

## 2019-03-06 NOTE — PROGRESS NOTES
Telemetry Shift Summary    Rhythm SR/ST  HR Range 70s-100s  Ectopy None  Measurements .14/.08/.36        Normal Values  Rhythm SR  HR Range    Measurements 0.12-0.20 / 0.06-0.10  / 0.30-0.52

## 2019-03-19 NOTE — DOCUMENTATION QUERY
[facility]                                                                         Query Response Note      PATIENT:               MATILDA FERRARI  ACCT #:                  [id number]  MRN:                       [id number]  :                       1970  ADMIT DATE:       3/3/2019 5:46 PM  DISCH DATE:        3/5/2019 3:19 PM  RESPONDING  PROVIDER #:        [id number]           RESPONSE TEXT:    NSTEMI is ruled out    QUERY TEXT:    Rule Out Condition Clarification [facility]    Pt has documented NSTEMI in history and physical report but not furthur documented in chart. Please clarify status of this condition:    NOTE:  If an appropriate response is not listed below, please respond with a new note.       The patient's Clinical Indicators include:  History and physical states NSTEMI  Query created by: Andriy Em on 3/12/2019 4:12 PM        Electronically signed by:  CARO NOWAK MD 3/19/2019 3:21 PM

## 2019-03-22 ENCOUNTER — OFFICE VISIT (OUTPATIENT)
Dept: INTERNAL MEDICINE | Facility: MEDICAL CENTER | Age: 49
End: 2019-03-22
Payer: COMMERCIAL

## 2019-03-22 ENCOUNTER — TELEPHONE (OUTPATIENT)
Dept: INTERNAL MEDICINE | Facility: MEDICAL CENTER | Age: 49
End: 2019-03-22

## 2019-03-22 VITALS
SYSTOLIC BLOOD PRESSURE: 118 MMHG | HEIGHT: 67 IN | RESPIRATION RATE: 18 BRPM | OXYGEN SATURATION: 93 % | BODY MASS INDEX: 36.41 KG/M2 | WEIGHT: 232 LBS | DIASTOLIC BLOOD PRESSURE: 76 MMHG | HEART RATE: 76 BPM | TEMPERATURE: 98.3 F

## 2019-03-22 DIAGNOSIS — D86.9 SARCOIDOSIS: ICD-10-CM

## 2019-03-22 DIAGNOSIS — B37.31 VAGINAL CANDIDIASIS: ICD-10-CM

## 2019-03-22 DIAGNOSIS — Z87.01 HISTORY OF RECURRENT PNEUMONIA: ICD-10-CM

## 2019-03-22 PROCEDURE — 99213 OFFICE O/P EST LOW 20 MIN: CPT | Mod: GE | Performed by: INTERNAL MEDICINE

## 2019-03-22 RX ORDER — FLUCONAZOLE 150 MG/1
150 TABLET ORAL
Qty: 3 TAB | Refills: 0 | Status: SHIPPED | OUTPATIENT
Start: 2019-03-22 | End: 2021-07-30

## 2019-03-22 ASSESSMENT — PATIENT HEALTH QUESTIONNAIRE - PHQ9: CLINICAL INTERPRETATION OF PHQ2 SCORE: 0

## 2019-03-22 ASSESSMENT — PAIN SCALES - GENERAL: PAINLEVEL: NO PAIN

## 2019-03-22 NOTE — PROGRESS NOTES
Established Patient    Joann presents today with the following:    CC: Post hospital follow    HPI: Joann Deshpande is a 48 y.o. female with past medical history of anemia, sarcoidosis diagnosed at [place] with biopsy. She did not previously treated for sarcoidosis. She has history of recurrent pneumonia, 3 episodes over the last year and a half.  Presented to the clinic post hospital follow-up following recent community-acquired pneumonia for which she was hospitalized on March 3, 2019 for 2 days treated with IV ceftriaxone and azithromycin and was discharged on 4 days azithromycin course.  Patient cough, productive color sputum and fever resolved following antibiotic course.  Reported feeling much better with the current albuterol breathing treatment.  Denies any current shortness of breath or cough.      Patient developed vaginal candidiasis secondary to antibiotic course treated with 1 dose of fluconazole 200 mg at the time of hospital discharge without improvement.  Patient continued to have vaginal itching, cheesy color discharge despite the dose of fluconazole.  Last Pap smear was long time ago more than 5 years.    Review of Systems:     Constitutional: Denies fevers, Denies weight changes  Eyes: Denies changes in vision, no eye pain  Ears/Nose/Throat/Mouth: Denies nasal congestion or sore throat   Cardiovascular: Denies chest pain or palpitations   Respiratory: Denies shortness of breath , Denies cough  Gastrointestinal/Hepatic: Denies abdominal pain, nausea, vomiting, diarrhea or constipation.  Genitourinary: Denies bladder dysfunction, dysuria or frequency  Musculoskeletal/Rheum: Denies  joint pain and swelling   Skin: Denies rash.  Neurological: Denies headache, confusion, memory loss or focal weakness/parasthesias  Psychiatric: denies mood disorder         Patient Active Problem List    Diagnosis Date Noted   • Troponin level elevated 03/03/2019     Priority: High   • Community acquired  "pneumonia of left lung 06/02/2017     Priority: High   • Hypokalemia 06/03/2017     Priority: Medium   • Elevated serum creatinine 02/16/2018   • Obesity (BMI 35.0-39.9 without comorbidity) (HCC) 02/01/2018   • Hypomagnesemia 01/27/2018   • Fatty liver 01/26/2018   • Essential hypertension 01/26/2018   • Acute bronchitis 01/25/2018   • Elevated LFTs 01/25/2018   • Sarcoidosis 06/08/2017   • Obesity 06/03/2017   • Anemia 06/03/2017   • Former smoker 06/03/2017   • Sepsis(995.91) 06/03/2017       Current Outpatient Prescriptions   Medication Sig Dispense Refill   • fluconazole (DIFLUCAN) 150 MG tablet Take 1 Tab by mouth every 72 hours. 3 Tab 0   • albuterol 108 (90 Base) MCG/ACT Aero Soln inhalation aerosol Inhale 2 Puffs by mouth every four hours as needed for Shortness of Breath.     • multivitamin (THERAGRAN) Tab Take 1 Tab by mouth every day.     • ibuprofen (MOTRIN) 200 MG Tab Take 400 mg by mouth every 6 hours as needed for Mild Pain.       No current facility-administered medications for this visit.        /76 (BP Location: Left arm, Patient Position: Sitting, BP Cuff Size: Adult long)   Pulse 76   Temp 36.8 °C (98.3 °F) (Temporal)   Resp 18   Ht 1.702 m (5' 7\")   Wt 105.2 kg (232 lb)   LMP 02/25/2019 (Approximate)   SpO2 93%   BMI 36.34 kg/m²     Physical Exam   Constitutional: Obese comfortable. No acute distress.   Eyes: Pupils are equal, round, and reactive to light. No scleral icterus.  No eye redness  Neck: Neck supple. No thyromegaly present.   Cardiovascular: Normal rate, regular rhythm and normal heart sounds.  Exam reveals no gallop and no friction rub.  No murmur heard.  Pulmonary/Chest: Lungs clear to ascultation bilaterally. Breath sounds normal. No rhonchi, wheezes or crackles.   Musculoskeletal:   No deformity noted. no edema.  No  Lymphadenopathy: no cervical adenopathy  Neurological: alert and oriented to person, place, and time. Cranial nerves 2-12 grossly intact. No obvious " motor or sensory deficits.  Extremities: No edema. No clubbing. No cyanosis. Distal pulses 2+ bilaterally.  Skin: No Rash. No cyanosis. Nails show no clubbing.      Assessment and Plan  1. Vaginal candidiasis  - Failed 1 dose of fluconazole 200 mg received few days ago  -Continue to have cheesy color vaginal discharge, itching.  Treated with  - fluconazole (DIFLUCAN) 150 MG tablet; Take 1 Tab by mouth every 72 hours.  Dispense: 3 Tab; Refill: 0    2. Sarcoidosis  -Diagnosed at [place], biopsy-proven, reported she was told its stage II at the time of diagnosis.  Last chest x-ray review I was unable to identify the bilateral lymphadenopathy likely patient is stage III now.  Patient currently asymptomatic.  -Patient denies any vision changes.    I referred the patient to the pulmonologist for further recommendations.  - REFERRAL TO PULMONOLOGY    3. History of recurrent pneumonia  - History of 3 episodes of pneumonia over the last year and health, hospitalized and treated with antibiotic.  Chest x-ray unremarkable for focal pneumonia to suggest underlying malignant pathology.  No repeat chest x-ray ordered during this visit for that reason.    - I would like to refer the patient to the pulmonologist for further recommendation, ? treatment of sarcoidosis and whether patient would benefit a repeat chest x-ray to confirm resolution of pneumonia.     - REFERRAL TO PULMONOLOGY    4.  Patient will come next office visit for Pap smear by PCP    Signed by: Suzanne Burrows M.D.

## 2019-03-22 NOTE — TELEPHONE ENCOUNTER
1. Caller Name: Joann Deshpande                      Call Back Number: [phone] (home)       2. Message: Patient requesting note for work. I faxed note not a valid number [phone]. I called patient notify note is ready at  for patient .    3. Patient approves office to leave a detailed voicemail/MyChart message: yes

## 2019-03-26 ENCOUNTER — HOSPITAL ENCOUNTER (OUTPATIENT)
Dept: RADIOLOGY | Facility: MEDICAL CENTER | Age: 49
End: 2019-03-26
Attending: STUDENT IN AN ORGANIZED HEALTH CARE EDUCATION/TRAINING PROGRAM
Payer: COMMERCIAL

## 2019-03-26 DIAGNOSIS — Z12.31 SCREENING MAMMOGRAM, ENCOUNTER FOR: ICD-10-CM

## 2019-03-26 PROCEDURE — 77067 SCR MAMMO BI INCL CAD: CPT

## 2019-03-28 ENCOUNTER — HOSPITAL ENCOUNTER (OUTPATIENT)
Dept: RADIOLOGY | Facility: MEDICAL CENTER | Age: 49
End: 2019-03-28

## 2019-04-03 ENCOUNTER — TELEPHONE (OUTPATIENT)
Dept: INTERNAL MEDICINE | Facility: MEDICAL CENTER | Age: 49
End: 2019-04-03

## 2019-04-03 NOTE — TELEPHONE ENCOUNTER
1. Caller Name: [facility]                      Call Back Number: [phone]    2. Message: Patient is schedule for mammo diagnostic unilat with yogi synthesis at [facility] needs co-sign the order. When sign notify Smitha at [facility] scheduling [phone].    3. Patient approves office to leave a detailed voicemail/MyChart message: yes

## 2019-04-04 ENCOUNTER — HOSPITAL ENCOUNTER (OUTPATIENT)
Dept: RADIOLOGY | Facility: MEDICAL CENTER | Age: 49
End: 2019-04-04
Attending: STUDENT IN AN ORGANIZED HEALTH CARE EDUCATION/TRAINING PROGRAM
Payer: COMMERCIAL

## 2019-04-04 ENCOUNTER — TELEPHONE (OUTPATIENT)
Dept: INTERNAL MEDICINE | Facility: MEDICAL CENTER | Age: 49
End: 2019-04-04

## 2019-04-04 DIAGNOSIS — R92.8 ABNORMAL MAMMOGRAM: ICD-10-CM

## 2019-04-04 DIAGNOSIS — N64.59 ABNORMAL BREAST FINDING: ICD-10-CM

## 2019-04-04 PROCEDURE — 76642 ULTRASOUND BREAST LIMITED: CPT | Mod: RT

## 2019-04-04 PROCEDURE — G0279 TOMOSYNTHESIS, MAMMO: HCPCS | Mod: RT

## 2019-04-04 NOTE — TELEPHONE ENCOUNTER
1. Caller Name: Joann Deshpande                      Call Back Number: [phone] (home)     2. Message: Patient requesting appointment to review results and vaccinations.    3. Patient approves office to leave a detailed voicemail/MyChart message: yes

## 2019-04-10 ENCOUNTER — TELEPHONE (OUTPATIENT)
Dept: INTERNAL MEDICINE | Facility: MEDICAL CENTER | Age: 49
End: 2019-04-10

## 2019-04-10 NOTE — TELEPHONE ENCOUNTER
I send patient letter regarding the need to have biopsy due to suspicious finding on ultrasound of breast. Order placed in [facility].

## 2019-04-11 ENCOUNTER — OFFICE VISIT (OUTPATIENT)
Dept: PULMONOLOGY | Facility: HOSPICE | Age: 49
End: 2019-04-11
Payer: COMMERCIAL

## 2019-04-11 VITALS
RESPIRATION RATE: 16 BRPM | HEART RATE: 70 BPM | HEIGHT: 67 IN | SYSTOLIC BLOOD PRESSURE: 126 MMHG | TEMPERATURE: 98.8 F | DIASTOLIC BLOOD PRESSURE: 80 MMHG | WEIGHT: 220 LBS | BODY MASS INDEX: 34.53 KG/M2 | OXYGEN SATURATION: 93 %

## 2019-04-11 DIAGNOSIS — R63.8 INCREASED BMI: ICD-10-CM

## 2019-04-11 DIAGNOSIS — I27.20 PULMONARY HYPERTENSION (HCC): ICD-10-CM

## 2019-04-11 DIAGNOSIS — R06.02 SOB (SHORTNESS OF BREATH): ICD-10-CM

## 2019-04-11 DIAGNOSIS — D86.9 SARCOIDOSIS: ICD-10-CM

## 2019-04-11 PROCEDURE — 99244 OFF/OP CNSLTJ NEW/EST MOD 40: CPT | Performed by: INTERNAL MEDICINE

## 2019-04-11 ASSESSMENT — PAIN SCALES - GENERAL: PAINLEVEL: NO PAIN

## 2019-04-12 ENCOUNTER — HOSPITAL ENCOUNTER (OUTPATIENT)
Dept: RADIOLOGY | Facility: MEDICAL CENTER | Age: 49
End: 2019-04-12
Attending: STUDENT IN AN ORGANIZED HEALTH CARE EDUCATION/TRAINING PROGRAM
Payer: COMMERCIAL

## 2019-04-12 DIAGNOSIS — N64.59 ABNORMAL BREAST FINDING: ICD-10-CM

## 2019-04-14 NOTE — PROGRESS NOTES
Chief Complaint   Patient presents with   • New Patient     Sarcoidosis and recurrent pneumonia       HPI:  The patient is a 48-year-old woman who has a history of sarcoidosis diagnosed in [facility] at the time of a bronchoscopy in 2005.  We do not have records.  The patient is a good historian.  She was a former smoker of a quarter of a pack of cigarettes a day.  She quit smoking in 2017.  She has lived in the local area for several years and feels that her breathing is worse since she arrived here.  She has been diagnosed with pneumonia several times.  She was hospitalized in June 2017, January 2018, in March 2019 with pneumonia.  On each occasion she has had a left upper lobe infiltrate.  On her first hospitalization appears that they also thought she had some degree of pulmonary edema.  On her most recent hospitalization she had a normal BNP.  An echocardiogram on 3/4/2019 showed mild concentric left ventricular hypertrophy with an estimated right ventricular pressure of 45 mmHg.  The patient has never been evaluated for obstructive sleep apnea.  She has never had overnight oximetry performed.  On looking at her films her left upper lobe infiltrate shows no change over the past year and a half.  The patient has never had pulmonary function testing.    Past Medical History:   Diagnosis Date   • Anemia    • Bronchitis    • Chest tightness    • Chickenpox    • Cough    • Difficulty breathing    • Insomnia    • Obesity    • Pneumonia    • Sarcoidosis 2005   • Shortness of breath        ROS:   Constitutional: Denies fevers, chills, night sweats, fatigue or weight loss  Eyes: Denies vision loss, pain, drainage, double vision  Ears, Nose, Throat: Denies earache, tinnitus, hoarseness  Cardiovascular: Denies chest pain, tightness, palpitations at this time  Respiratory: See HPI  Sleep: Denies, snoring, apnea  GI: Denies abdominal pain, nausea, vomiting, diarrhea  : Denies frequent urination, hematuria, painful  "urination  Musculoskeletal: Denies back pain, painful joints, sore muscles  Neurological: Denies headaches, seizures  Skin: Denies rashes, color changes  Psychiatric: Denies depression or thoughts of suicide  Hematologic: Denies bleeding tendency or clotting tendency  Allergic/Immunologic: Denies rhinitis, skin sensitivity    Social History     Social History   • Marital status:      Spouse name: N/A   • Number of children: N/A   • Years of education: N/A     Occupational History   • Not on file.     Social History Main Topics   • Smoking status: Former Smoker     Packs/day: 1.00     Years: 23.00     Quit date: 1/25/2017   • Smokeless tobacco: Never Used   • Alcohol use 1.2 - 1.8 oz/week     2 - 3 Shots of liquor per week      Comment: Occasionally   • Drug use: No   • Sexual activity: Yes     Partners: Male     Birth control/ protection: Surgical     Other Topics Concern   • Not on file     Social History Narrative   • No narrative on file     Patient has no known allergies.  Current Outpatient Prescriptions on File Prior to Visit   Medication Sig Dispense Refill   • multivitamin (THERAGRAN) Tab Take 1 Tab by mouth every day.     • fluconazole (DIFLUCAN) 150 MG tablet Take 1 Tab by mouth every 72 hours. 3 Tab 0   • ibuprofen (MOTRIN) 200 MG Tab Take 400 mg by mouth every 6 hours as needed for Mild Pain.     • albuterol 108 (90 Base) MCG/ACT Aero Soln inhalation aerosol Inhale 2 Puffs by mouth every four hours as needed for Shortness of Breath.       No current facility-administered medications on file prior to visit.      /80   Pulse 70   Temp 37.1 °C (98.8 °F) (Oral)   Resp 16   Ht 1.702 m (5' 7\")   Wt 99.8 kg (220 lb)   SpO2 93%   Family History   Problem Relation Age of Onset   • Hypertension Mother    • Psychiatry Mother    • Stroke Father    • Diabetes Father    • Cancer Maternal Grandmother    • Diabetes Paternal Grandmother    • Lung Disease Paternal Grandfather        Physical Exam:  No " distress at rest on room air  HEENT: PERRLA, EOMI, no scleral icterus, no nasal or oral lesions  Neck: No thyromegaly, no adenopathy, no bruits  Mallampatti: Grade II  Lungs: Equal breath sounds, no wheezes or crackles on today's exam  Heart: Regular rate and rhythm, no gallops or murmurs  Abdomen: Soft, benign, no organomegaly  Extremities: No clubbing, cyanosis, or edema  Neurologic: Cranial nerve, motor, and sensory exam are normal    1. Sarcoidosis    2. Increased BMI    3. Pulmonary hypertension (HCC)    4. SOB (shortness of breath)        This woman has a history of sarcoidosis and most likely has chronic infiltrates secondary to her sarcoidosis.  I doubt that each of her hospitalizations has been due to a new community-acquired pneumonia.  She does have evidence of probable diastolic dysfunction and pulmonary hypertension on her echocardiogram.  It is possible that she has nocturnal hypoxemia.  We will check a chest CT looking for any evidence of interstitial lung disease or thromboembolic disease.  Will also obtain pulmonary function testing and overnight oximetry.  She almost certainly has underlying changes due to her sarcoidosis as evidenced on her chest x-ray.  If she does have significant pulmonary interstitial lung disease she may require initiation of steroid therapy although she does have some weight issues and glucose issues that we have to take into account.  We will see her back in 4-6 weeks for follow-up.

## 2019-05-02 ENCOUNTER — HOSPITAL ENCOUNTER (OUTPATIENT)
Dept: RADIOLOGY | Facility: MEDICAL CENTER | Age: 49
End: 2019-05-02
Attending: INTERNAL MEDICINE
Payer: COMMERCIAL

## 2019-05-02 DIAGNOSIS — I27.20 PULMONARY HYPERTENSION (HCC): ICD-10-CM

## 2019-05-02 DIAGNOSIS — R06.02 SOB (SHORTNESS OF BREATH): ICD-10-CM

## 2019-05-02 DIAGNOSIS — D86.9 SARCOIDOSIS: ICD-10-CM

## 2019-05-02 PROCEDURE — 700117 HCHG RX CONTRAST REV CODE 255: Performed by: INTERNAL MEDICINE

## 2019-05-02 PROCEDURE — 71260 CT THORAX DX C+: CPT

## 2019-05-02 RX ADMIN — IOHEXOL 75 ML: 350 INJECTION, SOLUTION INTRAVENOUS at 15:09

## 2019-05-06 ENCOUNTER — GYNECOLOGY VISIT (OUTPATIENT)
Dept: OBGYN | Facility: CLINIC | Age: 49
End: 2019-05-06
Payer: COMMERCIAL

## 2019-05-06 ENCOUNTER — HOSPITAL ENCOUNTER (OUTPATIENT)
Facility: MEDICAL CENTER | Age: 49
End: 2019-05-06
Attending: OBSTETRICS & GYNECOLOGY
Payer: COMMERCIAL

## 2019-05-06 VITALS
DIASTOLIC BLOOD PRESSURE: 74 MMHG | WEIGHT: 213 LBS | BODY MASS INDEX: 33.43 KG/M2 | SYSTOLIC BLOOD PRESSURE: 124 MMHG | HEIGHT: 67 IN

## 2019-05-06 DIAGNOSIS — Z01.419 WELL WOMAN EXAM: ICD-10-CM

## 2019-05-06 DIAGNOSIS — Z11.51 SCREENING FOR HPV (HUMAN PAPILLOMAVIRUS): ICD-10-CM

## 2019-05-06 DIAGNOSIS — N92.1 MENORRHAGIA WITH IRREGULAR CYCLE: ICD-10-CM

## 2019-05-06 DIAGNOSIS — Z11.51 SPECIAL SCREENING EXAMINATION FOR HUMAN PAPILLOMAVIRUS (HPV): ICD-10-CM

## 2019-05-06 PROCEDURE — 88142 CYTOPATH C/V THIN LAYER: CPT

## 2019-05-06 PROCEDURE — 369999 HCHG MISC LAB CHARGE

## 2019-05-06 PROCEDURE — 99396 PREV VISIT EST AGE 40-64: CPT | Performed by: OBSTETRICS & GYNECOLOGY

## 2019-05-06 NOTE — PROGRESS NOTES
Well woman visit     Joann Binta Leyla Deshpande is a 48 y.o.  who presents to [facility] for her Annual Exam.  Today she has symptoms of heavy and irregular menstrual bleeding. Denies hotflashes, nightsweats.  Had abnormal mammogram in 2019.  Repeat mammogram was normal.  She has a follow-up mammogram ordered for 6 months from now.  She has had a bilateral tubal ligation.  History of 3 C-sections.    GYN History:  LMP:4/15/19  Menarche: unknown  Menses: irregular, bleeds 7-9 days passing large clots the first 2 days  Menopause: no  Last pap: !  Sexually active: yes  History of STDs: no  Fam Hx of breast, ovarian, or colon cancer: maternal grandmother with breast cancer       Health Maintenance:  Last mammogram: 19  Last colorectal screening: not indicated  Immunizations: UTD    Review of Systems:   Pertinent positives documented in HPI and all other systems reviewed & are negative.    All PMH, PSH, allergies, social history and FH reviewed and updated today:  Past Medical History:  Past Medical History:   Diagnosis Date   • Anemia    • Bronchitis    • Chest tightness    • Chickenpox    • Cough    • Difficulty breathing    • Insomnia    • Obesity    • Pneumonia    • Sarcoidosis    • Shortness of breath        Past Surgical History:  Past Surgical History:   Procedure Laterality Date   • BRONCHOSCOPY     • PRIMARY C SECTION         Medications:   Current Outpatient Prescriptions Ordered in [facility]   Medication Sig Dispense Refill   • fluconazole (DIFLUCAN) 150 MG tablet Take 1 Tab by mouth every 72 hours. 3 Tab 0   • ibuprofen (MOTRIN) 200 MG Tab Take 400 mg by mouth every 6 hours as needed for Mild Pain.     • albuterol 108 (90 Base) MCG/ACT Aero Soln inhalation aerosol Inhale 2 Puffs by mouth every four hours as needed for Shortness of Breath.     • multivitamin (THERAGRAN) Tab Take 1 Tab by mouth every day.       No current [facility]-ordered facility-administered medications on file.   "      Allergies: Patient has no known allergies.    Social History:  Social History     Social History   • Marital status:      Spouse name: N/A   • Number of children: N/A   • Years of education: N/A     Social History Main Topics   • Smoking status: Former Smoker     Packs/day: 1.00     Years: 23.00     Quit date: 1/25/2017   • Smokeless tobacco: Never Used   • Alcohol use 1.2 - 1.8 oz/week     2 - 3 Shots of liquor per week      Comment: Occasionally   • Drug use: No   • Sexual activity: Yes     Partners: Male     Birth control/ protection: Surgical     Other Topics Concern   • Not on file     Social History Narrative   • No narrative on file       Family History:  Family History   Problem Relation Age of Onset   • Hypertension Mother    • Psychiatry Mother    • Stroke Father    • Diabetes Father    • Cancer Maternal Grandmother    • Diabetes Paternal Grandmother    • Lung Disease Paternal Grandfather            Objective:   Vitals:  /74 (BP Location: Left arm, Patient Position: Sitting)   Ht 1.702 m (5' 7\")   Wt 96.6 kg (213 lb)   Body mass index is 33.36 kg/m². (Goal BM I>18 <25)    Physical Exam:   Nursing note and vitals reviewed.  Physical Exam   Constitutional: She is oriented to person, place, and time and well-developed, well-nourished, and in no distress.   HENT:   Head: Normocephalic and atraumatic.   Right Ear: External ear normal.   Left Ear: External ear normal.   Eyes: Pupils are equal, round, and reactive to light. Conjunctivae and EOM are normal.   Neck: Normal range of motion. Neck supple. No tracheal deviation present. No thyromegaly present.   Cardiovascular: Normal rate, regular rhythm and normal heart sounds.    Pulmonary/Chest: Effort normal and breath sounds normal. No respiratory distress. She has no wheezes.   Abdominal: Soft. Bowel sounds are normal. She exhibits no distension. There is no tenderness.   Musculoskeletal: Normal range of motion.   Neurological: She is alert " and oriented to person, place, and time. Gait normal.   Skin: Skin is warm and dry.   Psychiatric: Mood, memory, affect and judgment normal.     Genitourinary:  Normal appearing external female genitalia without any rashes, lesions, labial fusion or tenderness.  Vagina is pink moist and well rugated. Physiologic discharge present within vaginal vault.  Cervix well visualized without masses or lesions.  On bimanual exam there is no cervical motion tenderness, uterus is midline and mildly enlarged, not fixed, or tender.  No adnexal masses or tenderness.      Assessment/Plan:     1. Well woman exam  THINPREP PAP WITH HPV   2. Screening for HPV (human papillomavirus)  THINPREP PAP WITH HPV   3. Special screening examination for human papillomavirus (HPV)  THINPREP PAP WITH HPV   4. Menorrhagia with irregular cycle  US-PELVIC COMPLETE (TRANSABDOMINAL/TRANSVAGINAL) (COMBO)    REFERRAL TO OB/GYN       Joann Binta Deshpande is a 48 y.o.  female who presents for her annual gynecologic exam.   # Preventative health care:   --Breast self awareness discussed. Mammogram (annually starting at age 40).  --Cervical cancer screening. Last Pap . Collected today. HPV sent. I will follow up with patient once results are back as per ASCCP guidelines.   --Smoking - nonsmoker.   --Colorectal screening not indicated.   --Immunizations are up to date.  --Diet, exercise, vitamin supplementation, and hydration discussed.  # Contraception: BTL  # STD screening: declines  # Obesity: Diet and exercise discussed as well as finding lifestyle habits that work for patient.  # HMB with irregular cycle- prior auth for EMBx and pelvic ultrasound ordered. Will review ultrasound with patient at time of EMbx. Discussed Mirena as possible plan- gave brochure and patient is open to it. Declines getting TSH or CBC done because she doesn't like needles.   # Follow up for Embx.

## 2019-05-08 DIAGNOSIS — Z11.51 SCREENING FOR HPV (HUMAN PAPILLOMAVIRUS): ICD-10-CM

## 2019-05-08 DIAGNOSIS — Z11.51 SPECIAL SCREENING EXAMINATION FOR HUMAN PAPILLOMAVIRUS (HPV): ICD-10-CM

## 2019-05-08 DIAGNOSIS — Z01.419 WELL WOMAN EXAM: ICD-10-CM

## 2019-05-24 LAB — TEST NAME 95000: NORMAL

## 2019-05-28 ENCOUNTER — HOSPITAL ENCOUNTER (OUTPATIENT)
Dept: LAB | Facility: MEDICAL CENTER | Age: 49
End: 2019-05-28
Attending: INTERNAL MEDICINE
Payer: COMMERCIAL

## 2019-05-28 ENCOUNTER — HOSPITAL ENCOUNTER (OUTPATIENT)
Dept: RADIOLOGY | Facility: MEDICAL CENTER | Age: 49
End: 2019-05-28
Attending: OBSTETRICS & GYNECOLOGY
Payer: COMMERCIAL

## 2019-05-28 DIAGNOSIS — D86.9 SARCOIDOSIS: ICD-10-CM

## 2019-05-28 DIAGNOSIS — N92.1 MENORRHAGIA WITH IRREGULAR CYCLE: ICD-10-CM

## 2019-05-28 PROCEDURE — 80048 BASIC METABOLIC PNL TOTAL CA: CPT

## 2019-05-28 PROCEDURE — 36415 COLL VENOUS BLD VENIPUNCTURE: CPT

## 2019-05-28 PROCEDURE — 76830 TRANSVAGINAL US NON-OB: CPT

## 2019-05-29 LAB
ANION GAP SERPL CALC-SCNC: 11 MMOL/L (ref 0–11.9)
BUN SERPL-MCNC: 11 MG/DL (ref 8–22)
CALCIUM SERPL-MCNC: 10.3 MG/DL (ref 8.5–10.5)
CHLORIDE SERPL-SCNC: 104 MMOL/L (ref 96–112)
CO2 SERPL-SCNC: 24 MMOL/L (ref 20–33)
CREAT SERPL-MCNC: 0.88 MG/DL (ref 0.5–1.4)
FASTING STATUS PATIENT QL REPORTED: NORMAL
GLUCOSE SERPL-MCNC: 92 MG/DL (ref 65–99)
POTASSIUM SERPL-SCNC: 4.1 MMOL/L (ref 3.6–5.5)
SODIUM SERPL-SCNC: 139 MMOL/L (ref 135–145)

## 2019-05-29 NOTE — PROGRESS NOTES
Sent in mychart:    Antonio David,   Your ultrasound is mostly normal however there is a possibility of sweeling of the right fallopian tube, which is likely NORMAL but we will plan on repeating an ultrasound in a few months to assess the stability of the size of the swelling. If it is the same size or smaller- we leave it alone. I will see you for your endometrial biopsy and we can discuss this further at that time. In the meantime, if you develop any fevers/chills and abdominal pain, please let us know or go to the emergency department.   Thank you,   Rita Santos

## 2019-06-05 ENCOUNTER — OFFICE VISIT (OUTPATIENT)
Dept: PULMONOLOGY | Facility: HOSPICE | Age: 49
End: 2019-06-05
Payer: COMMERCIAL

## 2019-06-05 VITALS
TEMPERATURE: 98.9 F | HEIGHT: 67 IN | SYSTOLIC BLOOD PRESSURE: 122 MMHG | BODY MASS INDEX: 32.18 KG/M2 | DIASTOLIC BLOOD PRESSURE: 90 MMHG | WEIGHT: 205 LBS | RESPIRATION RATE: 14 BRPM | HEART RATE: 68 BPM | OXYGEN SATURATION: 97 %

## 2019-06-05 DIAGNOSIS — R63.8 INCREASED BMI: ICD-10-CM

## 2019-06-05 DIAGNOSIS — I27.20 PULMONARY HYPERTENSION (HCC): ICD-10-CM

## 2019-06-05 DIAGNOSIS — J47.9 BRONCHIECTASIS WITHOUT COMPLICATION (HCC): ICD-10-CM

## 2019-06-05 DIAGNOSIS — R06.02 SOB (SHORTNESS OF BREATH): ICD-10-CM

## 2019-06-05 DIAGNOSIS — D86.9 SARCOIDOSIS: ICD-10-CM

## 2019-06-05 PROCEDURE — 99214 OFFICE O/P EST MOD 30 MIN: CPT | Performed by: INTERNAL MEDICINE

## 2019-06-05 ASSESSMENT — PAIN SCALES - GENERAL: PAINLEVEL: NO PAIN

## 2019-06-05 NOTE — PROGRESS NOTES
Chief Complaint   Patient presents with   • Follow-Up     Review OPO and CT results       HPI:  The patient is a 48-year-old woman who has a history of sarcoidosis diagnosed in [facility] at the time of a bronchoscopy in 2005.  We do not have records.  The patient is a good historian.  She was a former smoker of a quarter of a pack of cigarettes a day.  She quit smoking in 2017.  She has lived in the local area for several years and feels that her breathing is worse since she arrived here.  She has been diagnosed with pneumonia several times.  She was hospitalized in June 2017, January 2018, in March 2019 with pneumonia.  On each occasion she has had a left upper lobe infiltrate.  On her first hospitalization appears that they also thought she had some degree of pulmonary edema.  On her most recent hospitalization she had a normal BNP.  An echocardiogram on 3/4/2019 showed mild concentric left ventricular hypertrophy with an estimated right ventricular pressure of 45 mmHg.  The patient has never been evaluated for obstructive sleep apnea.  She has never had overnight oximetry performed.  On looking at her films her left upper lobe infiltrate shows no change over the past year and a half.  The patient has never had pulmonary function testing.    Her CT scan demonstrates  1.  There are bilateral areas of with honeycombing and associated bronchiectasis as well as reticular opacities consistent with end-stage sarcoid parenchymal scarring.  2.  There is no acute airspace process or alveolitis to suggest pneumonia.  3.  Spiculated opacity in the right lower lobe is also probably an area of scarring adjacent bronchiectasis although is somewhat more masslike on the coronal reconstructions.  4.  There is no significant lymphadenopathy.    Pulmonary function testing is still pending.  Overnight oximetry shows a waking average oxygen saturation of 90.2%.  Her overall baseline was about 88%.  However she spent  approximately 4 hours with saturations less than 88%.  She had several episodic desaturations.  She has been able to lose some weight over the past several months.  Her weight has gone from 232 pounds down to 205 pounds.  She feels better and is breathing a bit better.    Past Medical History:   Diagnosis Date   • Anemia    • Bronchitis    • Chest tightness    • Chickenpox    • Cough    • Difficulty breathing    • Insomnia    • Obesity    • Pneumonia    • Sarcoidosis 2005   • Shortness of breath        ROS:   Constitutional: Denies fevers, chills, night sweats, fatigue or weight loss  Eyes: Denies vision loss, pain, drainage, double vision  Ears, Nose, Throat: Denies earache, tinnitus, hoarseness  Cardiovascular: Denies chest pain, tightness, palpitations  Respiratory: See HPI  Sleep: Denies, snoring, apnea  GI: Denies abdominal pain, nausea, vomiting, diarrhea  : Denies frequent urination, hematuria, painful urination  Musculoskeletal: Denies back pain, painful joints, sore muscles  Neurological: Denies headaches, seizures  Skin: Denies rashes, color changes  Psychiatric: Denies depression or thoughts of suicide  Hematologic: Denies bleeding tendency or clotting tendency  Allergic/Immunologic: Denies rhinitis, skin sensitivity    Social History     Social History   • Marital status:      Spouse name: N/A   • Number of children: N/A   • Years of education: N/A     Occupational History   • Not on file.     Social History Main Topics   • Smoking status: Former Smoker     Packs/day: 1.00     Years: 23.00     Quit date: 1/25/2017   • Smokeless tobacco: Never Used   • Alcohol use 1.2 - 1.8 oz/week     2 - 3 Shots of liquor per week      Comment: Occasionally   • Drug use: No   • Sexual activity: Yes     Partners: Male     Birth control/ protection: Surgical     Other Topics Concern   • Not on file     Social History Narrative   • No narrative on file     Patient has no known allergies.  Current Outpatient  "Prescriptions on File Prior to Visit   Medication Sig Dispense Refill   • ibuprofen (MOTRIN) 200 MG Tab Take 400 mg by mouth every 6 hours as needed for Mild Pain.     • albuterol 108 (90 Base) MCG/ACT Aero Soln inhalation aerosol Inhale 2 Puffs by mouth every four hours as needed for Shortness of Breath.     • multivitamin (THERAGRAN) Tab Take 1 Tab by mouth every day.     • fluconazole (DIFLUCAN) 150 MG tablet Take 1 Tab by mouth every 72 hours. 3 Tab 0     No current facility-administered medications on file prior to visit.      /90   Pulse 68   Temp 37.2 °C (98.9 °F) (Oral)   Resp 14   Ht 1.702 m (5' 7\")   Wt 93 kg (205 lb)   SpO2 97%   Family History   Problem Relation Age of Onset   • Hypertension Mother    • Psychiatry Mother    • Stroke Father    • Diabetes Father    • Cancer Maternal Grandmother    • Diabetes Paternal Grandmother    • Lung Disease Paternal Grandfather        Physical Exam:    HEENT: PERRLA, EOMI, no scleral icterus, no nasal or oral lesions  Neck: No thyromegaly, no adenopathy, no bruits  Mallampatti: Grade II  Lungs: Equal breath sounds, no wheezes or crackles  Heart: Regular rate and rhythm, no gallops or murmurs  Abdomen: Soft, benign, no organomegaly  Extremities: No clubbing, cyanosis, or edema  Neurologic: Cranial nerve, motor, and sensory exam are normal    1. Bronchiectasis without complication (HCC)    2. SOB (shortness of breath)    3. Sarcoidosis    4. Increased BMI    5. Pulmonary hypertension (HCC)        Areas of her CT scan appear to demonstrate bronchiectasis.  This may be a typical appearance of sarcoidosis but may indicate true bronchiectasis which would account for her multiple \"pneumonias\".  Since she does have a history of recurrent infection we will check her immunoglobulin levels.  PFTs also need to be done.  We have encouraged her to continue with her weight loss efforts.  We will see her back after PFTs for reassessment.  She may indeed need " polysomnography.

## 2019-06-05 NOTE — LETTER
June 5, 2019    To Whom It May Concern:         This is confirmation that Joann Deshpande attended her scheduled appointment with Yair Ramírez M.D. on 6/05/19.         If you have any questions please do not hesitate to call me at the phone number listed below.    Sincerely,          Angelika Kimball, Med Ass't  [phone]

## 2019-06-12 ENCOUNTER — GYNECOLOGY VISIT (OUTPATIENT)
Dept: OBGYN | Facility: CLINIC | Age: 49
End: 2019-06-12
Payer: COMMERCIAL

## 2019-06-12 VITALS
DIASTOLIC BLOOD PRESSURE: 78 MMHG | HEIGHT: 67 IN | SYSTOLIC BLOOD PRESSURE: 126 MMHG | BODY MASS INDEX: 32.96 KG/M2 | WEIGHT: 210 LBS

## 2019-06-12 DIAGNOSIS — N93.9 ABNORMAL UTERINE BLEEDING (AUB): ICD-10-CM

## 2019-06-12 DIAGNOSIS — N70.11 HYDROSALPINX: ICD-10-CM

## 2019-06-12 DIAGNOSIS — Z32.00 ENCOUNTER FOR PREGNANCY TEST, RESULT UNKNOWN: ICD-10-CM

## 2019-06-12 LAB
INT CON NEG: NEGATIVE
INT CON POS: POSITIVE
POC URINE PREGNANCY TEST: NEGATIVE

## 2019-06-12 PROCEDURE — 58100 BIOPSY OF UTERUS LINING: CPT | Performed by: OBSTETRICS & GYNECOLOGY

## 2019-06-12 PROCEDURE — 81025 URINE PREGNANCY TEST: CPT | Performed by: OBSTETRICS & GYNECOLOGY

## 2019-06-12 RX ORDER — MISOPROSTOL 100 UG/1
TABLET ORAL
Qty: 1 TAB | Refills: 0 | Status: SHIPPED | OUTPATIENT
Start: 2019-06-12 | End: 2021-07-30

## 2019-06-12 NOTE — PROCEDURES
EMB Procedure note     Indications for biopsy: heavy and irregular menstrual bleeding        Patient was counselled regarding the indications for an endometrial biopsy, the small but potential risks of perforation, infection, or inadequate sampling.  All of the patient’s questions were answered and she consented for an endometrial biopsy. Consent was signed.      Pregnancy test  negative      Time out was done to confirm patient, procedure and appropriate equipment was present.       Patient is placed in dorsal lithotomy position and a speculum was placed into the vagina. The cervix was prepped with betadine x3. A tenaculum was placed on the anterior lip of the cervix. A cervical dilator was used to dilate the cervical canal.  The cervical dilator was not able to pass through the cervical os after multiple attempts.       At this time, attempts were discontinued to dilate the cervix.  We discussed options of administering Cytotec and having the patient come back to the office for an attempt for another in office endometrial biopsy versus taking Cytotec and then having a hysteroscopic dilation and curettage in the operating room the following day.  Patient would like to proceed with the hysteroscopic dilation and curettage as she would like the more definitive treatment.  Prior authorization request was submitted and surgery scheduler called to have this procedure done as soon as possible.

## 2019-06-14 ENCOUNTER — HOSPITAL ENCOUNTER (OUTPATIENT)
Facility: MEDICAL CENTER | Age: 49
End: 2019-06-14
Attending: OBSTETRICS & GYNECOLOGY | Admitting: OBSTETRICS & GYNECOLOGY
Payer: COMMERCIAL

## 2020-10-02 ENCOUNTER — IMMUNIZATION (OUTPATIENT)
Dept: SOCIAL WORK | Facility: CLINIC | Age: 50
End: 2020-10-02

## 2020-10-02 DIAGNOSIS — Z23 NEED FOR VACCINATION: ICD-10-CM

## 2020-10-02 PROCEDURE — 90686 IIV4 VACC NO PRSV 0.5 ML IM: CPT | Performed by: REGISTERED NURSE

## 2021-07-14 ENCOUNTER — HOSPITAL ENCOUNTER (EMERGENCY)
Facility: MEDICAL CENTER | Age: 51
End: 2021-07-14
Attending: EMERGENCY MEDICINE

## 2021-07-14 ENCOUNTER — APPOINTMENT (OUTPATIENT)
Dept: RADIOLOGY | Facility: MEDICAL CENTER | Age: 51
End: 2021-07-14
Attending: EMERGENCY MEDICINE

## 2021-07-14 VITALS
HEART RATE: 90 BPM | OXYGEN SATURATION: 93 % | HEIGHT: 67 IN | WEIGHT: 205.91 LBS | BODY MASS INDEX: 32.32 KG/M2 | RESPIRATION RATE: 20 BRPM | TEMPERATURE: 97.7 F | DIASTOLIC BLOOD PRESSURE: 78 MMHG | SYSTOLIC BLOOD PRESSURE: 138 MMHG

## 2021-07-14 DIAGNOSIS — K75.9 HEPATITIS: ICD-10-CM

## 2021-07-14 LAB
ALBUMIN SERPL BCP-MCNC: 4.3 G/DL (ref 3.2–4.9)
ALBUMIN/GLOB SERPL: 1.1 G/DL
ALP SERPL-CCNC: 216 U/L (ref 30–99)
ALT SERPL-CCNC: 76 U/L (ref 2–50)
ANION GAP SERPL CALC-SCNC: 17 MMOL/L (ref 7–16)
APPEARANCE UR: ABNORMAL
AST SERPL-CCNC: 305 U/L (ref 12–45)
BACTERIA #/AREA URNS HPF: ABNORMAL /HPF
BASOPHILS # BLD AUTO: 1.2 % (ref 0–1.8)
BASOPHILS # BLD: 0.09 K/UL (ref 0–0.12)
BILIRUB SERPL-MCNC: 1.7 MG/DL (ref 0.1–1.5)
BILIRUB UR QL STRIP.AUTO: ABNORMAL
BUN SERPL-MCNC: 6 MG/DL (ref 8–22)
CALCIUM SERPL-MCNC: 10.1 MG/DL (ref 8.5–10.5)
CHLORIDE SERPL-SCNC: 96 MMOL/L (ref 96–112)
CO2 SERPL-SCNC: 20 MMOL/L (ref 20–33)
COLOR UR: ABNORMAL
CREAT SERPL-MCNC: 0.78 MG/DL (ref 0.5–1.4)
EKG IMPRESSION: NORMAL
EOSINOPHIL # BLD AUTO: 0.14 K/UL (ref 0–0.51)
EOSINOPHIL NFR BLD: 1.8 % (ref 0–6.9)
EPI CELLS #/AREA URNS HPF: ABNORMAL /HPF
ERYTHROCYTE [DISTWIDTH] IN BLOOD BY AUTOMATED COUNT: 59 FL (ref 35.9–50)
GLOBULIN SER CALC-MCNC: 3.8 G/DL (ref 1.9–3.5)
GLUCOSE SERPL-MCNC: 123 MG/DL (ref 65–99)
GLUCOSE UR STRIP.AUTO-MCNC: NEGATIVE MG/DL
HCT VFR BLD AUTO: 37.5 % (ref 37–47)
HGB BLD-MCNC: 12.1 G/DL (ref 12–16)
HYALINE CASTS #/AREA URNS LPF: ABNORMAL /LPF
IMM GRANULOCYTES # BLD AUTO: 0.03 K/UL (ref 0–0.11)
IMM GRANULOCYTES NFR BLD AUTO: 0.4 % (ref 0–0.9)
KETONES UR STRIP.AUTO-MCNC: ABNORMAL MG/DL
LEUKOCYTE ESTERASE UR QL STRIP.AUTO: ABNORMAL
LYMPHOCYTES # BLD AUTO: 1.89 K/UL (ref 1–4.8)
LYMPHOCYTES NFR BLD: 24.9 % (ref 22–41)
MCH RBC QN AUTO: 25.8 PG (ref 27–33)
MCHC RBC AUTO-ENTMCNC: 32.3 G/DL (ref 33.6–35)
MCV RBC AUTO: 80 FL (ref 81.4–97.8)
MICRO URNS: ABNORMAL
MONOCYTES # BLD AUTO: 0.47 K/UL (ref 0–0.85)
MONOCYTES NFR BLD AUTO: 6.2 % (ref 0–13.4)
NEUTROPHILS # BLD AUTO: 4.96 K/UL (ref 2–7.15)
NEUTROPHILS NFR BLD: 65.5 % (ref 44–72)
NITRITE UR QL STRIP.AUTO: POSITIVE
NRBC # BLD AUTO: 0 K/UL
NRBC BLD-RTO: 0 /100 WBC
PH UR STRIP.AUTO: 5.5 [PH] (ref 5–8)
PLATELET # BLD AUTO: 409 K/UL (ref 164–446)
PMV BLD AUTO: 11.5 FL (ref 9–12.9)
POTASSIUM SERPL-SCNC: 3.2 MMOL/L (ref 3.6–5.5)
PROT SERPL-MCNC: 8.1 G/DL (ref 6–8.2)
PROT UR QL STRIP: 30 MG/DL
RBC # BLD AUTO: 4.69 M/UL (ref 4.2–5.4)
RBC # URNS HPF: ABNORMAL /HPF
RBC UR QL AUTO: NEGATIVE
SODIUM SERPL-SCNC: 133 MMOL/L (ref 135–145)
SP GR UR STRIP.AUTO: 1.02
UROBILINOGEN UR STRIP.AUTO-MCNC: 2 MG/DL
WBC # BLD AUTO: 7.6 K/UL (ref 4.8–10.8)
WBC #/AREA URNS HPF: ABNORMAL /HPF

## 2021-07-14 PROCEDURE — 36415 COLL VENOUS BLD VENIPUNCTURE: CPT

## 2021-07-14 PROCEDURE — 80053 COMPREHEN METABOLIC PANEL: CPT

## 2021-07-14 PROCEDURE — 81001 URINALYSIS AUTO W/SCOPE: CPT

## 2021-07-14 PROCEDURE — 71045 X-RAY EXAM CHEST 1 VIEW: CPT

## 2021-07-14 PROCEDURE — 93005 ELECTROCARDIOGRAM TRACING: CPT | Performed by: EMERGENCY MEDICINE

## 2021-07-14 PROCEDURE — 700111 HCHG RX REV CODE 636 W/ 250 OVERRIDE (IP): Performed by: EMERGENCY MEDICINE

## 2021-07-14 PROCEDURE — 85025 COMPLETE CBC W/AUTO DIFF WBC: CPT

## 2021-07-14 PROCEDURE — 93005 ELECTROCARDIOGRAM TRACING: CPT

## 2021-07-14 PROCEDURE — 99284 EMERGENCY DEPT VISIT MOD MDM: CPT

## 2021-07-14 PROCEDURE — 96374 THER/PROPH/DIAG INJ IV PUSH: CPT

## 2021-07-14 RX ORDER — ONDANSETRON 2 MG/ML
4 INJECTION INTRAMUSCULAR; INTRAVENOUS ONCE
Status: COMPLETED | OUTPATIENT
Start: 2021-07-14 | End: 2021-07-14

## 2021-07-14 RX ADMIN — ONDANSETRON 4 MG: 2 INJECTION INTRAMUSCULAR; INTRAVENOUS at 17:46

## 2021-07-14 ASSESSMENT — PAIN DESCRIPTION - PAIN TYPE: TYPE: ACUTE PAIN

## 2021-07-14 NOTE — ED TRIAGE NOTES
"Chief Complaint   Patient presents with   • Nausea     pt states she lost her taste two weeks ago and that she has been feeling nauseated, pt states she got the [employer] covid vaccine in may and she has not been feeling well since   • Body Aches     pt has been feeling body aches and \"needles in her body\".        Pt walk in for above. Pt speaking full sentences, no signs of distress. Pt is coughing in triage room. Pt denies fevers, but states she has been feeling \"weird ever since she got the covid vaccine\". Educated pt on triage process and to notify if there is any change.      /90   Pulse (!) 104   Temp 36.1 °C (97 °F) (Temporal)   Resp 20   Ht 1.702 m (5' 7\")   Wt 93.4 kg (205 lb 14.6 oz)   LMP 06/15/2021   SpO2 96%   BMI 32.25 kg/m²     "

## 2021-07-20 PROCEDURE — 99285 EMERGENCY DEPT VISIT HI MDM: CPT

## 2021-07-20 ASSESSMENT — FIBROSIS 4 INDEX: FIB4 SCORE: 4.19

## 2021-07-21 ENCOUNTER — APPOINTMENT (OUTPATIENT)
Dept: RADIOLOGY | Facility: MEDICAL CENTER | Age: 51
End: 2021-07-21
Attending: EMERGENCY MEDICINE

## 2021-07-21 ENCOUNTER — APPOINTMENT (OUTPATIENT)
Dept: RADIOLOGY | Facility: MEDICAL CENTER | Age: 51
End: 2021-07-21
Attending: EMERGENCY MEDICINE
Payer: COMMERCIAL

## 2021-07-21 ENCOUNTER — HOSPITAL ENCOUNTER (EMERGENCY)
Facility: MEDICAL CENTER | Age: 51
End: 2021-07-21
Attending: EMERGENCY MEDICINE
Payer: COMMERCIAL

## 2021-07-21 VITALS
TEMPERATURE: 97.8 F | DIASTOLIC BLOOD PRESSURE: 90 MMHG | BODY MASS INDEX: 32.18 KG/M2 | HEIGHT: 67 IN | SYSTOLIC BLOOD PRESSURE: 151 MMHG | RESPIRATION RATE: 16 BRPM | WEIGHT: 205 LBS | HEART RATE: 88 BPM | OXYGEN SATURATION: 98 %

## 2021-07-21 DIAGNOSIS — E87.6 HYPOKALEMIA: ICD-10-CM

## 2021-07-21 DIAGNOSIS — E86.0 DEHYDRATION: ICD-10-CM

## 2021-07-21 DIAGNOSIS — R20.2 PARESTHESIAS: ICD-10-CM

## 2021-07-21 DIAGNOSIS — K76.0 FATTY LIVER: ICD-10-CM

## 2021-07-21 DIAGNOSIS — K86.89 PANCREATIC MASS: ICD-10-CM

## 2021-07-21 DIAGNOSIS — J21.0 RSV (ACUTE BRONCHIOLITIS DUE TO RESPIRATORY SYNCYTIAL VIRUS): ICD-10-CM

## 2021-07-21 DIAGNOSIS — R74.01 TRANSAMINITIS: ICD-10-CM

## 2021-07-21 DIAGNOSIS — K82.8 GALLBLADDER SLUDGE: ICD-10-CM

## 2021-07-21 LAB
ALBUMIN SERPL BCP-MCNC: 3.7 G/DL (ref 3.2–4.9)
ALBUMIN SERPL BCP-MCNC: 4 G/DL (ref 3.2–4.9)
ALBUMIN/GLOB SERPL: 1.1 G/DL
ALBUMIN/GLOB SERPL: 1.3 G/DL
ALP SERPL-CCNC: 170 U/L (ref 30–99)
ALP SERPL-CCNC: 185 U/L (ref 30–99)
ALT SERPL-CCNC: 69 U/L (ref 2–50)
ALT SERPL-CCNC: 78 U/L (ref 2–50)
AMORPH CRY #/AREA URNS HPF: PRESENT /HPF
AMPHET UR QL SCN: NEGATIVE
ANION GAP SERPL CALC-SCNC: 17 MMOL/L (ref 7–16)
ANION GAP SERPL CALC-SCNC: 18 MMOL/L (ref 7–16)
APPEARANCE UR: ABNORMAL
AST SERPL-CCNC: 142 U/L (ref 12–45)
AST SERPL-CCNC: 160 U/L (ref 12–45)
BACTERIA #/AREA URNS HPF: ABNORMAL /HPF
BARBITURATES UR QL SCN: NEGATIVE
BASOPHILS # BLD AUTO: 1 % (ref 0–1.8)
BASOPHILS # BLD: 0.07 K/UL (ref 0–0.12)
BENZODIAZ UR QL SCN: NEGATIVE
BILIRUB SERPL-MCNC: 0.7 MG/DL (ref 0.1–1.5)
BILIRUB SERPL-MCNC: 0.9 MG/DL (ref 0.1–1.5)
BILIRUB UR QL STRIP.AUTO: ABNORMAL
BUN SERPL-MCNC: 5 MG/DL (ref 8–22)
BUN SERPL-MCNC: 5 MG/DL (ref 8–22)
BZE UR QL SCN: NEGATIVE
CALCIUM SERPL-MCNC: 9.1 MG/DL (ref 8.5–10.5)
CALCIUM SERPL-MCNC: 9.1 MG/DL (ref 8.5–10.5)
CANNABINOIDS UR QL SCN: NEGATIVE
CHLORIDE SERPL-SCNC: 100 MMOL/L (ref 96–112)
CHLORIDE SERPL-SCNC: 97 MMOL/L (ref 96–112)
CO2 SERPL-SCNC: 21 MMOL/L (ref 20–33)
CO2 SERPL-SCNC: 22 MMOL/L (ref 20–33)
COLOR UR: ABNORMAL
CREAT SERPL-MCNC: 0.55 MG/DL (ref 0.5–1.4)
CREAT SERPL-MCNC: 0.59 MG/DL (ref 0.5–1.4)
EKG IMPRESSION: NORMAL
EOSINOPHIL # BLD AUTO: 0.16 K/UL (ref 0–0.51)
EOSINOPHIL NFR BLD: 2.2 % (ref 0–6.9)
EPI CELLS #/AREA URNS HPF: ABNORMAL /HPF
ERYTHROCYTE [DISTWIDTH] IN BLOOD BY AUTOMATED COUNT: 57.2 FL (ref 35.9–50)
ETHANOL BLD-MCNC: <10.1 MG/DL (ref 0–10)
FLUAV RNA SPEC QL NAA+PROBE: NEGATIVE
FLUBV RNA SPEC QL NAA+PROBE: NEGATIVE
GLOBULIN SER CALC-MCNC: 2.9 G/DL (ref 1.9–3.5)
GLOBULIN SER CALC-MCNC: 3.5 G/DL (ref 1.9–3.5)
GLUCOSE SERPL-MCNC: 102 MG/DL (ref 65–99)
GLUCOSE SERPL-MCNC: 117 MG/DL (ref 65–99)
GLUCOSE UR STRIP.AUTO-MCNC: NEGATIVE MG/DL
HCT VFR BLD AUTO: 35.8 % (ref 37–47)
HGB BLD-MCNC: 11.7 G/DL (ref 12–16)
IMM GRANULOCYTES # BLD AUTO: 0.04 K/UL (ref 0–0.11)
IMM GRANULOCYTES NFR BLD AUTO: 0.5 % (ref 0–0.9)
KETONES UR STRIP.AUTO-MCNC: ABNORMAL MG/DL
LEUKOCYTE ESTERASE UR QL STRIP.AUTO: ABNORMAL
LYMPHOCYTES # BLD AUTO: 2.26 K/UL (ref 1–4.8)
LYMPHOCYTES NFR BLD: 30.8 % (ref 22–41)
MCH RBC QN AUTO: 26 PG (ref 27–33)
MCHC RBC AUTO-ENTMCNC: 32.7 G/DL (ref 33.6–35)
MCV RBC AUTO: 79.6 FL (ref 81.4–97.8)
METHADONE UR QL SCN: NEGATIVE
MICRO URNS: ABNORMAL
MONOCYTES # BLD AUTO: 0.55 K/UL (ref 0–0.85)
MONOCYTES NFR BLD AUTO: 7.5 % (ref 0–13.4)
NEUTROPHILS # BLD AUTO: 4.26 K/UL (ref 2–7.15)
NEUTROPHILS NFR BLD: 58 % (ref 44–72)
NITRITE UR QL STRIP.AUTO: NEGATIVE
NRBC # BLD AUTO: 0 K/UL
NRBC BLD-RTO: 0 /100 WBC
OPIATES UR QL SCN: NEGATIVE
OXYCODONE UR QL SCN: NEGATIVE
PCP UR QL SCN: NEGATIVE
PH UR STRIP.AUTO: 5.5 [PH] (ref 5–8)
PLATELET # BLD AUTO: 425 K/UL (ref 164–446)
PMV BLD AUTO: 10.8 FL (ref 9–12.9)
POTASSIUM SERPL-SCNC: 2.9 MMOL/L (ref 3.6–5.5)
POTASSIUM SERPL-SCNC: 3.4 MMOL/L (ref 3.6–5.5)
PROPOXYPH UR QL SCN: NEGATIVE
PROT SERPL-MCNC: 6.6 G/DL (ref 6–8.2)
PROT SERPL-MCNC: 7.5 G/DL (ref 6–8.2)
PROT UR QL STRIP: 30 MG/DL
RBC # BLD AUTO: 4.5 M/UL (ref 4.2–5.4)
RBC # URNS HPF: ABNORMAL /HPF
RBC UR QL AUTO: ABNORMAL
RSV RNA SPEC QL NAA+PROBE: POSITIVE
SARS-COV-2 RNA RESP QL NAA+PROBE: NOTDETECTED
SODIUM SERPL-SCNC: 136 MMOL/L (ref 135–145)
SODIUM SERPL-SCNC: 139 MMOL/L (ref 135–145)
SP GR UR STRIP.AUTO: 1.02
SPECIMEN SOURCE: ABNORMAL
TREPONEMA PALLIDUM IGG+IGM AB [PRESENCE] IN SERUM OR PLASMA BY IMMUNOASSAY: NORMAL
UROBILINOGEN UR STRIP.AUTO-MCNC: 1 MG/DL
WBC # BLD AUTO: 7.3 K/UL (ref 4.8–10.8)
WBC #/AREA URNS HPF: ABNORMAL /HPF

## 2021-07-21 PROCEDURE — 81001 URINALYSIS AUTO W/SCOPE: CPT

## 2021-07-21 PROCEDURE — 96375 TX/PRO/DX INJ NEW DRUG ADDON: CPT

## 2021-07-21 PROCEDURE — 82077 ASSAY SPEC XCP UR&BREATH IA: CPT

## 2021-07-21 PROCEDURE — 0241U HCHG SARS-COV-2 COVID-19 NFCT DS RESP RNA 4 TRGT MIC: CPT

## 2021-07-21 PROCEDURE — C9803 HOPD COVID-19 SPEC COLLECT: HCPCS | Performed by: EMERGENCY MEDICINE

## 2021-07-21 PROCEDURE — 96365 THER/PROPH/DIAG IV INF INIT: CPT

## 2021-07-21 PROCEDURE — 80307 DRUG TEST PRSMV CHEM ANLYZR: CPT

## 2021-07-21 PROCEDURE — A9270 NON-COVERED ITEM OR SERVICE: HCPCS | Performed by: EMERGENCY MEDICINE

## 2021-07-21 PROCEDURE — 85025 COMPLETE CBC W/AUTO DIFF WBC: CPT

## 2021-07-21 PROCEDURE — 93005 ELECTROCARDIOGRAM TRACING: CPT | Performed by: EMERGENCY MEDICINE

## 2021-07-21 PROCEDURE — 700105 HCHG RX REV CODE 258: Performed by: EMERGENCY MEDICINE

## 2021-07-21 PROCEDURE — 700111 HCHG RX REV CODE 636 W/ 250 OVERRIDE (IP): Performed by: EMERGENCY MEDICINE

## 2021-07-21 PROCEDURE — 700102 HCHG RX REV CODE 250 W/ 637 OVERRIDE(OP): Performed by: EMERGENCY MEDICINE

## 2021-07-21 PROCEDURE — 80053 COMPREHEN METABOLIC PANEL: CPT | Mod: 91

## 2021-07-21 PROCEDURE — 76705 ECHO EXAM OF ABDOMEN: CPT

## 2021-07-21 PROCEDURE — 86780 TREPONEMA PALLIDUM: CPT

## 2021-07-21 PROCEDURE — 70450 CT HEAD/BRAIN W/O DYE: CPT

## 2021-07-21 RX ORDER — POTASSIUM CHLORIDE 1.5 G/1.58G
20 POWDER, FOR SOLUTION ORAL DAILY
Qty: 7 PACKET | Refills: 0 | Status: SHIPPED | OUTPATIENT
Start: 2021-07-21 | End: 2021-07-28

## 2021-07-21 RX ORDER — SODIUM CHLORIDE, SODIUM LACTATE, POTASSIUM CHLORIDE, CALCIUM CHLORIDE 600; 310; 30; 20 MG/100ML; MG/100ML; MG/100ML; MG/100ML
1000 INJECTION, SOLUTION INTRAVENOUS ONCE
Status: COMPLETED | OUTPATIENT
Start: 2021-07-21 | End: 2021-07-21

## 2021-07-21 RX ORDER — POTASSIUM CHLORIDE 7.45 MG/ML
10 INJECTION INTRAVENOUS ONCE
Status: COMPLETED | OUTPATIENT
Start: 2021-07-21 | End: 2021-07-21

## 2021-07-21 RX ORDER — POTASSIUM CHLORIDE 20 MEQ/1
40 TABLET, EXTENDED RELEASE ORAL ONCE
Status: COMPLETED | OUTPATIENT
Start: 2021-07-21 | End: 2021-07-21

## 2021-07-21 RX ORDER — SULFAMETHOXAZOLE AND TRIMETHOPRIM 800; 160 MG/1; MG/1
1 TABLET ORAL 2 TIMES DAILY
Qty: 14 TABLET | Refills: 0 | Status: SHIPPED | OUTPATIENT
Start: 2021-07-21 | End: 2021-07-28

## 2021-07-21 RX ADMIN — POTASSIUM CHLORIDE 10 MEQ: 10 INJECTION, SOLUTION INTRAVENOUS at 05:56

## 2021-07-21 RX ADMIN — CEFTRIAXONE SODIUM 2 G: 10 INJECTION, POWDER, FOR SOLUTION INTRAVENOUS at 08:46

## 2021-07-21 RX ADMIN — SODIUM CHLORIDE, POTASSIUM CHLORIDE, SODIUM LACTATE AND CALCIUM CHLORIDE 1000 ML: 600; 310; 30; 20 INJECTION, SOLUTION INTRAVENOUS at 05:55

## 2021-07-21 RX ADMIN — POTASSIUM CHLORIDE 40 MEQ: 20 TABLET, EXTENDED RELEASE ORAL at 05:55

## 2021-07-21 ASSESSMENT — PAIN SCALES - WONG BAKER: WONGBAKER_NUMERICALRESPONSE: HURTS JUST A LITTLE BIT

## 2021-07-21 ASSESSMENT — ENCOUNTER SYMPTOMS
ABDOMINAL PAIN: 0
FALLS: 1
DIZZINESS: 0
CHILLS: 0
HEADACHES: 0
FEVER: 0
VOMITING: 1
NAUSEA: 1
COUGH: 0
TINGLING: 1

## 2021-07-21 ASSESSMENT — LIFESTYLE VARIABLES: SUBSTANCE_ABUSE: 1

## 2021-07-21 NOTE — ED NOTES
"Late entry: while on downtime, ua attempt collect, assisted pt to bsc, pt unable to urinate, attempted bed pan, pt unable to urinate. Pt \"scared I will fall\". This rn stayed with pt to assist, still unable and would not attempt to stand. Pt vss, iv started and labs sent. Will attempt to get a UA asap. Pt aware of need. Call light in place, side rails up.   "

## 2021-07-21 NOTE — ED NOTES
Lab called with critical result of RSV + at 0612. Critical lab result read back to .   Dr. Okeefe notified of critical lab result at 0614.  Critical lab result read back by Dr. Okeefe.

## 2021-07-21 NOTE — ED TRIAGE NOTES
"Chief Complaint   Patient presents with   • Numbness     c/o numbness in legs and hands. She said her legs will give out, and she's fallen twice   • Dehydration     said she feels severely dehydrated     Pt said she hasn't felt normal since getting her COVID vaccine. She said she keeps falling. Denies hitting her head.     Pt able to move all extremities. CMS intact in triage. GSC 15.    /95   Pulse (!) 117   Temp 36 °C (96.8 °F) (Oral)   Resp 18   Ht 1.702 m (5' 7\")   Wt 93 kg (205 lb)   SpO2 98%   BMI 32.11 kg/m²     "

## 2021-07-21 NOTE — NON-PROVIDER
CHIEF COMPLAINT  Chief Complaint   Patient presents with   • Numbness     c/o numbness in legs and hands. She said her legs will give out, and she's fallen twice   • Dehydration     said she feels severely dehydrated       HPI  Joann Deshpande is a 50 y.o. female who presents for 3 week history of bilateral lower extremity and bilateral hand numbness and tingling. Patient has had 2 falls in the last week with no LOC or head trauma noted. Patient was seen here 2021 for similar concerns. Patient was initially concerned that COVID vaccine caused her symptoms. Upon further questioning, patient reports that she had falls 1 year ago with similar symptoms. She states that she is drinking at least 2 shots of vodka daily to combat the numbness in her extremities. Patient reports difficulty ambulating and associated exertional SOB and abdominal pain after recent fall.    REVIEW OF SYSTEMS  Negative for fever, rash, chest pain, nausea, vomiting, diarrhea, headache. All other systems are negative.     PAST MEDICAL HISTORY  Past Medical History:   Diagnosis Date   • Anemia    • Bronchitis    • Chest tightness    • Chickenpox    • Cough    • Difficulty breathing    • Insomnia    • Obesity    • Pneumonia    • Sarcoidosis    • Shortness of breath        FAMILY HISTORY  Family History   Problem Relation Age of Onset   • Hypertension Mother    • Psychiatric Illness Mother    • Stroke Father    • Diabetes Father    • Cancer Maternal Grandmother    • Diabetes Paternal Grandmother    • Lung Disease Paternal Grandfather        SOCIAL HISTORY  Social History     Tobacco Use   • Smoking status: Former Smoker     Packs/day: 1.00     Years: 23.00     Pack years: 23.00     Quit date: 2017     Years since quittin.4   • Smokeless tobacco: Never Used   Substance Use Topics   • Alcohol use: Yes     Alcohol/week: 1.2 - 1.8 oz     Types: 2 - 3 Shots of liquor daily     Comment: Occasionally   • Drug use: No  "      SURGICAL HISTORY  Past Surgical History:   Procedure Laterality Date   • BRONCHOSCOPY     • PRIMARY C SECTION         CURRENT MEDICATIONS  I personally reviewed the medication list in the charting documentation.     ALLERGIES  No Known Allergies    MEDICAL RECORD  I have reviewed patient's medical record and pertinent results are listed above.      PHYSICAL EXAM  VITAL SIGNS: /80   Pulse (!) 108   Temp 36 °C (96.8 °F) (Oral)   Resp (!) 29   Ht 1.702 m (5' 7\")   Wt 93 kg (205 lb)   SpO2 96%   BMI 32.11 kg/m²    Constitutional: Well appearing patient in no acute distress.  Awake and alert, not toxic nor ill in appearance.  HENT: Normocephalic, no obvious evidence of acute trauma.  Neck: Comfortable movement without any obvious restriction in the range of motion.  Cardiovascular: Tachycardia with regular rhythm. No m/r/g  Thorax & Lungs: Clear to ausculation bilaterally. Nonlabored respirations. No chest wall tenderness.  No wheezing, rhonchi or rales.    Abdomen: The abdomen is not visibly distended. Mild diffuse tenderness to palpation without guarding or rebound.  No mass appreciated.  Skin: Multiple abrasions to left lower extremity distal to patella.  Extremities/Musculoskeletal: No obvious sign of acute trauma. No asymmetric calf tenderness or edema.   Neurologic: Alert & oriented. CN II-XII intact. 4+ strength BLE. 5+ strength otherwise. Sensation intact. Finger nose finger intact. Gait deferred due to patient preference.  Psychiatric: Normal affect appropriate for the clinical situation.    LABS/EKGs  No results for input(s): WBC, RBC, HEMOGLOBIN, HEMATOCRIT, MCV, MCH, RDW, PLATELETCT, MPV, NEUTSPOLYS, LYMPHOCYTES, MONOCYTES, EOSINOPHILS, BASOPHILS, RBCMORPHOLO in the last 72 hours.    No results for input(s): SODIUM, POTASSIUM, CHLORIDE, CO2, GLUCOSE, BUN, CPKTOTAL in the last 72 hours.    COURSE & MEDICAL DECISION MAKING  I have reviewed any medical record information, laboratory studies " and radiographic results as noted above.  Differential diagnoses includes: alcohol related cerebellar degeneration, Multiple Sclerosis, electrolyte abnormalities    Encounter Summary: This is a pleasant 50 y.o. female who required evaluation in the emergency department today for bilateral lower extremity numbness and tingling with concurrent falls and bilateral hand numbness. On exam, patient has 4+ strength in BLE with sensation intact. 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      DISPOSITION: Patient to be discharged home in stable condition.      FINAL IMPRESSION        Electronically signed by: Genesis Dean, Student, 7/21/2021 2:14 AM

## 2021-07-21 NOTE — DISCHARGE INSTRUCTIONS
There is an irregularity noted on ultrasound today as well as prior CT scan.  It is recommended that you follow-up with MRI on a nonemergent basis.  This can be arranged through your primary care provider.

## 2021-07-21 NOTE — ED NOTES
Pt fell outside when trying to leave, security called for assistance. RNs able to get pt back into wheel. Neuro status unchanged.

## 2021-07-21 NOTE — ED PROVIDER NOTES
ED Provider Note    ED Provider Note    Primary care provider: Reilly Calles M.D. (Inactive)  Means of arrival: POV  History obtained from: patient  History limited by: None    CHIEF COMPLAINT  Chief Complaint   Patient presents with   • Numbness     c/o numbness in legs and hands. She said her legs will give out, and she's fallen twice   • Dehydration     said she feels severely dehydrated       HPI  Joann Deshpande is a 50 y.o. female who presents to the Emergency Department with a chief complaint of 3+ weeks of paresthesias to her bilateral upper and lower extremities.  Patient reports that she has had frequent falls, dating back more than 12 months.  She denies any injuries related to these falls.  She denies lightheadedness or syncope.  She does not report any syncope or lightheadedness.She denies fever or recent cough or cold symptoms.  She has had a few episodes of nausea and vomiting over the last 2 days.  No diarrhea.  She reports a history of sarcoidosis and has been prescribed prednisone in the past but has not been on this medication for more than a year.  She takes no other prescribed medication.  She does admit to multiple shots of vodka daily.  Denies drug use.  Patient states she was here 2 weeks ago with similar symptoms.  She attributed the worsening at that time, to having recently gotten the Covid vaccine.    REVIEW OF SYSTEMS  Review of Systems   Constitutional: Negative for chills and fever.   HENT: Negative for congestion.    Respiratory: Negative for cough.    Cardiovascular: Negative for chest pain.   Gastrointestinal: Positive for nausea and vomiting. Negative for abdominal pain.   Musculoskeletal: Positive for falls.   Neurological: Positive for tingling. Negative for dizziness and headaches.   Psychiatric/Behavioral: Positive for substance abuse.       PAST MEDICAL HISTORY   has a past medical history of Anemia, Bronchitis, Chest tightness, Chickenpox, Cough, Difficulty  "breathing, Insomnia, Obesity, Pneumonia, Sarcoidosis (2005), and Shortness of breath.    SURGICAL HISTORY   has a past surgical history that includes primary c section and bronchoscopy.    SOCIAL HISTORY  Social History     Tobacco Use   • Smoking status: Former Smoker     Packs/day: 1.00     Years: 23.00     Pack years: 23.00     Quit date: 2017     Years since quittin.4   • Smokeless tobacco: Never Used   Substance Use Topics   • Alcohol use: Yes     Alcohol/week: 1.2 - 1.8 oz     Types: 2 - 3 Shots of liquor per week     Comment: Occasionally   • Drug use: No      Social History     Substance and Sexual Activity   Drug Use No       FAMILY HISTORY  Family History   Problem Relation Age of Onset   • Hypertension Mother    • Psychiatric Illness Mother    • Stroke Father    • Diabetes Father    • Cancer Maternal Grandmother    • Diabetes Paternal Grandmother    • Lung Disease Paternal Grandfather        CURRENT MEDICATIONS  Home Medications    **Home medications have not yet been reviewed for this encounter**       ALLERGIES  No Known Allergies    PHYSICAL EXAM  VITAL SIGNS: /75   Pulse 100   Temp 36 °C (96.8 °F) (Oral)   Resp (!) 22   Ht 1.702 m (5' 7\")   Wt 93 kg (205 lb)   SpO2 98%   BMI 32.11 kg/m²   Vitals reviewed.  Constitutional: Patient is oriented to person, place, and time. Appears well-developed and well-nourished. No distress.    Head: Normocephalic and atraumatic.   Ears: Normal external ears bilaterally.   Mouth/Throat: Oropharynx is clear and moist.  Eyes: Conjunctivae are normal. Pupils are equal, round, and reactive to light.   Neck: Normal range of motion. Neck supple.  Cardiovascular: Normal rate, regular rhythm and normal heart sounds. Normal peripheral pulses.  Pulmonary/Chest: Effort normal and breath sounds normal. No respiratory distress, no wheezes, rhonchi, or rales.   Abdominal: Soft. Bowel sounds are normal. There is no tenderness. No rebound or guarding, or " peritoneal signs. No CVA tenderness.  Musculoskeletal: No edema and no tenderness.  Neurological: No focal deficits. CN II through XII intact.  Speech normal.  Muscle strength 5 out of 5 in the bilateral upper and lower extremities.  Sensation is grossly intact.  Skin: Skin is warm and dry. No erythema. No pallor.   Psychiatric: Patient has a normal mood and affect.     LABS  Results for orders placed or performed during the hospital encounter of 07/21/21   CBC WITH DIFFERENTIAL   Result Value Ref Range    WBC 7.3 4.8 - 10.8 K/uL    RBC 4.50 4.20 - 5.40 M/uL    Hemoglobin 11.7 (L) 12.0 - 16.0 g/dL    Hematocrit 35.8 (L) 37.0 - 47.0 %    MCV 79.6 (L) 81.4 - 97.8 fL    MCH 26.0 (L) 27.0 - 33.0 pg    MCHC 32.7 (L) 33.6 - 35.0 g/dL    RDW 57.2 (H) 35.9 - 50.0 fL    Platelet Count 425 164 - 446 K/uL    MPV 10.8 9.0 - 12.9 fL    Neutrophils-Polys 58.00 44.00 - 72.00 %    Lymphocytes 30.80 22.00 - 41.00 %    Monocytes 7.50 0.00 - 13.40 %    Eosinophils 2.20 0.00 - 6.90 %    Basophils 1.00 0.00 - 1.80 %    Immature Granulocytes 0.50 0.00 - 0.90 %    Nucleated RBC 0.00 /100 WBC    Neutrophils (Absolute) 4.26 2.00 - 7.15 K/uL    Lymphs (Absolute) 2.26 1.00 - 4.80 K/uL    Monos (Absolute) 0.55 0.00 - 0.85 K/uL    Eos (Absolute) 0.16 0.00 - 0.51 K/uL    Baso (Absolute) 0.07 0.00 - 0.12 K/uL    Immature Granulocytes (abs) 0.04 0.00 - 0.11 K/uL    NRBC (Absolute) 0.00 K/uL   CMP   Result Value Ref Range    Sodium 136 135 - 145 mmol/L    Potassium 2.9 (L) 3.6 - 5.5 mmol/L    Chloride 97 96 - 112 mmol/L    Co2 21 20 - 33 mmol/L    Anion Gap 18.0 (H) 7.0 - 16.0    Glucose 117 (H) 65 - 99 mg/dL    Bun 5 (L) 8 - 22 mg/dL    Creatinine 0.59 0.50 - 1.40 mg/dL    Calcium 9.1 8.5 - 10.5 mg/dL    AST(SGOT) 160 (H) 12 - 45 U/L    ALT(SGPT) 78 (H) 2 - 50 U/L    Alkaline Phosphatase 185 (H) 30 - 99 U/L    Total Bilirubin 0.9 0.1 - 1.5 mg/dL    Albumin 4.0 3.2 - 4.9 g/dL    Total Protein 7.5 6.0 - 8.2 g/dL    Globulin 3.5 1.9 - 3.5 g/dL    A-G  Ratio 1.1 g/dL   DIAGNOSTIC ALCOHOL   Result Value Ref Range    Diagnostic Alcohol <10.1 0.0 - 10.0 mg/dL   URINE DRUG SCREEN   Result Value Ref Range    Amphetamines Urine Negative Negative    Barbiturates Negative Negative    Benzodiazepines Negative Negative    Cocaine Metabolite Negative Negative    Methadone Negative Negative    Opiates Negative Negative    Oxycodone Negative Negative    Phencyclidine -Pcp Negative Negative    Propoxyphene Negative Negative    Cannabinoid Metab Negative Negative   URINALYSIS    Specimen: Urine   Result Value Ref Range    Color DK Yellow     Character Turbid (A)     Specific Gravity 1.019 <1.035    Ph 5.5 5.0 - 8.0    Glucose Negative Negative mg/dL    Ketones Trace (A) Negative mg/dL    Protein 30 (A) Negative mg/dL    Bilirubin Moderate (A) Negative    Urobilinogen, Urine 1.0 Negative    Nitrite Negative Negative    Leukocyte Esterase Moderate (A) Negative    Occult Blood Large (A) Negative    Micro Urine Req Microscopic    COV-2, FLU A/B, AND RSV BY PCR (2-4 HOURS CEPHEID): Collect NP swab in VTM    Specimen: Respirate   Result Value Ref Range    Influenza virus A RNA Negative Negative    Influenza virus B, PCR Negative Negative    RSV, PCR POSITIVE (A) Negative    SARS-CoV-2 by PCR NotDetected     SARS-CoV-2 Source NP Swab    T.PALLIDUM AB EIA   Result Value Ref Range    Syphilis, Treponemal Qual Non-Reactive Non-Reactive   ESTIMATED GFR   Result Value Ref Range    GFR If African American >60 >60 mL/min/1.73 m 2    GFR If Non African American >60 >60 mL/min/1.73 m 2   CMP   Result Value Ref Range    Sodium 139 135 - 145 mmol/L    Potassium 3.4 (L) 3.6 - 5.5 mmol/L    Chloride 100 96 - 112 mmol/L    Co2 22 20 - 33 mmol/L    Anion Gap 17.0 (H) 7.0 - 16.0    Glucose 102 (H) 65 - 99 mg/dL    Bun 5 (L) 8 - 22 mg/dL    Creatinine 0.55 0.50 - 1.40 mg/dL    Calcium 9.1 8.5 - 10.5 mg/dL    AST(SGOT) 142 (H) 12 - 45 U/L    ALT(SGPT) 69 (H) 2 - 50 U/L    Alkaline Phosphatase 170 (H) 30  - 99 U/L    Total Bilirubin 0.7 0.1 - 1.5 mg/dL    Albumin 3.7 3.2 - 4.9 g/dL    Total Protein 6.6 6.0 - 8.2 g/dL    Globulin 2.9 1.9 - 3.5 g/dL    A-G Ratio 1.3 g/dL   URINE MICROSCOPIC (W/UA)   Result Value Ref Range    WBC Packed (A) /hpf    RBC 10-20 (A) /hpf    Bacteria Many (A) None /hpf    Epithelial Cells Many (A) /hpf    Amorphous Crystal Present /hpf   ESTIMATED GFR   Result Value Ref Range    GFR If African American >60 >60 mL/min/1.73 m 2    GFR If Non African American >60 >60 mL/min/1.73 m 2   EKG   Result Value Ref Range    Report       [facility] Emergency Dept.    Test Date:  2021  Pt Name:    MATILDA FERRARI               Department: ER  MRN:        [id number]                      Room:       [facility]  Gender:     Female                       Technician: [id number]  :        1970                   Requested By:ROGELIO ROMERO  Order #:    [id number]                    Reading MD: NELLIE KASPER DO    Measurements  Intervals                                Axis  Rate:       98                           P:          47  MO:         172                          QRS:        22  QRSD:       96                           T:          -58  QT:         396  QTc:        506    Interpretive Statements  SINUS RHYTHM  LATE PRECORDIAL R/S TRANSITION  BORDERLINE T ABNORMALITIES, DIFFUSE LEADS  BORDERLINE PROLONGED QT INTERVAL  Compared to ECG 2021 14:26:05  Sinus tachycardia no longer present  Ventricular premature complex(es) no longer present  T-wave abnormality still present  Electronically Signed On  7:57:59 PDT by NELLIE KASPER DO         All labs reviewed by me.    EKG Interpretation  Interpreted by me    RADIOLOGY  US-RUQ   Final Result         1.  Hypoechoic structure along the anterior margin of the pancreatic head could represent enlarged lymph node or pancreatic mass, in comparison to prior CT chest May 2, 2019 there is corresponding structure which cannot be further  characterized as a    lymph node or pancreatic mass. Recommend nonemergent follow-up MRI of the pancreas with contrast for further characterization.   2.  Hepatomegaly and echogenic liver, compatible with fatty change versus fibrosis.   3.  Gallbladder sludge      CT-HEAD W/O   Preliminary Result         1.  No acute intracranial abnormality is identified, there are nonspecific white matter changes, commonly associated with small vessel ischemic disease.  Associated mild cerebral atrophy is noted.   2.  Changes of sinusitis.        The radiologist's interpretation of all radiological studies have been reviewed by me.    COURSE & MEDICAL DECISION MAKING  Pertinent Labs & Imaging studies reviewed. (See chart for details)    Obtained and reviewed past medical records.  Patient was seen in the emergency department July 14 evaluated for nausea and vomiting and loss of taste and smell which she attributed to a recent vaccine.  At the time, patient had scattered peripheral opacities in her lungs which was noted to be improved from prior study.  She did have elevated LFTs at the time she was discharged home.    4:53 patient care was signed out from nighttime ERP. Patient seen and examined at bedside, at this time showed atrophy but no acute findings.  Patient is awake and alert on my evaluation.  She understands.  Due to downtime, labs were unavailable and she was unable to be otherwise dispositioned so care was turned over.  CT scan, our plan and that we await labs.     5:45 AM data reviewed.  White blood cell count is normal 7.3.  H&H 11.7 and 35 which is not significantly changed from multiple prior values.  There is no neutrophilic shift. Chemistry shows a low potassium of 2.9.  Anion gap 18.  Glucose elevated 117.  She is noted to have transaminitis which is slightly improved from July 14.  I have ordered replacement potassium both IV as well as oral and IV fluid.  We will plan for repeat chemistry after administration  of these therapies.    6 AM notified by nursing staff, that patient tested positive for RSV.  Covid negative.    9:24 AM patient's reevaluated the bedside.  She is noted to have normal strength on repeat evaluation of her lower extremities.  I discussed with her, her lab results including low potassium and a urinary tract infection.  She also is noted to have elevated liver 4.  Ultrasound shows a fatty liver and gallbladder sludge with a pancreatic mass and/or lymphadenopathy which was previously seen and she is advised to follow-up with MRI.  Await repeat CBC after IV fluid infusion.    11:05 AM patient's reevaluated the bedside.  She reports feeling better.  Vital signs are normal.  Repeat chemistry is improved.  Potassium 3.4, up from 2.9.  LFTs again slightly improved.  I have advised the patient of these results.  I have advised the patient to discontinue alcohol use as she already has elevated LFTs and this only will worsen this.  She will be discharged home with a course of Bactrim as well as short course of potassium.  She informs me now, that she drove herself here.  On my exam, she has not had muscle weakness although she does have some persistent bilateral paresthesias.  At this point, I feel she can safely be discharged home.  She is also advised on follow-up with her PCP for pancreatic mass versus lymph node in this area.  Patient is well-appearing and nontoxic.      She will be discharged home in stable condition.    FINAL IMPRESSION  1. Hypokalemia    2. Paresthesias    3. Fatty liver    4. RSV (acute bronchiolitis due to respiratory syncytial virus)    5. Pancreatic mass    6. Dehydration    7. Transaminitis    8. Gallbladder sludge

## 2021-07-30 ENCOUNTER — APPOINTMENT (OUTPATIENT)
Dept: RADIOLOGY | Facility: MEDICAL CENTER | Age: 51
End: 2021-07-30
Attending: EMERGENCY MEDICINE

## 2021-07-30 ENCOUNTER — HOSPITAL ENCOUNTER (EMERGENCY)
Facility: MEDICAL CENTER | Age: 51
End: 2021-07-31
Attending: EMERGENCY MEDICINE
Payer: COMMERCIAL

## 2021-07-30 DIAGNOSIS — R53.1 GENERALIZED WEAKNESS: ICD-10-CM

## 2021-07-30 DIAGNOSIS — N30.00 ACUTE CYSTITIS WITHOUT HEMATURIA: ICD-10-CM

## 2021-07-30 DIAGNOSIS — R74.01 TRANSAMINITIS: ICD-10-CM

## 2021-07-30 LAB
ALBUMIN SERPL BCP-MCNC: 4.7 G/DL (ref 3.2–4.9)
ALBUMIN/GLOB SERPL: 1.1 G/DL
ALP SERPL-CCNC: 232 U/L (ref 30–99)
ALT SERPL-CCNC: 238 U/L (ref 2–50)
ANION GAP SERPL CALC-SCNC: 18 MMOL/L (ref 7–16)
APPEARANCE UR: ABNORMAL
AST SERPL-CCNC: 352 U/L (ref 12–45)
BACTERIA #/AREA URNS HPF: NEGATIVE /HPF
BASE EXCESS BLDV CALC-SCNC: -6 MMOL/L
BASOPHILS # BLD AUTO: 0.5 % (ref 0–1.8)
BASOPHILS # BLD: 0.05 K/UL (ref 0–0.12)
BILIRUB SERPL-MCNC: 1.2 MG/DL (ref 0.1–1.5)
BILIRUB UR QL STRIP.AUTO: ABNORMAL
BODY TEMPERATURE: ABNORMAL CENTIGRADE
BUN SERPL-MCNC: 14 MG/DL (ref 8–22)
CALCIUM SERPL-MCNC: 10.9 MG/DL (ref 8.5–10.5)
CHLORIDE SERPL-SCNC: 97 MMOL/L (ref 96–112)
CO2 SERPL-SCNC: 16 MMOL/L (ref 20–33)
COLOR UR: ABNORMAL
CREAT SERPL-MCNC: 1.05 MG/DL (ref 0.5–1.4)
EOSINOPHIL # BLD AUTO: 0.05 K/UL (ref 0–0.51)
EOSINOPHIL NFR BLD: 0.5 % (ref 0–6.9)
EPI CELLS #/AREA URNS HPF: ABNORMAL /HPF
ERYTHROCYTE [DISTWIDTH] IN BLOOD BY AUTOMATED COUNT: 60.1 FL (ref 35.9–50)
GLOBULIN SER CALC-MCNC: 4.1 G/DL (ref 1.9–3.5)
GLUCOSE SERPL-MCNC: 158 MG/DL (ref 65–99)
GLUCOSE UR STRIP.AUTO-MCNC: NEGATIVE MG/DL
HCG SERPL QL: NEGATIVE
HCO3 BLDV-SCNC: 19 MMOL/L (ref 24–28)
HCT VFR BLD AUTO: 40.7 % (ref 37–47)
HGB BLD-MCNC: 13.1 G/DL (ref 12–16)
IMM GRANULOCYTES # BLD AUTO: 0.08 K/UL (ref 0–0.11)
IMM GRANULOCYTES NFR BLD AUTO: 0.7 % (ref 0–0.9)
KETONES UR STRIP.AUTO-MCNC: ABNORMAL MG/DL
LACTATE BLD-SCNC: 2.9 MMOL/L (ref 0.5–2)
LACTATE BLD-SCNC: 3.1 MMOL/L (ref 0.5–2)
LEUKOCYTE ESTERASE UR QL STRIP.AUTO: ABNORMAL
LIPASE SERPL-CCNC: 153 U/L (ref 11–82)
LYMPHOCYTES # BLD AUTO: 1.52 K/UL (ref 1–4.8)
LYMPHOCYTES NFR BLD: 14.2 % (ref 22–41)
MAGNESIUM SERPL-MCNC: 1.9 MG/DL (ref 1.5–2.5)
MCH RBC QN AUTO: 26.6 PG (ref 27–33)
MCHC RBC AUTO-ENTMCNC: 32.2 G/DL (ref 33.6–35)
MCV RBC AUTO: 82.7 FL (ref 81.4–97.8)
MICRO URNS: ABNORMAL
MONOCYTES # BLD AUTO: 0.88 K/UL (ref 0–0.85)
MONOCYTES NFR BLD AUTO: 8.2 % (ref 0–13.4)
NEUTROPHILS # BLD AUTO: 8.11 K/UL (ref 2–7.15)
NEUTROPHILS NFR BLD: 75.9 % (ref 44–72)
NITRITE UR QL STRIP.AUTO: POSITIVE
NRBC # BLD AUTO: 0.09 K/UL
NRBC BLD-RTO: 0.8 /100 WBC
PCO2 BLDV: 36.7 MMHG (ref 41–51)
PH BLDV: 7.34 [PH] (ref 7.31–7.45)
PH UR STRIP.AUTO: 5.5 [PH] (ref 5–8)
PLATELET # BLD AUTO: 530 K/UL (ref 164–446)
PMV BLD AUTO: 10.1 FL (ref 9–12.9)
PO2 BLDV: 28.4 MMHG (ref 25–40)
POTASSIUM SERPL-SCNC: 5.3 MMOL/L (ref 3.6–5.5)
PROT SERPL-MCNC: 8.8 G/DL (ref 6–8.2)
PROT UR QL STRIP: 100 MG/DL
RBC # BLD AUTO: 4.92 M/UL (ref 4.2–5.4)
RBC # URNS HPF: ABNORMAL /HPF
RBC UR QL AUTO: NEGATIVE
SAO2 % BLDV: 41.1 %
SODIUM SERPL-SCNC: 131 MMOL/L (ref 135–145)
SP GR UR STRIP.AUTO: 1.02
UROBILINOGEN UR STRIP.AUTO-MCNC: 1 MG/DL
WBC # BLD AUTO: 10.7 K/UL (ref 4.8–10.8)
WBC #/AREA URNS HPF: ABNORMAL /HPF

## 2021-07-30 PROCEDURE — 71045 X-RAY EXAM CHEST 1 VIEW: CPT

## 2021-07-30 PROCEDURE — 83735 ASSAY OF MAGNESIUM: CPT

## 2021-07-30 PROCEDURE — 84703 CHORIONIC GONADOTROPIN ASSAY: CPT

## 2021-07-30 PROCEDURE — U0003 INFECTIOUS AGENT DETECTION BY NUCLEIC ACID (DNA OR RNA); SEVERE ACUTE RESPIRATORY SYNDROME CORONAVIRUS 2 (SARS-COV-2) (CORONAVIRUS DISEASE [COVID-19]), AMPLIFIED PROBE TECHNIQUE, MAKING USE OF HIGH THROUGHPUT TECHNOLOGIES AS DESCRIBED BY CMS-2020-01-R: HCPCS

## 2021-07-30 PROCEDURE — U0005 INFEC AGEN DETEC AMPLI PROBE: HCPCS

## 2021-07-30 PROCEDURE — 83690 ASSAY OF LIPASE: CPT

## 2021-07-30 PROCEDURE — 81001 URINALYSIS AUTO W/SCOPE: CPT

## 2021-07-30 PROCEDURE — 83605 ASSAY OF LACTIC ACID: CPT | Mod: 91

## 2021-07-30 PROCEDURE — 96374 THER/PROPH/DIAG INJ IV PUSH: CPT

## 2021-07-30 PROCEDURE — 700111 HCHG RX REV CODE 636 W/ 250 OVERRIDE (IP): Performed by: EMERGENCY MEDICINE

## 2021-07-30 PROCEDURE — 82803 BLOOD GASES ANY COMBINATION: CPT

## 2021-07-30 PROCEDURE — 80053 COMPREHEN METABOLIC PANEL: CPT

## 2021-07-30 PROCEDURE — 700105 HCHG RX REV CODE 258: Performed by: EMERGENCY MEDICINE

## 2021-07-30 PROCEDURE — 85025 COMPLETE CBC W/AUTO DIFF WBC: CPT

## 2021-07-30 PROCEDURE — 99285 EMERGENCY DEPT VISIT HI MDM: CPT

## 2021-07-30 RX ORDER — SODIUM CHLORIDE, SODIUM LACTATE, POTASSIUM CHLORIDE, AND CALCIUM CHLORIDE .6; .31; .03; .02 G/100ML; G/100ML; G/100ML; G/100ML
30 INJECTION, SOLUTION INTRAVENOUS ONCE
Status: COMPLETED | OUTPATIENT
Start: 2021-07-30 | End: 2021-07-30

## 2021-07-30 RX ORDER — SODIUM CHLORIDE 9 MG/ML
1000 INJECTION, SOLUTION INTRAVENOUS ONCE
Status: COMPLETED | OUTPATIENT
Start: 2021-07-30 | End: 2021-07-31

## 2021-07-30 RX ORDER — FERROUS SULFATE 325(65) MG
325 TABLET ORAL DAILY
COMMUNITY

## 2021-07-30 RX ADMIN — CEFTRIAXONE SODIUM 1 G: 10 INJECTION, POWDER, FOR SOLUTION INTRAVENOUS at 23:55

## 2021-07-30 RX ADMIN — SODIUM CHLORIDE, POTASSIUM CHLORIDE, SODIUM LACTATE AND CALCIUM CHLORIDE 1848 ML: 600; 310; 30; 20 INJECTION, SOLUTION INTRAVENOUS at 22:31

## 2021-07-30 RX ADMIN — SODIUM CHLORIDE 1000 ML: 9 INJECTION, SOLUTION INTRAVENOUS at 23:34

## 2021-07-30 ASSESSMENT — FIBROSIS 4 INDEX: FIB4 SCORE: 1.97

## 2021-07-31 VITALS
RESPIRATION RATE: 18 BRPM | BODY MASS INDEX: 32.18 KG/M2 | SYSTOLIC BLOOD PRESSURE: 155 MMHG | HEART RATE: 100 BPM | HEIGHT: 67 IN | DIASTOLIC BLOOD PRESSURE: 80 MMHG | WEIGHT: 205 LBS | OXYGEN SATURATION: 93 % | TEMPERATURE: 97.4 F

## 2021-07-31 LAB
ANION GAP SERPL CALC-SCNC: 15 MMOL/L (ref 7–16)
BUN SERPL-MCNC: 13 MG/DL (ref 8–22)
CALCIUM SERPL-MCNC: 9.4 MG/DL (ref 8.5–10.5)
CHLORIDE SERPL-SCNC: 98 MMOL/L (ref 96–112)
CO2 SERPL-SCNC: 18 MMOL/L (ref 20–33)
CREAT SERPL-MCNC: 0.8 MG/DL (ref 0.5–1.4)
GLUCOSE SERPL-MCNC: 116 MG/DL (ref 65–99)
LACTATE BLD-SCNC: 2.2 MMOL/L (ref 0.5–2)
POTASSIUM SERPL-SCNC: 4.6 MMOL/L (ref 3.6–5.5)
SODIUM SERPL-SCNC: 131 MMOL/L (ref 135–145)

## 2021-07-31 PROCEDURE — 80048 BASIC METABOLIC PNL TOTAL CA: CPT

## 2021-07-31 PROCEDURE — 83605 ASSAY OF LACTIC ACID: CPT

## 2021-07-31 PROCEDURE — 700117 HCHG RX CONTRAST REV CODE 255: Performed by: EMERGENCY MEDICINE

## 2021-07-31 PROCEDURE — 74177 CT ABD & PELVIS W/CONTRAST: CPT

## 2021-07-31 RX ORDER — CEFDINIR 300 MG/1
300 CAPSULE ORAL 2 TIMES DAILY
Qty: 20 CAPSULE | Refills: 0 | Status: SHIPPED | OUTPATIENT
Start: 2021-07-31 | End: 2021-08-10

## 2021-07-31 RX ADMIN — IOHEXOL 100 ML: 350 INJECTION, SOLUTION INTRAVENOUS at 00:46

## 2021-07-31 NOTE — ED NOTES
Took multiple staff members to lift patient onto bed from w/c  Patient unable to help with transfer

## 2021-07-31 NOTE — ED NOTES
has had symptoms of weakness, inability to care for self for 2 months now  Patient smells of urine, clothes are damp from urine   spouse, who is in the lobby, has been caring for her, when he is home from work.

## 2021-07-31 NOTE — ED PROVIDER NOTES
"ED Provider Note    CHIEF COMPLAINT  Chief Complaint   Patient presents with   • Weakness     Pt here 2 weeks ago for +Covid. Pt also vaccinated for Covid 3 days ago. Pt stated she has been weak and \"foggy brain\" ever since. Pt required multiple staff to help her into a wheelchair        HPI  Patient is a 50-year-old female presents emergency room for chief complaint of prolonged weakness.  Patient states that she has been feeling unwell for long period time and has been seen in the emergency room for similar symptoms of feeling extremely weak and unwell.  Today she says she went to lunch with some friends and began by having an alcoholic beverage, which she knew would make things worse, however she then began feeling unwell with abdominal discomfort and pain.  She then felt overly foggy and profoundly weak.  She has had some dysuria and frequency, denies any abdominal distention or diarrheal illness.    Chart Review shows that the patient has been seen on 7/21 where she was evaluated for 3 weeks of ongoing intermittent paresthesias and bilateral upper and lower extremity weakness.  Frequent falls going back for greater than a year.  At that time she endorsed to frequent alcohol use, lab work done showed anemia, low potassium, elevated anion gap.  He felt substantially improved after fluid resuscitation and correction of her hypokalemia.    CT head at that time was unremarkable, right upper quadrant ultrasound at that time showed small pancreatic structure with recommended nonemergent follow-up with MRI    PPE Note: I personally donned full PPE for all patient encounters during this visit, including being clean-shaven with an N95 respirator mask, gloves, and goggles.     REVIEW OF SYSTEMS  See HPI for further details. All other systems are negative.     PAST MEDICAL HISTORY   has a past medical history of Anemia, Bronchitis, Chest tightness, Chickenpox, Cough, Difficulty breathing, Insomnia, Obesity, Pneumonia, " "Sarcoidosis (2005), and Shortness of breath.    SOCIAL HISTORY  Social History     Tobacco Use   • Smoking status: Former Smoker     Packs/day: 1.00     Years: 23.00     Pack years: 23.00     Quit date: 2017     Years since quittin.5   • Smokeless tobacco: Never Used   Substance and Sexual Activity   • Alcohol use: Yes     Alcohol/week: 1.2 - 1.8 oz     Types: 2 - 3 Shots of liquor per week     Comment: Occasionally   • Drug use: No   • Sexual activity: Yes     Partners: Male     Birth control/protection: Surgical     SURGICAL HISTORY   has a past surgical history that includes primary c section and bronchoscopy.    CURRENT MEDICATIONS  Home Medications     Reviewed by Juno Nogueira, PhT (Pharmacy Tech) on 21 at 2140  Med List Status: Complete   Medication Last Dose Status   albuterol 108 (90 Base) MCG/ACT Aero Soln inhalation aerosol Couple days ago Active   ferrous sulfate 325 (65 Fe) MG tablet Last Week Active   ibuprofen (MOTRIN) 200 MG Tab Last Week Active   multivitamin (THERAGRAN) Tab Last week Active              ALLERGIES  No Known Allergies    PHYSICAL EXAM  VITAL SIGNS: /85   Pulse (!) 106   Temp 36.3 °C (97.4 °F) (Temporal)   Resp 17   Ht 1.702 m (5' 7\")   Wt 93 kg (205 lb)   SpO2 93%   Breastfeeding No   BMI 32.11 kg/m²   Pulse ox interpretation: I interpret this pulse ox as normal.  Genl: F sitting in chair comfortably, speaking clearly, appears tired but in no acute distress   Head: NC/AT   ENT: Mucous membranes dry, posterior pharynx clear, uvula midline, nares patent bilaterally   Eyes: Normal sclera, pupils equal round reactive to light  Neck: Supple, FROM, no LAD appreciated  Pulmonary: Lungs are clear to auscultation bilaterally  Chest: No TTP  CV: tachycardia, no murmur appreciated, pulses 2+ in both upper and lower extremities,  Abdomen: soft, globally tender in the epigastrium, left flank, left lower quadrant, suprapubic region, this is wildly " inconsistent on reexaminations. ND; no rebound/guarding, no masses palpated, no HSM   : no CVA or suprapubic tenderness   Musculoskeletal: Pain free ROM of the neck. Moving upper and lower extremities and spontaneous in coordinated fashion  Neuro: A&Ox4 (person, place, time, situation), speech fluent, gait not assessed, no focal deficits appreciated, No cerebellar signs. Sensation is grossly intact in the distal upper and lower extremities. 5/5 strength in  and dorsiflexion/plantar flexion of the ankles  Psych: Patient has an appropriate affect and behavior  Skin: No rash or lesions.  No pallor or jaundice.  No cyanosis.  Warm and dry.     DIAGNOSTIC STUDIES / PROCEDURES    LABS  Labs Reviewed   CBC WITH DIFFERENTIAL - Abnormal; Notable for the following components:       Result Value    MCH 26.6 (*)     MCHC 32.2 (*)     RDW 60.1 (*)     Platelet Count 530 (*)     Neutrophils-Polys 75.90 (*)     Lymphocytes 14.20 (*)     Neutrophils (Absolute) 8.11 (*)     Monos (Absolute) 0.88 (*)     All other components within normal limits   COMP METABOLIC PANEL - Abnormal; Notable for the following components:    Sodium 131 (*)     Co2 16 (*)     Anion Gap 18.0 (*)     Glucose 158 (*)     Calcium 10.9 (*)     AST(SGOT) 352 (*)     ALT(SGPT) 238 (*)     Alkaline Phosphatase 232 (*)     Total Protein 8.8 (*)     Globulin 4.1 (*)     All other components within normal limits   LACTIC ACID - Abnormal; Notable for the following components:    Lactic Acid 3.1 (*)     All other components within normal limits   LACTIC ACID - Abnormal; Notable for the following components:    Lactic Acid 2.9 (*)     All other components within normal limits   LACTIC ACID - Abnormal; Notable for the following components:    Lactic Acid 2.2 (*)     All other components within normal limits   URINALYSIS - Abnormal; Notable for the following components:    Character Cloudy (*)     Ketones Trace (*)     Protein 100 (*)     Bilirubin Large (*)      Nitrite Positive (*)     Leukocyte Esterase Trace (*)     All other components within normal limits   LIPASE - Abnormal; Notable for the following components:    Lipase 153 (*)     All other components within normal limits   URINE MICROSCOPIC (W/UA) - Abnormal; Notable for the following components:    WBC 5-10 (*)     RBC 5-10 (*)     All other components within normal limits   ESTIMATED GFR - Abnormal; Notable for the following components:    GFR If Non  55 (*)     All other components within normal limits   VENOUS BLOOD GAS - Abnormal; Notable for the following components:    Venous Bg Pco2 36.7 (*)     Venous Bg Hco3 19 (*)     All other components within normal limits   BASIC METABOLIC PANEL - Abnormal; Notable for the following components:    Sodium 131 (*)     Co2 18 (*)     Glucose 116 (*)     All other components within normal limits   MAGNESIUM   HCG QUAL SERUM   SARS-COV-2, PCR (IN-HOUSE)    Narrative:     Have you been in close contact with a person who is suspected  or known to be positive for COVID-19 within the last 30 days  (e.g. last seen that person < 30 days ago)->Unknown   ESTIMATED GFR   BASIC METABOLIC PANEL     RADIOLOGY  CT-ABDOMEN-PELVIS WITH   Final Result      Hepatic steatosis and enlargement      Stable left worse than right basilar pulmonary scarring, bronchiectasis      DX-CHEST-PORTABLE (1 VIEW)   Final Result      Stable chest with areas of upper lung zone scarring. No radiographic evidence of acute cardiopulmonary process.        COURSE & MEDICAL DECISION MAKING  Pertinent Labs & Imaging studies reviewed. (See chart for details)    DDX:  Dehydration, electrolyte derangement, pneumonia, gastritis, esophagitis, Covid, UTI, cystitis, colitis, pancreatitis    MDM    Initial evaluation at 2111  Patient presents emergency room for symptoms described above.  The patient presented with some nonspecific abdominal discomfort after drinking some alcohol and eating food earlier  today.  She says she has not felt well in some capacity area course of several days.  When further questioned, she has had similar episodes lipase in the chart is reviewed where she was seen and evaluated within the last week for similar complaints.  At that time it is noted that she has had some chronic elevations in her liver enzymes, signs of dehydration and is required fluid resuscitation.  She had a negative CT for any acute intracranial abnormalities, had some chronic sludge in her gallbladder    Patient has urinalysis that shows nitrate positive findings concerning for ongoing urinary tract infection.  She is given a dose of Rocephin and will be prescribed cefdinir as an outpatient.  She has an elevated anion gap, glucose is reassuring, bicarb has been decreased but is repeated with normalization of her findings.  She does not appear to be in acute DKA.  She has no leukocytosis, no anemia, negative pregnancy test.  Patient's vital signs are reassuring following fluid resuscitation, she is resting comfortably.    Acutely, the patient does not appear to be septic, the patient is not having any profound weakness and is not demonstrating any debilitation.  She has a urinary tract infection and can be treated with a different antibiotic than she was previously.  She is counseled regarding these findings and she can be discharged home with outpatient follow-up.  Questions are addressed regarding the need for reevaluation should she develop fevers, flank pain or any other interval changes.  She is discharged home in stable condition    HYDRATION: Based on the patient's presentation of Dehydration the patient was given IV fluids. IV Hydration was used because oral hydration was not as rapid as required. Upon recheck following hydration, the patient was improved.    The patient will not drink alcohol nor drive with prescribed medications. The patient will return for worsening symptoms and is stable at the time of  discharge. The patient verbalizes understanding and will comply.    FINAL IMPRESSION  Visit Diagnoses     ICD-10-CM   1. Generalized weakness  R53.1   2. Acute cystitis without hematuria  N30.00   3. Transaminitis  R74.01     Electronically signed by: Hawk Griffin M.D., 7/30/2021 9:11 PM

## 2021-07-31 NOTE — ED NOTES
Patient reaching for her cellphone to call spouse for ride home  Able to use both her hands to reach for phone/purse

## 2021-07-31 NOTE — ED NOTES
Med Rec completed: per patient at bedside  Preferred Pharmacy: [id number]  Allergies:  No Known Allergies    Home Medications:  Medication Sig Comments   • ferrous sulfate 325 (65 Fe) MG tablet Take 325 mg by mouth every day.    • ibuprofen (MOTRIN) 200 MG Tab Take 800 mg by mouth every 6 hours as needed for Mild Pain.    • albuterol 108 (90 Base) MCG/ACT Aero Soln inhalation aerosol Inhale 2 Puffs by mouth every four hours as needed for Shortness of Breath.    • multivitamin (THERAGRAN) Tab Take 1 Tab by mouth every day.      **Patient reports that she finished the antibiotic and potassium packs that was prescribed on 07/21/2021. Unknown date finished. Was prescribed Bactrim DS 1 tab BID for 7 days and Potassium 10 meq 1 everyday for 7 days.

## 2021-07-31 NOTE — ED TRIAGE NOTES
"Chief Complaint   Patient presents with   • Weakness     Pt here 2 weeks ago for +Covid. Pt also vaccinated for Covid 3 days ago. Pt stated she has been weak and \"foggy brain\" ever since. Pt required multiple staff to help her into a wheelchair        Pt wheeled to triage for above complaint.   Pt is alert and oriented, speaking in full sentences, follows commands and responds appropriately to questions. Resp are even and unlabored. No obvious acute distress.    Pt placed in lobby. Pt educated on triage process. Pt encouraged to alert staff for any changes.    Vitals:    07/30/21 1954   BP: 150/107   Pulse: (!) 138   Resp: 20   Temp: 36.3 °C (97.4 °F)   SpO2: 94%       "

## 2021-08-01 LAB
SARS-COV-2 RNA RESP QL NAA+PROBE: NOTDETECTED
SPECIMEN SOURCE: NORMAL